# Patient Record
Sex: MALE | Race: WHITE | Employment: PART TIME | URBAN - METROPOLITAN AREA
[De-identification: names, ages, dates, MRNs, and addresses within clinical notes are randomized per-mention and may not be internally consistent; named-entity substitution may affect disease eponyms.]

---

## 2017-02-27 ENCOUNTER — TELEPHONE (OUTPATIENT)
Dept: FAMILY MEDICINE CLINIC | Age: 62
End: 2017-02-27

## 2017-02-28 ENCOUNTER — OFFICE VISIT (OUTPATIENT)
Dept: FAMILY MEDICINE CLINIC | Age: 62
End: 2017-02-28

## 2017-02-28 VITALS
SYSTOLIC BLOOD PRESSURE: 130 MMHG | HEIGHT: 68 IN | BODY MASS INDEX: 29.73 KG/M2 | DIASTOLIC BLOOD PRESSURE: 80 MMHG | WEIGHT: 196.2 LBS | OXYGEN SATURATION: 98 % | HEART RATE: 80 BPM

## 2017-02-28 DIAGNOSIS — Z23 NEED FOR TDAP VACCINATION: ICD-10-CM

## 2017-02-28 DIAGNOSIS — R10.11 ABDOMINAL PAIN, RUQ: Primary | ICD-10-CM

## 2017-02-28 PROCEDURE — 90471 IMMUNIZATION ADMIN: CPT | Performed by: FAMILY MEDICINE

## 2017-02-28 PROCEDURE — 90715 TDAP VACCINE 7 YRS/> IM: CPT | Performed by: FAMILY MEDICINE

## 2017-02-28 PROCEDURE — 99213 OFFICE O/P EST LOW 20 MIN: CPT | Performed by: FAMILY MEDICINE

## 2017-02-28 ASSESSMENT — ENCOUNTER SYMPTOMS
VOMITING: 0
CONSTIPATION: 0
NAUSEA: 0
BELCHING: 0
FLATUS: 0
ABDOMINAL PAIN: 1

## 2021-06-07 ENCOUNTER — OFFICE VISIT (OUTPATIENT)
Dept: ORTHOPEDIC SURGERY | Age: 66
End: 2021-06-07
Payer: COMMERCIAL

## 2021-06-07 VITALS — WEIGHT: 196 LBS | BODY MASS INDEX: 29.7 KG/M2 | HEIGHT: 68 IN

## 2021-06-07 DIAGNOSIS — M25.562 LEFT KNEE PAIN, UNSPECIFIED CHRONICITY: ICD-10-CM

## 2021-06-07 DIAGNOSIS — M17.12 PATELLOFEMORAL ARTHRITIS OF LEFT KNEE: Primary | ICD-10-CM

## 2021-06-07 PROCEDURE — 99204 OFFICE O/P NEW MOD 45 MIN: CPT | Performed by: ORTHOPAEDIC SURGERY

## 2021-06-07 RX ORDER — COVID-19 ANTIGEN TEST
KIT MISCELLANEOUS 2 TIMES DAILY PRN
COMMUNITY
End: 2021-07-19 | Stop reason: ALTCHOICE

## 2021-06-07 RX ORDER — METHYLPREDNISOLONE ACETATE 40 MG/ML
40 INJECTION, SUSPENSION INTRA-ARTICULAR; INTRALESIONAL; INTRAMUSCULAR; SOFT TISSUE ONCE
Status: DISCONTINUED | OUTPATIENT
Start: 2021-06-07 | End: 2021-06-07

## 2021-06-07 NOTE — PROGRESS NOTES
Chief Complaint  Knee Pain (NP LEFT KNEE PAIN)      History of Present Illness:  Radha Goodman is a pleasant 72 y.o. male for left knee pain and swelling for the past 2 months on a background of discomfort for 2 years. He is the  of her one of my patients Leon Ramos who underwent had wrist surgery recently. He is complaining of predominantly fullness in the left knee suprapatellar area with some discomfort in the back of the knee. He denies any inciting injuries. Is worse with prolonged walking. He has subjective crunching popping cracking sound. His knee knee does not lock or give out anymore it used to. He has tried the occasional Alleve with some mild discomfort along with an over-the-counter knee sleeve. He has had some previous minor superficial knee surgery for cuts. But no intra-articular work. 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 Medical History:  Patient's medications, allergies, past medical, surgical, social and family histories were reviewed and updated as appropriate. Pain Assessment  Location of Pain: Knee  Location Modifiers: Left  Severity of Pain: 4  Quality of Pain: Aching, Throbbing, Popping, Cracking, Grinding (CRUNCHES)  Frequency of Pain: Constant  Aggravating Factors: Straightening, Walking  Limiting Behavior: Some  Relieving Factors: Nsaids  Result of Injury: No  Work-Related Injury: No  ROS: Review of systems reviewed from Patient History Form completed today and available in the patient's chart under the Media tab. Pertinent items are noted in HPI  Review of systems reviewed from Patient History Form completed today and available in the patient's chart under the Media tab. Vital Signs:  Ht 5' 8\" (1.727 m)   Wt 196 lb (88.9 kg)   BMI 29.80 kg/m²         Neuro: Alert & oriented x 3,  normal,  no focal deficits noted. Normal affect.   Eyes: sclera clear  Ears: Normal external ear  Mouth:  No perioral lesions  Pulm: Respirations unlabored and regular  Pulse: Extremities well perfused. 2+ peripheral pulses. Skin: Warm. No ulcerations. Constitutional: The physical examination finds the patient to be well-developed and well-nourished. The patient is alert and oriented x3 and was cooperative throughout the visit. LEFT knee exam    Gait: No use of assistive devices. No antalgic gait. Alignment: normal alignment. Inspection/skin: Skin is intact without erythema or ecchymosis. No gross deformity. Palpation: severe crepitus. no joint line tenderness present. Range of Motion: 0 to 110deg flexion, lacks full flexion    Strength: Normal quadriceps development. Effusion: kamila     Ligamentous stability: No cruciate or collateral ligament instability. Neurologic and vascular: Skin is warm and well-perfused. Sensation is intact to light-touch. Special tests: Negative Tala sign. RIGHT knee exam    Gait: No use of assistive devices. No antalgic gait. Alignment: normal alignment. Inspection/skin: Skin is intact without erythema or ecchymosis. No gross deformity. Palpation: mild crepitus. no joint line tenderness present. Range of Motion: There is full range of motion. Strength: Normal quadriceps development. Effusion: No effusion or swelling present. Ligamentous stability: No cruciate or collateral ligament instability. Neurologic and vascular: Skin is warm and well-perfused. Sensation is intact to light-touch. Special tests: Negative Tala sign. Radiology:       Pertinent imaging reviewed, images only - no report available. Radiographs were obtained and reviewed in the office; 4 views: bilateral PA, bilateral Kristel Po, bilateral Merchants AND left and right lateral    Impression: severe medial compartment narrowing special on the [place] view. Moderate patellofemoral narrowing.   No acute findings no loose bodies         Assessment: Patient is a 72 y.o. male left knee pain and swelling related to varus and patellofemoral osteoarthritis. Impression:  Visit Diagnoses       Codes    Left knee pain, unspecified chronicity    -  Primary M25.562          Office Procedures:  No orders of the defined types were placed in this encounter. Orders Placed This Encounter   Procedures    XR KNEE LEFT (3 VIEWS)     Standing Status:   Future     Number of Occurrences:   1     Standing Expiration Date:   6/7/2022    XR KNEE RIGHT (1-2 VIEWS)     Standing Status:   Future     Number of Occurrences:   1     Standing Expiration Date:   6/7/2022       Plan:  Pertinent imaging was reviewed. The etiology, natural history, and treatment options for the disorder were discussed. The roles of activity medication, antiinflammatories, injections, bracing, physical therapy, and surgical interventions were all described to the patient and questions were answered. Given the patient has his upcoming 2nd dose COVID vaccine, the patient is a not yet a candidate for a left knee aspiration and cortisone injection in the office today. However, he is a candidate for oral diclofenac NSAID therapy, and re-evaluation for a need for injection in 3-4 weeks. He would also be a candidate for an off  brace if he is interested. I also did recommend formal physical therapy for range of motion strengthening conditioning. See him back in 1 month for repeat. All the patient's questions were answered while in the clinic. The patient is understanding of all instructions and agrees with the plan. Approximately 45 minutes was spent on patient education and coordinating care. Follow up in: No follow-ups on file.       Sincerely,    Ye Watt  [address] [facility]   [address], [address] [place] [address], [address]  Email: [email or web address]  Office: [phone]    06/07/21  10:07 AM      The encounter with Boy Holliday was carried out by myself, Dr Ernestine Gonzalez, who personally examined the patient and reviewed the plan. This dictation was performed with a verbal recognition program (DRAGON) and it was checked for errors. It is possible that there are still dictated errors within this office note. If so, please bring any errors to my attention for an addendum. All efforts were made to ensure that this office note is accurate.

## 2021-06-07 NOTE — PROGRESS NOTES
6/7/21  10:08 AM        NDC: 06503-300-60   -   Ropivacaine 0.5 %    LOT: [id number]      NDC: 9878-9963-11   -  Lidocaine 1.0%    LOT: [id number]    COMMENT: LEFT KNEE ASPIRATION AND CORTISONE INJECTION

## 2021-06-07 NOTE — LETTER
PRESCRIPTION FOR PHYSICAL THERAPY      Patient Name: Anastasiia Foster MRN: [id number]  DOS: 6/7/2021     Diagnosis:   1. Patellofemoral arthritis of left knee    2. Left knee pain, unspecified chronicity                                                       Goal:  [x]Decrease Pain and/or Swelling [x]Increase ROM and/or Flexibility     [x]Increase Function   [x]Increase Strength and/or Endurance   []Other     Evaluation:  [x]Evaluation and Treatment []KT-1000 []Isokinetic Exam []Preoperative Eval      Recommended Modalities:  [x]Modalities of Choice      []HCVS            []Electrical Stimulation     [] Remove Dressing  []Ultrasound        []TENS/TNS    [] Lumbar Traction           [] Cervical Traction  []Phonophoresis         []Hot Pack/Cold Pack   []PT Treatment, Unlisted []Other:    Therapeutic Exercises:    [x]Isometrics    [x]Range of Motion []Progressive Exer. [x]Balance Coordination   []Flexibility  []ROM Limited  []Total Hip Replacement   []Passive  []ROM Full   []Total Knee Replacement   []Active Assisted    []Shoulder Impingement Prog  []Active   []Tennis Elbow Program   []Capsular Shift Regular        []Isokinetics                    []Spine Program   [x]Straight Leg Raises  [x] Gait    []Fixation    [] Supine    [] Running    [] Extension    [] Prone   [] Throwing   [] Stabilization   [] AB    [] St Lucian Vedia Sniff    [] AD      [] Spine Eval   [] Cervical Eval  [] Conditioning   [] Lumbar    [] Stationary Bike   [] Lumbar Exer.   [] Stairmaster   [] Functional Cap   [] Devol Track   [] Return to work   [] Treadmill  [x]Other :     [] Aquatic Prog.     Knee conditioning and strengthening program.  Patellofemoral protection program.        Treatment Program:  Frequency: [] 1x  [x] 2x  [] 3x  [] 4x  [] 5x week  Duration: [x] 1  [] 2  [] 3  [] 4  [] 5 month   Weight Bearing: [] Non  [] 1/4  [] 1/2  [] 3/4  [] Full  ROM: [] Restricted  [] Full  [] Follow established:        [] Other:

## 2021-06-28 ENCOUNTER — OFFICE VISIT (OUTPATIENT)
Dept: ORTHOPEDIC SURGERY | Age: 66
End: 2021-06-28
Payer: COMMERCIAL

## 2021-06-28 VITALS — BODY MASS INDEX: 30.61 KG/M2 | WEIGHT: 195 LBS | HEIGHT: 67 IN

## 2021-06-28 DIAGNOSIS — M17.12 PATELLOFEMORAL ARTHRITIS OF LEFT KNEE: Primary | ICD-10-CM

## 2021-06-28 PROCEDURE — 20610 DRAIN/INJ JOINT/BURSA W/O US: CPT | Performed by: ORTHOPAEDIC SURGERY

## 2021-06-28 PROCEDURE — 99214 OFFICE O/P EST MOD 30 MIN: CPT | Performed by: ORTHOPAEDIC SURGERY

## 2021-06-28 RX ORDER — METHYLPREDNISOLONE ACETATE 40 MG/ML
40 INJECTION, SUSPENSION INTRA-ARTICULAR; INTRALESIONAL; INTRAMUSCULAR; SOFT TISSUE ONCE
Status: COMPLETED | OUTPATIENT
Start: 2021-06-28 | End: 2021-06-28

## 2021-06-28 RX ORDER — LIDOCAINE HYDROCHLORIDE 10 MG/ML
10 INJECTION, SOLUTION INFILTRATION; PERINEURAL ONCE
Status: COMPLETED | OUTPATIENT
Start: 2021-06-28 | End: 2021-06-28

## 2021-06-28 RX ADMIN — METHYLPREDNISOLONE ACETATE 40 MG: 40 INJECTION, SUSPENSION INTRA-ARTICULAR; INTRALESIONAL; INTRAMUSCULAR; SOFT TISSUE at 15:52

## 2021-06-28 RX ADMIN — LIDOCAINE HYDROCHLORIDE 10 ML: 10 INJECTION, SOLUTION INFILTRATION; PERINEURAL at 15:52

## 2021-07-06 ENCOUNTER — HOSPITAL ENCOUNTER (OUTPATIENT)
Dept: PHYSICAL THERAPY | Age: 66
Setting detail: THERAPIES SERIES
Discharge: HOME | End: 2021-07-06
Payer: COMMERCIAL

## 2021-07-06 PROCEDURE — 97161 PT EVAL LOW COMPLEX 20 MIN: CPT | Performed by: PHYSICAL THERAPIST

## 2021-07-06 PROCEDURE — 97110 THERAPEUTIC EXERCISES: CPT | Performed by: PHYSICAL THERAPIST

## 2021-07-06 PROCEDURE — 97140 MANUAL THERAPY 1/> REGIONS: CPT | Performed by: PHYSICAL THERAPIST

## 2021-07-06 NOTE — FLOWSHEET NOTE
[facility], [facility]    Physical Therapy Treatment Note/ Progress Report:   Date:  2021    Patient Name:  Mick Delong    :  1955  MRN: [id number]  Restrictions/Precautions:    Medical/Treatment Diagnosis Information:  · Diagnosis: M17.12 - Patellofemoral arthritis of left knee  · Treatment Diagnosis: PT treatment diagnosis: L knee pain  Insurance/Certification information:  PT Insurance Information: UMR, BMN, no auth, no copay  Physician Information:  Referring Practitioner: Dr. Shaunna Hinojosa of care signed (Y/N):     Date of Patient follow up with Physician:     Is this a Progress Report:     []  Yes  [x]  No        If Yes:  Date Range for reporting period:  Beginning  Ending    Progress report will be due (10 Rx or 30 days whichever is less): 88       Recertification will be due (POC Duration  / 90 days whichever is less): 21         Visit # Insurance Allowable Auth Required   1 BMN []  Yes [x]  No        Functional Scale: LEFS: 8%    Date assessed:  21      Latex Allergy:  [x]NO      []YES  Preferred Language for Healthcare:   [x]English       []other    Pain level:  2-7/10     SUBJECTIVE:  See eval    Type: []Constant   []Intermitment  []Radiating []Localized  []other:     Functional Limitations: []Sitting []Standing []Walking    []Squatting []Stairs                []ADL's  []Driving []Sports/Recreation  []Other:      OBJECTIVE: see eval    ROM Current (R) Current (L)                       Strength                           Access Code: [email or web address]  URL: [email or web address]  Date: 2021  Prepared by: Nolvia Hong    Exercises  Supine Bridge - 1 x daily - 7 x weekly - 3 sets - 10 reps - 3 seconds hold  Supine Active Straight Leg Raise - 1 x daily - 7 x weekly - 3 sets - 10 reps  Supine ITB Stretch with Strap - 2 x daily - 7 x weekly - 5 reps - 30 seconds hold  Clamshell - 1 x daily - 7 x weekly - 3 sets - 10 reps  Prone Re-education- Provided training and instruction to the patient for proper LE, core and proximal hip recruitment and positioning and eccentric body weight control with ambulation re-education including up and down stairs     Home Exercise Program:    [x] (05171) Reviewed/Progressed HEP activities related to strengthening, flexibility, endurance, ROM of core, proximal hip and LE for functional self-care, mobility, lifting and ambulation/stair navigation   [] (62179)Reviewed/Progressed HEP activities related to improving balance, coordination, kinesthetic sense, posture, motor skill, proprioception of core, proximal hip and LE for self care, mobility, lifting, and ambulation/stair navigation      Manual Treatments:  PROM / STM / Oscillations-Mobs:  G-I, II, III, IV (PA's, Inf., Post.)  [x] (77634) Provided manual therapy to mobilize LE, proximal hip and/or LS spine soft tissue/joints for the purpose of modulating pain, promoting relaxation,  increasing ROM, reducing/eliminating soft tissue swelling/inflammation/restriction, improving soft tissue extensibility and allowing for proper ROM for normal function with self care, mobility, lifting and ambulation. Modalities:      Charges:  Timed Code Treatment Minutes: 30   Total Treatment Minutes: 50     [x] EVAL (LOW) 28802 (typically 20 minutes face-to-face)  [] EVAL (MOD) 46119 (typically 30 minutes face-to-face)  [] EVAL (HIGH) 32650 (typically 45 minutes face-to-face)  [] RE-EVAL     [x] XK(28102) x 1    [] IONTO  [] NMR (42301) x     [] VASO  [x] Manual (81092) x   1   [] Other:  [] TA x      [] Mech Traction (46816)  [] ES(attended) (92306)      [] ES (un) (32072):     GOALS:   herapist goals for Patient:   Short Term Goals: To be achieved in: 2 weeks  1. Independent in HEP and progression per patient tolerance, in order to prevent re-injury. [] Progressing: [] Met: [] Not Met: [] Adjusted   2.  Patient will have a decrease in pain to facilitate improvement in movement, function, and ADLs as indicated by Functional Deficits. [] Progressing: [] Met: [] Not Met: [] Adjusted    Long Term Goals: To be achieved in: 8 weeks  1. Disability index score of 3% or less for the LEFS to assist with reaching prior level of function. [] Progressing: [] Met: [] Not Met: [] Adjusted  2. Patient will demonstrate increased AROM to 140 knee flexion to allow for proper joint functioning as indicated by patients Functional Deficits. [] Progressing: [] Met: [] Not Met: [] Adjusted  3. Patient will demonstrate an increase in Strength to 5/5 knee extension, hip abduction and hip extension to allow for proper functional mobility as indicated by patients Functional Deficits. [] Progressing: [] Met: [] Not Met: [] Adjusted   4. Patient will return to walking 1/2 a mile without increased symptoms or restriction. [] Progressing: [] Met: [] Not Met: [] Adjusted    Progression Towards Functional goals:  [] Patient is progressing as expected towards functional goals listed. [] Progression is slowed due to complexities listed. [] Progression has been slowed due to co-morbidities. [x] Plan just implemented, too soon to assess goals progression  [] Other:     ASSESSMENT:  See eval    Treatment/Activity Tolerance:  [x] Patient tolerated treatment well [] Patient limited by fatique  [] Patient limited by pain  [] Patient limited by other medical complications  [] Other:     Prognosis: [x] Good [] Fair  [] Poor    Patient Requires Follow-up: [x] Yes  [] No    PLAN: See eval  [] Continue per plan of care [] Alter current plan (see comments)  [x] Plan of care initiated [] Hold pending MD visit [] Discharge      Electronically signed by: Jeana Llamas, GANESH Paula PT, OMT-C      Note: If patient does not return for scheduled/ recommended follow up visits, this note will serve as a discharge from care along with most recent update on progress.

## 2021-07-06 NOTE — PLAN OF CARE
a                                                         The [facility] and [address] [facility] [place]   [address], [address], [address] [facility]  Phone: [phone]   Fax:     [phone]                                                       Physical Therapy Certification    Dear Referring Practitioner: Dr. Lesley Pickens,    We had the pleasure of evaluating the following patient for physical therapy services at [address] [place] [address]. A summary of our findings can be found in the initial assessment below. This includes our plan of care. If you have any questions or concerns regarding these findings, please do not hesitate to contact me at the office phone number checked above. Thank you for the referral.       Physician Signature:_______________________________Date:__________________  By signing above (or electronic signature), therapists plan is approved by physician      Patient: Mary Caraballo   : 1955   MRN: [id number]  Referring Physician: Referring Practitioner: Dr. Lesley Pickens      Evaluation Date: 2021      Medical Diagnosis Information:  Diagnosis: M17.12 - Patellofemoral arthritis of left knee   Treatment Diagnosis: PT treatment diagnosis: L knee pain                                         Insurance information: PT Insurance Information: UMR, BMN, no auth, no copay     Precautions/ Contra-indications: NA  Latex Allergy:  [x]NO      []YES  Preferred Language for Healthcare:   [x]English       []other:    C-SSRS Triggered by Intake questionnaire (Past 2 wk assessment):   [x] No, Questionnaire did not trigger screening.   [] Yes, Patient intake triggered further evaluation      [] C-SSRS Screening completed  [] PCP notified via Plan of Care  [] Emergency services notified    SUBJECTIVE: Patient stated complaint: Patient has had complaints of L knee pain over the last 2 years that has gradually worsened within the last 2 months.  He reports no incident that began his current exacerbation, but states that he has difficulty with increased L knee flexion and sensations of hyperextension when ambulating. He reports having x-rays that showed degenerative changes. Patient had a cortisone injection and an aspiration- reports having relief with the aspiration, noting increased knee flexion motion. Patient to follow up with the MD on 7/19/21. Relevant Medical History: NA  Functional Disability Index: LEFS: 8%    .     Pain Scale: 2-7/10  Easing factors: Rest, Aleeve  Provocative factors: Increased walking, initial sit to stand transfer/steps after prolonged sitting    Type: []Constant   [x]Intermittent  []Radiating []Localized []other:     Numbness/Tingling: denies    Functional Limitations/Impairments: []Sitting []Standing [x]Walking    []Squatting/bending  []Stairs           []ADL's  []Transfers []Sports/Recreation []Other:    Occupation/School: [employer]     Living Status/Prior Level of Function: Independent with ADLs and IADLs,      OBJECTIVE:     ROM LEFT RIGHT   HIP Flex     HIP Abd     HIP Ext     HIP IR     HIP ER     Knee ext +3 hyper +3 hyper   Knee Flex 131 137   Ankle PF     Ankle DF 5/5 5/5   Ankle In     Ankle Ev     Strength  LEFT RIGHT   HIP Flexors 4/5 4+/5   HIP Abductors 4/5 4+/5   HIP Ext 4/5 4+/5   Hip ER     Knee EXT (quad) 4+/5 5/5   Knee Flex (HS) 4+/5 5/5   Ankle DF     Ankle PF     Ankle Inv     Ankle EV          Circumference  Mid  7 cm   40.5 40   LE Dermatomes     LE myotomes       Squat: increased trunk flexion, quad dominant  Single Leg Stance: decreased duration on L >R ~3 seconds   Joint mobility: knee, hip   [x]Normal    []Hypo   []Hyper    Palpation: patella, patellar tendon, quad tendon, ITB, gastroc    Functional Mobility/Transfers: pain free crepitus noted with squatting    Posture: pelvis elevation on L,     Bandages/Dressings/Incisions: NA    Gait: (include devices/WB status): increased R lateral trunk flexion, decreased stance on L LE    Orthopedic Special Tests:                        [x] Patient history, allergies, meds reviewed. Medical chart reviewed. See intake form. Review Of Systems (ROS):  [x]Performed Review of systems (Integumentary, CardioPulmonary, Neurological) by intake and observation. Intake form has been scanned into medical record. Patient has been instructed to contact their primary care physician regarding ROS issues if not already being addressed at this time. Co-morbidities/Complexities (which will affect course of rehabilitation):   []None           Arthritic conditions   []Rheumatoid arthritis (M05.9)  []Osteoarthritis (M19.91)   Cardiovascular conditions   []Hypertension (I10)  []Hyperlipidemia (E78.5)  []Angina pectoris (I20)  []Atherosclerosis (I70)   Musculoskeletal conditions   []Disc pathology   []Congenital spine pathologies   []Prior surgical intervention  []Osteoporosis (M81.8)  []Osteopenia (M85.8)   Endocrine conditions   []Hypothyroid (E03.9)  []Hyperthyroid Gastrointestinal conditions   []Constipation (L16.93)   Metabolic conditions   []Morbid obesity (E66.01)  []Diabetes type 1(E10.65) or 2 (E11.65)   []Neuropathy (G60.9)     Pulmonary conditions   []Asthma (J45)  []Coughing   []COPD (J44.9)   Psychological Disorders  []Anxiety (F41.9)  []Depression (F32.9)   []Other:   []Other:          Barriers to/and or personal factors that will affect rehab potential:              [x]Age  []Sex              []Motivation/Lack of Motivation                        []Co-Morbidities              []Cognitive Function, education/learning barriers              []Environmental, home barriers              []profession/work barriers  []past PT/medical experience  []other:  Justification:     PACEMAKER:  - Denied having a pacemaker that would contraindicate the use of electrical modalities.     METAL IMPLANTS:  - Denied metal implants that would contraindicate the use of thermal modalities. Mikael Galindo CANCER HISTORY:  - Denied a history of cancer that would contraindicate thermal modalities. Falls Risk Assessment (30 days):   [x] Falls Risk assessed and no intervention required. [] Falls Risk assessed and Patient requires intervention due to being higher risk   TUG score (>12s at risk):     [] Falls education provided, including           ASSESSMENT:  Functional Impairments:     []Noted lumbar/proximal hip/LE joint hypomobility   [x]Decreased LE functional ROM   [x]Decreased core/proximal hip strength and neuromuscular control   []Decreased LE functional strength   [x]Reduced balance/proprioceptive control   []other:      Functional Activity Limitations (from functional questionnaire and intake)   [x]Reduced ability to tolerate prolonged functional positions   [x]Reduced ability or difficulty with changes of positions or transfers between positions   []Reduced ability to maintain good posture and demonstrate good body mechanics with sitting, bending, and lifting   []Reduced ability to sleep   [] Reduced ability or tolerance with driving and/or computer work   []Reduced ability to perform lifting, carrying tasks   [x]Reduced ability to squat   []Reduced ability to forward bend   [x]Reduced ability to ambulate prolonged functional periods/distances/surfaces   []Reduced ability to ascend/descend stairs   []Reduced ability to run, hop, cut or jump   []other:    Participation Restrictions   []Reduced participation in self care activities   []Reduced participation in home management activities   []Reduced participation in work activities   []Reduced participation in social activities. [x]Reduced participation in sport/recreation activities. Classification :    []Signs/symptoms consistent with post-surgical status including decreased ROM, strength and function.    []Signs/symptoms consistent with joint sprain/strain   []Signs/symptoms consistent with patella-femoral

## 2021-07-08 ENCOUNTER — HOSPITAL ENCOUNTER (OUTPATIENT)
Dept: PHYSICAL THERAPY | Age: 66
Setting detail: THERAPIES SERIES
Discharge: HOME | End: 2021-07-08
Payer: COMMERCIAL

## 2021-07-08 PROCEDURE — 97112 NEUROMUSCULAR REEDUCATION: CPT | Performed by: SPECIALIST/TECHNOLOGIST

## 2021-07-08 PROCEDURE — 97110 THERAPEUTIC EXERCISES: CPT | Performed by: SPECIALIST/TECHNOLOGIST

## 2021-07-08 NOTE — FLOWSHEET NOTE
[facility], [facility]    Physical Therapy Treatment Note/ Progress Report:   Date:  2021    Patient Name:  Erin Trejo    :  1955  MRN: [id number]  Restrictions/Precautions:    Medical/Treatment Diagnosis Information:  · Diagnosis: M17.12 - Patellofemoral arthritis of left knee  · Treatment Diagnosis: PT treatment diagnosis: L knee pain  Insurance/Certification information:  PT Insurance Information: UMR, BMN, no auth, no copay  Physician Information:  Referring Practitioner: Dr. Hayes Avery of care signed (Y/N):     Date of Patient follow up with Physician:     Is this a Progress Report:     []  Yes  [x]  No        If Yes:  Date Range for reporting period:  Beginning  Ending    Progress report will be due (10 Rx or 30 days whichever is less):        Recertification will be due (POC Duration  / 90 days whichever is less): 21         Visit # Insurance Allowable Auth Required   2 BMN []  Yes [x]  No        Functional Scale: LEFS: 8%    Date assessed:  21      Latex Allergy:  [x]NO      []YES  Preferred Language for Healthcare:   [x]English       []other    Pain level:  1/10     SUBJECTIVE:  Pt. Reports he has more knee pressure than pain. Type: []Constant   []Intermitment  []Radiating []Localized  []other:     Functional Limitations: []Sitting []Standing []Walking    []Squatting []Stairs                []ADL's  []Driving []Sports/Recreation  []Other:      OBJECTIVE: see eval    ROM Current (R) Current (L)                       Strength                           Access Code: [email or web address]  URL: [email or web address]  Date: 2021  Prepared by: Louisa Toribio    Exercises  Supine Bridge - 1 x daily - 7 x weekly - 3 sets - 10 reps - 3 seconds hold  Supine Active Straight Leg Raise - 1 x daily - 7 x weekly - 3 sets - 10 reps  Supine ITB Stretch with Strap - 2 x daily - 7 x weekly - 5 reps - 30 seconds hold  Clamshell - 1 x daily - 7 x weekly - 3 sets - 10 reps  Prone Quadriceps Stretch with Strap - 2 x daily - 7 x weekly - 5 reps - 30 seconds hold    Gait: see eval    Joint mobility: see eval   []Normal    []Hypo   []Hyper    Palpation: see eval    Orthopedic Tests: see eval    RESTRICTIONS/PRECAUTIONS: None    Exercises/Interventions:             Script-  Stretching Reps Notes/Last Progression   Bike  5'     Airdyne     Eliptical     Gastroc Stretch - slant  3 x 30\"    Hamstring Stretch: Tableside 3 x 30\"    Piriformis Cross Over     Figure 4 Piriformis     Supine ITB  3x30\"    SKC     DKC     Adductor Stretch     Heel Prop/ Prone Hang     Wallslide     SKTC with SB     BKFO                   Exercises     Add Iso     Quad Sets     SAQ     SLR Flex 30x    SLR ABD 30x    SLR Ext     Clamshells 30x    Prone Donkey Kick 2 x 10    Bridge + ball squeeze 30x    Leg press 100# x 20    HAIDER abd  TKE 45# x 20 B  60# 20 x 3\"    Ankle Theraband     BAPS     HR/TR     Squats     Lateral Walk     Single Leg Balance     EOT Hip Ext/ Donkey Kick                  Manual      Hip Mobs with Belt     Knee Mobs/PROM Grade- 5' Increase flexion, decrease pain   Patellar Mobs     Ankle Mobs/PROM Grade-         Therapeutic Exercise and NMR EXR  [x] (92583) Provided verbal/tactile cueing for activities related to strengthening, flexibility, endurance, ROM for improvements in LE, proximal hip, and core control with self care, mobility, lifting, ambulation. [x] (83011) Provided verbal/tactile cueing for activities related to improving balance, coordination, kinesthetic sense, posture, motor skill, proprioception  to assist with LE, proximal hip, and core control in self care, mobility, lifting, ambulation and eccentric single leg control.      NMR and Therapeutic Activities:    [] (51057 or 10701) Provided verbal/tactile cueing for activities related to improving balance, coordination, kinesthetic sense, posture, motor skill, proprioception and motor activation to allow for proper function of core, proximal hip and LE with self care and ADLs  [] (60357) Gait Re-education- Provided training and instruction to the patient for proper LE, core and proximal hip recruitment and positioning and eccentric body weight control with ambulation re-education including up and down stairs     Home Exercise Program:    [x] (67975) Reviewed/Progressed HEP activities related to strengthening, flexibility, endurance, ROM of core, proximal hip and LE for functional self-care, mobility, lifting and ambulation/stair navigation   [] (51396)Reviewed/Progressed HEP activities related to improving balance, coordination, kinesthetic sense, posture, motor skill, proprioception of core, proximal hip and LE for self care, mobility, lifting, and ambulation/stair navigation      Manual Treatments:  PROM / STM / Oscillations-Mobs:  G-I, II, III, IV (PA's, Inf., Post.)  [] (25185) Provided manual therapy to mobilize LE, proximal hip and/or LS spine soft tissue/joints for the purpose of modulating pain, promoting relaxation,  increasing ROM, reducing/eliminating soft tissue swelling/inflammation/restriction, improving soft tissue extensibility and allowing for proper ROM for normal function with self care, mobility, lifting and ambulation. Modalities:  Pt. decline    Charges:  Timed Code Treatment Minutes: 45   Total Treatment Minutes: 45     [] EVAL (LOW) 14231 (typically 20 minutes face-to-face)  [] EVAL (MOD) 86859 (typically 30 minutes face-to-face)  [] EVAL (HIGH) 70992 (typically 45 minutes face-to-face)  [] RE-EVAL     [x] CD(98667) x 2    [] IONTO  [x] NMR (08869) x 1    [] VASO  [] Manual (96749) x      [] Other:  [] TA x      [] Mech Traction (95769)  [] ES(attended) (53303)      [] ES (un) (87878):     GOALS:   herapist goals for Patient:   Short Term Goals: To be achieved in: 2 weeks  1. Independent in HEP and progression per patient tolerance, in order to prevent re-injury.   [] Progressing: [] Met: [] Not Met: [] Adjusted   2. Patient will have a decrease in pain to facilitate improvement in movement, function, and ADLs as indicated by Functional Deficits. [] Progressing: [] Met: [] Not Met: [] Adjusted    Long Term Goals: To be achieved in: 8 weeks  1. Disability index score of 3% or less for the LEFS to assist with reaching prior level of function. [] Progressing: [] Met: [] Not Met: [] Adjusted  2. Patient will demonstrate increased AROM to 140 knee flexion to allow for proper joint functioning as indicated by patients Functional Deficits. [] Progressing: [] Met: [] Not Met: [] Adjusted  3. Patient will demonstrate an increase in Strength to 5/5 knee extension, hip abduction and hip extension to allow for proper functional mobility as indicated by patients Functional Deficits. [] Progressing: [] Met: [] Not Met: [] Adjusted   4. Patient will return to walking 1/2 a mile without increased symptoms or restriction. [] Progressing: [] Met: [] Not Met: [] Adjusted    Progression Towards Functional goals:  [] Patient is progressing as expected towards functional goals listed. [] Progression is slowed due to complexities listed. [] Progression has been slowed due to co-morbidities. [x] Plan just implemented, too soon to assess goals progression  [] Other:     ASSESSMENT:  Pt. Demonstrates good / pain free knee ROM.       Treatment/Activity Tolerance:  [x] Patient tolerated treatment well [] Patient limited by fatique  [] Patient limited by pain  [] Patient limited by other medical complications  [] Other:     Prognosis: [x] Good [] Fair  [] Poor    Patient Requires Follow-up: [x] Yes  [] No    PLAN: See eval  [] Continue per plan of care [] Alter current plan (see comments)  [x] Plan of care initiated [] Hold pending MD visit [] Discharge      Electronically signed by: Elmo Kraus, PTA  [id number], Josefa Lara, PT, MPT       Note: If patient does not return for scheduled/ recommended follow up visits, this note will serve as a discharge from care along with most recent update on progress.

## 2021-07-13 ENCOUNTER — HOSPITAL ENCOUNTER (OUTPATIENT)
Dept: PHYSICAL THERAPY | Age: 66
Setting detail: THERAPIES SERIES
Discharge: HOME | End: 2021-07-13
Payer: COMMERCIAL

## 2021-07-13 PROCEDURE — 97112 NEUROMUSCULAR REEDUCATION: CPT | Performed by: PHYSICAL THERAPIST

## 2021-07-13 PROCEDURE — 97110 THERAPEUTIC EXERCISES: CPT | Performed by: PHYSICAL THERAPIST

## 2021-07-13 NOTE — FLOWSHEET NOTE
[facility] [place]    [facility] Treatment Note/ Progress Report:   Date:  2021    Patient Name:  Timur East    :  1955  MRN: [id number]  Restrictions/Precautions:    Medical/Treatment Diagnosis Information:  · Diagnosis: M17.12 - Patellofemoral arthritis of left knee  · Treatment Diagnosis: PT treatment diagnosis: L knee pain  Insurance/Certification information:  PT Insurance Information: UMR, BMN, no auth, no copay  Physician Information:  Referring Practitioner: Dr. Latesha Estrada of care signed (Y/N):     Date of Patient follow up with Physician:     Is this a Progress Report:     []  Yes  [x]  No        If Yes:  Date Range for reporting period:  Beginning  Ending    Progress report will be due (10 Rx or 30 days whichever is less): 64       Recertification will be due (POC Duration  / 90 days whichever is less): 21         Visit # Insurance Allowable Auth Required   3 BMN []  Yes [x]  No        Functional Scale: LEFS: 8%    Date assessed:  21      Latex Allergy:  [x]NO      []YES  Preferred Language for Healthcare:   [x]English       []other    Pain level:  1/10     SUBJECTIVE:  Patient reports mild low back pain after his last patient visit. He notes that his L knee \"feels good\" at the start of treatment. Type: []Constant   []Intermitment  []Radiating []Localized  []other:     Functional Limitations: []Sitting []Standing []Walking    []Squatting []Stairs                []ADL's  []Driving []Sports/Recreation  []Other:      OBJECTIVE: see eval    ROM Current (R) Current (L)                       Strength                           Access Code: [email or web address]  URL: [email or web address]  Date: 2021  Prepared by: Stephen Figueroa    Exercises  Supine Bridge - 1 x daily - 7 x weekly - 3 sets - 10 reps - 3 seconds hold  Supine Active Straight Leg Raise - 1 x daily - 7 x weekly - 3 sets - 10 reps  Supine ITB Stretch with Strap - 2 x daily - 7 x weekly - 5 reps - 30 seconds hold  Clamshell - 1 x daily - 7 x weekly - 3 sets - 10 reps  Prone Quadriceps Stretch with Strap - 2 x daily - 7 x weekly - 5 reps - 30 seconds hold    Gait: see eval    Joint mobility: see eval   []Normal    []Hypo   []Hyper    Palpation: see eval    Orthopedic Tests: see eval    RESTRICTIONS/PRECAUTIONS: None    Exercises/Interventions:             Script-  Stretching Reps Notes/Last Progression   Bike  5'     Airdyne     Eliptical     Gastroc Stretch - slant  3 x 30\"    Hamstring Stretch: Tableside 3 x 30\"    Piriformis Cross Over     Figure 4 Piriformis     Supine ITB  3x30\"    SKC     DKC     Adductor Stretch     Heel Prop/ Prone Hang     Wallslide     SKTC with SB     BKFO                   Exercises     Add Iso     Quad Sets     SAQ     SLR Flex 1# 30x    SLR ABD 1# 30x    SLR Ext     Clamshells 30x Green loop   Prone Donkey Kick EOT 2 x 10    Bridge + ball squeeze 30x    Leg press/# x 30/ 60# 20x    HAIDER abd  TKE 45# x 30 B  60# 30 x 3\"    Ankle Theraband     BAPS     HR/TR     Squats     Lateral Walk     Single Leg Balance     EOT Hip Ext/ Donkey Kick                  Manual      Hip Mobs with Belt     Knee Mobs/PROM Grade- 5' Increase flexion, decrease pain   Patellar Mobs     Ankle Mobs/PROM Grade-         Therapeutic Exercise and NMR EXR  [x] (75507) Provided verbal/tactile cueing for activities related to strengthening, flexibility, endurance, ROM for improvements in LE, proximal hip, and core control with self care, mobility, lifting, ambulation. [x] (80988) Provided verbal/tactile cueing for activities related to improving balance, coordination, kinesthetic sense, posture, motor skill, proprioception  to assist with LE, proximal hip, and core control in self care, mobility, lifting, ambulation and eccentric single leg control.      NMR and Therapeutic Activities:    [] (27074 or ) Provided verbal/tactile cueing for activities related to improving balance, coordination, kinesthetic sense, posture, motor skill, proprioception and motor activation to allow for proper function of core, proximal hip and LE with self care and ADLs  [] (76057) Gait Re-education- Provided training and instruction to the patient for proper LE, core and proximal hip recruitment and positioning and eccentric body weight control with ambulation re-education including up and down stairs     Home Exercise Program:    [x] (16361) Reviewed/Progressed HEP activities related to strengthening, flexibility, endurance, ROM of core, proximal hip and LE for functional self-care, mobility, lifting and ambulation/stair navigation   [] (67340)Reviewed/Progressed HEP activities related to improving balance, coordination, kinesthetic sense, posture, motor skill, proprioception of core, proximal hip and LE for self care, mobility, lifting, and ambulation/stair navigation      Manual Treatments:  PROM / STM / Oscillations-Mobs:  G-I, II, III, IV (PA's, Inf., Post.)  [] (27672) Provided manual therapy to mobilize LE, proximal hip and/or LS spine soft tissue/joints for the purpose of modulating pain, promoting relaxation,  increasing ROM, reducing/eliminating soft tissue swelling/inflammation/restriction, improving soft tissue extensibility and allowing for proper ROM for normal function with self care, mobility, lifting and ambulation. Modalities:  Pt. declined    Charges:  Timed Code Treatment Minutes: 45   Total Treatment Minutes: 45     [] EVAL (LOW) 51261 (typically 20 minutes face-to-face)   [] EVAL (MOD) 55642 (typically 30 minutes face-to-face)  [] EVAL (HIGH) 07785 (typically 45 minutes face-to-face)  [] RE-EVAL     [x] VJ(95686) x 2    [] IONTO  [x] NMR (87320) x 1    [] VASO  [] Manual (92793) x      [] Other:  [] TA x      [] Mech Traction (02673)  [] ES(attended) (88094)      [] ES (un) (30985):     GOALS:   herapist goals for Patient:   Short Term Goals: To be achieved in: 2 weeks  1. Independent in HEP and progression per patient tolerance, in order to prevent re-injury. [] Progressing: [] Met: [] Not Met: [] Adjusted   2. Patient will have a decrease in pain to facilitate improvement in movement, function, and ADLs as indicated by Functional Deficits. [] Progressing: [] Met: [] Not Met: [] Adjusted    Long Term Goals: To be achieved in: 8 weeks  1. Disability index score of 3% or less for the LEFS to assist with reaching prior level of function. [] Progressing: [] Met: [] Not Met: [] Adjusted  2. Patient will demonstrate increased AROM to 140 knee flexion to allow for proper joint functioning as indicated by patients Functional Deficits. [] Progressing: [] Met: [] Not Met: [] Adjusted  3. Patient will demonstrate an increase in Strength to 5/5 knee extension, hip abduction and hip extension to allow for proper functional mobility as indicated by patients Functional Deficits. [] Progressing: [] Met: [] Not Met: [] Adjusted   4. Patient will return to walking 1/2 a mile without increased symptoms or restriction. [] Progressing: [] Met: [] Not Met: [] Adjusted    Progression Towards Functional goals:  [] Patient is progressing as expected towards functional goals listed. [] Progression is slowed due to complexities listed. [] Progression has been slowed due to co-morbidities. [x] Plan just implemented, too soon to assess goals progression  [] Other:     ASSESSMENT:  Patient reports no issues with treatment progressions this date.      Treatment/Activity Tolerance:  [x] Patient tolerated treatment well [] Patient limited by fatique  [] Patient limited by pain  [] Patient limited by other medical complications  [] Other:     Prognosis: [x] Good [] Fair  [] Poor    Patient Requires Follow-up: [x] Yes  [] No    PLAN: See eval  [x] Continue per plan of care [] Alter current plan (see comments)  [] Plan of care initiated [] Hold pending MD visit [] Discharge      Electronically signed by: Stacey Mckinney, SPT,  Juan Ivy PT, OMT-C        Note: If patient does not return for scheduled/ recommended follow up visits, this note will serve as a discharge from care along with most recent update on progress.

## 2021-07-19 ENCOUNTER — OFFICE VISIT (OUTPATIENT)
Dept: ORTHOPEDIC SURGERY | Age: 66
End: 2021-07-19
Payer: COMMERCIAL

## 2021-07-19 VITALS — WEIGHT: 195 LBS | BODY MASS INDEX: 30.61 KG/M2 | HEIGHT: 67 IN

## 2021-07-19 DIAGNOSIS — M25.562 LEFT KNEE PAIN, UNSPECIFIED CHRONICITY: ICD-10-CM

## 2021-07-19 DIAGNOSIS — M17.12 PATELLOFEMORAL ARTHRITIS OF LEFT KNEE: Primary | ICD-10-CM

## 2021-07-19 PROCEDURE — 99213 OFFICE O/P EST LOW 20 MIN: CPT | Performed by: ORTHOPAEDIC SURGERY

## 2021-07-19 RX ORDER — DICLOFENAC SODIUM 75 MG/1
75 TABLET, DELAYED RELEASE ORAL 2 TIMES DAILY WITH MEALS
Qty: 60 TABLET | Refills: 1 | Status: ON HOLD | OUTPATIENT
Start: 2021-07-19 | End: 2021-10-15 | Stop reason: HOSPADM

## 2021-07-19 NOTE — PROGRESS NOTES
Chief Complaint  Follow-up (left knee)      History of Present Illness:  Deisi Lizarraga is a pleasant 72 y.o. male here for follow-up of his left knee pain which has been ongoing for over 2 months. 3 weeks ago we aspirated his left knee and gave him a cortisone injection. We aspirated about 60 cc of serosanguineous fluid. He has been doing physical therapy and has undergone 3 sessions. He is feeling a lot better. He still has subjective swelling. But denies any mechanical symptoms and has better range of motion. Medical History:  Patient's medications, allergies, past medical, surgical, social and family histories were reviewed and updated as appropriate. Pain Assessment  Location of Pain: Knee  Location Modifiers: Left  Severity of Pain: 1  Quality of Pain: Aching  Duration of Pain: Persistent  Frequency of Pain: Intermittent  Aggravating Factors: Squatting  Limiting Behavior: No  Relieving Factors: Ice  Result of Injury: No  Work-Related Injury: No  Are there other pain locations you wish to document?: No  ROS: Review of systems reviewed from Patient History Form completed today and available in the patient's chart under the Media tab. Pertinent items are noted in HPI  Review of systems reviewed from Patient History Form completed today and available in the patient's chart under the Media tab. Vital Signs:  Ht 5' 7\" (1.702 m)   Wt 195 lb (88.5 kg)   BMI 30.54 kg/m²         Neuro: Alert & oriented x 3,  normal,  no focal deficits noted. Normal affect. Eyes: sclera clear  Ears: Normal external ear  Mouth:  No perioral lesions  Pulm: Respirations unlabored and regular  Pulse: Extremities well perfused. 2+ peripheral pulses. Skin: Warm. No ulcerations. Constitutional: The physical examination finds the patient to be well-developed and well-nourished. The patient is alert and oriented x3 and was cooperative throughout the visit.       Left knee exam    Gait: No use of assistive devices. No antalgic gait. Alignment: normal alignment. Inspection/skin: Skin is intact without erythema or ecchymosis. No gross deformity. Palpation: mild crepitus. no joint line tenderness present. Range of Motion: 0 to 130  degrees of flexion with much less pain    Strength: Normal quadriceps development. Effusion: Large effusion no redness or warmth    Ligamentous stability: No cruciate instability. MCL pseudolaxity. collateral ligament instability. Neurologic and vascular: Skin is warm and well-perfused. Sensation is intact to light-touch. Special tests: Negative Tala sign. Radiology:       Pertinent imaging reviewed, images only - no report available. Radiographs were obtained and reviewed in the office; 4 views: bilateral PA, bilateral Sophronia Peat, bilateral Merchants AND left and right lateral    Impression: severe medial compartment narrowing special on the [place] view. Moderate patellofemoral narrowing. No acute findings no loose bodies             Assessment: Patient is a 72 y.o. male left knee pain and swelling related to severe varus osteoarthritis and swelling aggravation for the past 2 months. He is improving with therapy and the aspiration/ cortisone injection which I gave him my office on 6/28/2021    Impression:  Visit Diagnoses       Codes    Patellofemoral arthritis of left knee    -  Primary M17.12          Office Procedures:  No orders of the defined types were placed in this encounter. No orders of the defined types were placed in this encounter. Plan:    Perla Hall is improving with respect to his left knee pain. However he still has major swelling of his left knee despite the aspiration and cortisone injection 3 weeks ago. He does have advanced medial and patellofemoral arthritis. My recommendation is resumption of the diclofenac twice a day. To continue therapy and home exercise program.    Follow-up in 1 month.   Should the swelling not improve that point we should consider advanced imaging and/or a rheumatologic work-up. All the patient's questions were answered while in the clinic. The patient is understanding of all instructions and agrees with the plan. Approximately 30 minutes was spent on patient education and coordinating care. Follow up in: No follow-ups on file. Sincerely,    Fallon Zhang MD [address] [facility]   [address], [facility], [address]  Email: [email or web address]  Office: [phone]    07/19/21  9:19 AM      The encounter with Chris Saldana was carried out by myself, Dr Ez Anton, who personally examined the patient and reviewed the plan. This dictation was performed with a verbal recognition program (DRAGON) and it was checked for errors. It is possible that there are still dictated errors within this office note. If so, please bring any errors to my attention for an addendum. All efforts were made to ensure that this office note is accurate.

## 2021-07-21 ENCOUNTER — HOSPITAL ENCOUNTER (OUTPATIENT)
Dept: PHYSICAL THERAPY | Age: 66
Setting detail: THERAPIES SERIES
Discharge: HOME | End: 2021-07-21
Payer: COMMERCIAL

## 2021-07-21 PROCEDURE — 97110 THERAPEUTIC EXERCISES: CPT | Performed by: SPECIALIST/TECHNOLOGIST

## 2021-07-21 PROCEDURE — 97112 NEUROMUSCULAR REEDUCATION: CPT | Performed by: SPECIALIST/TECHNOLOGIST

## 2021-07-21 NOTE — FLOWSHEET NOTE
[facility]- [facility]opa[facility]ics and [facility] Rehabilitation, [place]    Physical Therapy Treatment Note/ Progress Report:   Date:  2021    Patient Name:  Lynne Orozco    :  1955  MRN: [id number]  Restrictions/Precautions:    Medical/Treatment Diagnosis Information:  · Diagnosis: M17.12 - Patellofemoral arthritis of left knee  · Treatment Diagnosis: PT treatment diagnosis: L knee pain  Insurance/Certification information:  PT Insurance Information: UMR, BMN, no auth, no copay  Physician Information:  Referring Practitioner: Dr. Yoel Gtz of care signed (Y/N):     Date of Patient follow up with Physician:     Is this a Progress Report:     []  Yes  [x]  No        If Yes:  Date Range for reporting period:  Beginning  Ending    Progress report will be due (10 Rx or 30 days whichever is less): 23       Recertification will be due (POC Duration  / 90 days whichever is less): 21         Visit # Insurance Allowable Auth Required   4 BMN []  Yes [x]  No        Functional Scale: LEFS: 8%    Date assessed:  21      Latex Allergy:  [x]NO      []YES  Preferred Language for Healthcare:   [x]English       []other    Pain level:  1/10     SUBJECTIVE:  Patient reports the doctor was him to continue PT to strengthen his LLE. Type: []Constant   []Intermitment  []Radiating []Localized  []other:     Functional Limitations: []Sitting []Standing []Walking    []Squatting []Stairs                []ADL's  []Driving []Sports/Recreation  []Other:      OBJECTIVE: see eval    ROM Current (R) Current (L)                       Strength                           Access Code: [email or web address]  URL: [email or web address]  Date: 2021  Prepared by: Yana Nayak    Exercises  Supine Bridge - 1 x daily - 7 x weekly - 3 sets - 10 reps - 3 seconds hold  Supine Active Straight Leg Raise - 1 x daily - 7 x weekly - 3 sets - 10 reps  Supine ITB Stretch with Strap - 2 x daily - 7 x weekly - 5 reps - 30 seconds hold  Clamshell - 1 x daily - 7 x weekly - 3 sets - 10 reps  Prone Quadriceps Stretch with Strap - 2 x daily - 7 x weekly - 5 reps - 30 seconds hold    Gait: see eval    Joint mobility: see eval   []Normal    []Hypo   []Hyper    Palpation: see eval    Orthopedic Tests: see eval    RESTRICTIONS/PRECAUTIONS: None    Exercises/Interventions:             Script-  Stretching Reps Notes/Last Progression   Bike  5'     Airdyne     Eliptical     Gastroc Stretch - slant  3 x 30\"    Hamstring Stretch: Tableside 3 x 30\"    Piriformis Cross Over     Figure 4 Piriformis     Supine ITB  3x30\"    SKC     DKC     Adductor Stretch     Heel Prop/ Prone Hang     Wallslide     SKTC with SB     BKFO                   Exercises     Add Iso     Quad Sets     SAQ     SLR Flex  Supine static 1# 30x  3 x 30\"     SLR ABD 1# 30x    SLR Ext     Clamshells 30x Green loop   Prone Donkey Kick EOT 2 x 10 NV    Bridge + ball squeeze  Bridge + clamshells 30x  Green loop x 15    Leg press/# x 30/ 80# 20x    HAIDER abd  TKE 45# x 30 B  60# 30 x 3\"    Ankle Theraband     BAPS     HR/TR     Squats     Lateral Walk     Single Leg Balance     EOT Hip Ext/ Donkey Kick                  Manual      Hip Mobs with Belt     Knee Mobs/PROM Grade-  Increase flexion, decrease pain   Patellar Mobs     Ankle Mobs/PROM Grade-         Therapeutic Exercise and NMR EXR  [x] (95712) Provided verbal/tactile cueing for activities related to strengthening, flexibility, endurance, ROM for improvements in LE, proximal hip, and core control with self care, mobility, lifting, ambulation. [x] (90835) Provided verbal/tactile cueing for activities related to improving balance, coordination, kinesthetic sense, posture, motor skill, proprioception  to assist with LE, proximal hip, and core control in self care, mobility, lifting, ambulation and eccentric single leg control.      NMR and Therapeutic Activities:    [] (41661 or ) Provided verbal/tactile cueing for activities related to improving balance, coordination, kinesthetic sense, posture, motor skill, proprioception and motor activation to allow for proper function of core, proximal hip and LE with self care and ADLs  [] (30815) Gait Re-education- Provided training and instruction to the patient for proper LE, core and proximal hip recruitment and positioning and eccentric body weight control with ambulation re-education including up and down stairs     Home Exercise Program:    [x] (09112) Reviewed/Progressed HEP activities related to strengthening, flexibility, endurance, ROM of core, proximal hip and LE for functional self-care, mobility, lifting and ambulation/stair navigation   [] (35641)Reviewed/Progressed HEP activities related to improving balance, coordination, kinesthetic sense, posture, motor skill, proprioception of core, proximal hip and LE for self care, mobility, lifting, and ambulation/stair navigation      Manual Treatments:  PROM / STM / Oscillations-Mobs:  G-I, II, III, IV (PA's, Inf., Post.)  [] (73022) Provided manual therapy to mobilize LE, proximal hip and/or LS spine soft tissue/joints for the purpose of modulating pain, promoting relaxation,  increasing ROM, reducing/eliminating soft tissue swelling/inflammation/restriction, improving soft tissue extensibility and allowing for proper ROM for normal function with self care, mobility, lifting and ambulation. Modalities:  Pt. declined    Charges:  Timed Code Treatment Minutes: 45   Total Treatment Minutes: 45     [] EVAL (LOW) 99371 (typically 20 minutes face-to-face)   [] EVAL (MOD) 96516 (typically 30 minutes face-to-face)  [] EVAL (HIGH) 39165 (typically 45 minutes face-to-face)  [] RE-EVAL     [x] PI(84004) x 2    [] IONTO  [x] NMR (28706) x 1    [] VASO  [] Manual (36323) x      [] Other:  [] TA x      [] Mech Traction (86316)  [] ES(attended) (84461)      [] ES (un) (45557):     GOALS:   herapist goals for Patient:   Short Term Goals:  To be achieved in: 2 weeks  1. Independent in HEP and progression per patient tolerance, in order to prevent re-injury. [] Progressing: [] Met: [] Not Met: [] Adjusted   2. Patient will have a decrease in pain to facilitate improvement in movement, function, and ADLs as indicated by Functional Deficits. [] Progressing: [] Met: [] Not Met: [] Adjusted    Long Term Goals: To be achieved in: 8 weeks  1. Disability index score of 3% or less for the LEFS to assist with reaching prior level of function. [] Progressing: [] Met: [] Not Met: [] Adjusted  2. Patient will demonstrate increased AROM to 140 knee flexion to allow for proper joint functioning as indicated by patients Functional Deficits. [] Progressing: [] Met: [] Not Met: [] Adjusted  3. Patient will demonstrate an increase in Strength to 5/5 knee extension, hip abduction and hip extension to allow for proper functional mobility as indicated by patients Functional Deficits. [] Progressing: [] Met: [] Not Met: [] Adjusted   4. Patient will return to walking 1/2 a mile without increased symptoms or restriction. [] Progressing: [] Met: [] Not Met: [] Adjusted    Progression Towards Functional goals:  [] Patient is progressing as expected towards functional goals listed. [] Progression is slowed due to complexities listed. [] Progression has been slowed due to co-morbidities. [x] Plan just implemented, too soon to assess goals progression  [] Other:     ASSESSMENT:  Patient reports no issues with treatment progressions this date.      Treatment/Activity Tolerance:  [x] Patient tolerated treatment well [] Patient limited by fatique  [] Patient limited by pain  [] Patient limited by other medical complications  [] Other:     Prognosis: [x] Good [] Fair  [] Poor    Patient Requires Follow-up: [x] Yes  [] No    PLAN: See eval  [x] Continue per plan of care [] Alter current plan (see comments)  [] Plan of care initiated [] Hold pending MD visit [] Discharge      Electronically signed by: Ciara Garcia, PTA  [id number], Jonathan Abreu, PT, MPT          Note: If patient does not return for scheduled/ recommended follow up visits, this note will serve as a discharge from care along with most recent update on progress.

## 2021-08-12 ENCOUNTER — HOSPITAL ENCOUNTER (OUTPATIENT)
Dept: PHYSICAL THERAPY | Age: 66
Setting detail: THERAPIES SERIES
Discharge: HOME | End: 2021-08-12
Payer: COMMERCIAL

## 2021-08-12 PROCEDURE — 97110 THERAPEUTIC EXERCISES: CPT | Performed by: SPECIALIST/TECHNOLOGIST

## 2021-08-12 PROCEDURE — 97112 NEUROMUSCULAR REEDUCATION: CPT | Performed by: SPECIALIST/TECHNOLOGIST

## 2021-08-12 NOTE — DISCHARGE SUMMARY
The [facility] and [facility]       Physical Therapy Discharge  Date: 2021        Patient Name:  Jeronimo Martinez    :  1955  MRN: [id number]  Referring Physician:Dr. Meño James knee PF OA                        ICD Code:M17.12  [] Surgical [x] Conservative  Therapy Diagnosis/Practice Pattern:left knee pain and weakness      Total number of visits: 5   Reporting Period:   Beginning Date:21   End Date:21    OBJECTIVE  Test used Initial score Discharge Score   Pain Summary  7/10 010   Functional questionnaire LEFS 8% 6%   Functional Testing            ROM Knee flex 137° 140°    ext +3° +3°   Strength Knee flex 4+/5 5/5    ext 4+/5 5/5    Hip flex 4/5 5/5    abd 4/5 5/5            Co-morbidities/Complexities (which will affect course of rehabilitation):   [x]None        Arthritic conditions   []Rheumatoid arthritis (M05.9)  []Osteoarthritis (M19.91) Cardiovascular conditions   []Hypertension (I10)  []Hyperlipidemia (E78.5)  []Angina pectoris (I20)  []Atherosclerosis (I70) Musculoskeletal conditions   []Disc pathology   []Congenital spine pathologies   []Prior surgical intervention  []Osteoporosis (M81.8)  []Osteopenia (M85.8)   Endocrine conditions   []Hypothyroid (E03.9)  []Hyperthyroid Gastrointestinal conditions   []Constipation (T07.36) Metabolic conditions   []Morbid obesity (E66.01)  []Diabetes type 1(E10.65) or 2 (E11.65)   []Neuropathy (G60.9)   Pulmonary conditions   []Asthma (J45)  []Coughing   []COPD (J44.9) Psychological Disorders  []Anxiety (F41.9)  []Depression (F32.9)   []Other: []Other:           Treatment to date:  [x] Therapeutic Exercise    [] Modalities:  [] Therapeutic Activity             []Ultrasound            []Electrical Stimulation  [] Gait Training     []Cervical Traction    [] Lumbar Traction  [x] Neuromuscular Re-education [] Cold/hotpack         []Iontophoresis  [x] Instruction in HEP      Other:  [x] Manual Therapy                   []                                  []   Assessment:  [] All Goals were achieved. [] The following goals were achieved (#'s):  [x] The following goals were not achieved for the following reasons: Score on LEFS  Functional/assessment of improvement as it relates to each goal: ROM, strength, and pain levels with ADL's have improved. Plan of Care:  [x] Discharge from Therapy Services due to:    Reason for Discharge:   [x] Most goals achieved    [] Patient having surgery  [] Physician discontinued therapy  [] Insurance/Financial Limitations [] Patient did not return for follow up visits [] Home program/1 visit only   [] No subjective or objective improvement [] Plateaued   [] Patient was unable to adhere to the plan of care established at evaluation. [] Referred back to physician for re-evaluation and did not return.      [] Other:       Electronically signed by:  Bhupendra Alba PT, EDEN

## 2021-08-12 NOTE — FLOWSHEET NOTE
[facility]    Physical Therapy Treatment Note/ Progress Report:   Date:  2021    Patient Name:  Jesus Lawson    :  1955  MRN: [id number]  Restrictions/Precautions:    Medical/Treatment Diagnosis Information:  · Diagnosis: M17.12 - Patellofemoral arthritis of left knee  · Treatment Diagnosis: PT treatment diagnosis: L knee pain  Insurance/Certification information:  PT Insurance Information: UMR, BMN, no auth, no copay  Physician Information:  Referring Practitioner: Dr. Mary Gutierrez of care signed (Y/N):     Date of Patient follow up with Physician:     Is this a Progress Report:     []  Yes  [x]  No        If Yes:  Date Range for reporting period:  Beginning  Ending    Progress report will be due (10 Rx or 30 days whichever is less):        Recertification will be due (POC Duration  / 90 days whichever is less): 21         Visit # Insurance Allowable Auth Required   5 BMN []  Yes [x]  No        Functional Scale: LEFS: 8%    Date assessed:  21      Latex Allergy:  [x]NO      []YES  Preferred Language for Healthcare:   [x]English       []other    Pain level:  0/10     SUBJECTIVE:  See D/C note     Type: []Constant   []Intermitment  []Radiating []Localized  []other:     Functional Limitations: []Sitting []Standing []Walking    []Squatting []Stairs                []ADL's  []Driving []Sports/Recreation  []Other:      OBJECTIVE: see eval    ROM Current (R) Current (L)                       Strength                           Access Code: [email or web address]  URL: [email or web address]  Date: 2021  Prepared by: Tenna Hollywood    Exercises  Supine Bridge - 1 x daily - 7 x weekly - 3 sets - 10 reps - 3 seconds hold  Supine Active Straight Leg Raise - 1 x daily - 7 x weekly - 3 sets - 10 reps  Supine ITB Stretch with Strap - 2 x daily - 7 x weekly - 5 reps - 30 seconds hold  Clamshell - 1 x daily - 7 x weekly - 3 sets - 10 reps  Prone Quadriceps Stretch with Strap - 2 x daily - 7 x weekly - 5 reps - 30 seconds hold    Gait: see eval    Joint mobility: see eval   []Normal    []Hypo   []Hyper    Palpation: see eval    Orthopedic Tests: see eval    RESTRICTIONS/PRECAUTIONS: None    Exercises/Interventions:             Script-  Stretching Reps Notes/Last Progression   Bike  5'     Airdyne     Eliptical     Gastroc Stretch - slant  3 x 30\"    Hamstring Stretch: Tableside 3 x 30\"    Piriformis Cross Over     Figure 4 Piriformis     Supine ITB  3x30\" Manual    SKC     DKC     Adductor Stretch     Heel Prop/ Prone Hang     Wallslide     SKTC with SB     BKFO                   Exercises     Add Iso     Quad Sets     SAQ     SLR Flex  Supine static 1# 30x  3 x 30\"     SLR ABD 1# 30x    SLR Ext     Clamshells 30x Green loop   Prone Donkey Kick EOT 2 x 10 NV    Bridge + ball squeeze  Bridge + clamshells 30x  Green loop x 15    Leg press/# x 30/ 80# 20x    HAIDER abd  TKE 45# x 30 B  60# 30 x 3\"    Ankle Theraband     BAPS     HR/TR     Squats     Lateral Walk     Single Leg Balance     EOT Hip Ext/ Donkey Kick                  Manual      Hip Mobs with Belt     Knee Mobs/PROM Grade-  Increase flexion, decrease pain   Patellar Mobs     Ankle Mobs/PROM Grade-         Therapeutic Exercise and NMR EXR  [x] (77366) Provided verbal/tactile cueing for activities related to strengthening, flexibility, endurance, ROM for improvements in LE, proximal hip, and core control with self care, mobility, lifting, ambulation. [x] (70966) Provided verbal/tactile cueing for activities related to improving balance, coordination, kinesthetic sense, posture, motor skill, proprioception  to assist with LE, proximal hip, and core control in self care, mobility, lifting, ambulation and eccentric single leg control.      NMR and Therapeutic Activities:    [] (16448 or 86660) Provided verbal/tactile cueing for activities related to improving balance, coordination, kinesthetic sense, patient tolerance, in order to prevent re-injury. [] Progressing: [] Met: [] Not Met: [] Adjusted   2. Patient will have a decrease in pain to facilitate improvement in movement, function, and ADLs as indicated by Functional Deficits. [] Progressing: [] Met: [] Not Met: [] Adjusted    Long Term Goals: To be achieved in: 8 weeks  1. Disability index score of 3% or less for the LEFS to assist with reaching prior level of function. [] Progressing: [] Met: [] Not Met: [] Adjusted  2. Patient will demonstrate increased AROM to 140 knee flexion to allow for proper joint functioning as indicated by patients Functional Deficits. [] Progressing: [] Met: [] Not Met: [] Adjusted  3. Patient will demonstrate an increase in Strength to 5/5 knee extension, hip abduction and hip extension to allow for proper functional mobility as indicated by patients Functional Deficits. [] Progressing: [] Met: [] Not Met: [] Adjusted   4. Patient will return to walking 1/2 a mile without increased symptoms or restriction. [] Progressing: [] Met: [] Not Met: [] Adjusted    Progression Towards Functional goals:  [] Patient is progressing as expected towards functional goals listed. [] Progression is slowed due to complexities listed. [] Progression has been slowed due to co-morbidities. [x] Plan just implemented, too soon to assess goals progression  [] Other:     ASSESSMENT:  Patient reports no issues with treatment progressions this date.      Treatment/Activity Tolerance:  [x] Patient tolerated treatment well [] Patient limited by fatique  [] Patient limited by pain  [] Patient limited by other medical complications  [] Other:     Prognosis: [x] Good [] Fair  [] Poor    Patient Requires Follow-up: [x] Yes  [] No    PLAN: See eval  [] Continue per plan of care [] Alter current plan (see comments)  [] Plan of care initiated [] Hold pending MD visit [x] Discharge      Electronically signed by: Amaya Mendoza, [address], Maddi Aver

## 2021-08-16 ENCOUNTER — OFFICE VISIT (OUTPATIENT)
Dept: ORTHOPEDIC SURGERY | Age: 66
End: 2021-08-16
Payer: COMMERCIAL

## 2021-08-16 VITALS — HEIGHT: 67 IN | RESPIRATION RATE: 12 BRPM | BODY MASS INDEX: 30.45 KG/M2 | WEIGHT: 194 LBS

## 2021-08-16 DIAGNOSIS — M17.12 PATELLOFEMORAL ARTHRITIS OF LEFT KNEE: Primary | ICD-10-CM

## 2021-08-16 DIAGNOSIS — M25.562 LEFT KNEE PAIN, UNSPECIFIED CHRONICITY: ICD-10-CM

## 2021-08-16 PROCEDURE — 99213 OFFICE O/P EST LOW 20 MIN: CPT | Performed by: ORTHOPAEDIC SURGERY

## 2021-08-16 NOTE — PROGRESS NOTES
Chief Complaint  Knee Pain (F/u left knee pain. Continues with HEP and diclofenac 75mg, only using it 1x daily)      History of Present Illness:  Jordon Henning is a pleasant 72 y.o. male here for follow-up of his left knee pain which has been ongoing for over 3 months. He is about 2 months post left knee aspiration and cortisone injection. He has done numerous physical therapy sessions at our [facility] office. His mobility feels much better. He no longer has kneeling pain. The only discomfort is getting up from a seated position but then the knee limbers up quite quickly. He has no mechanical symptoms. Mild subjective swelling. Medical History:  Patient's medications, allergies, past medical, surgical, social and family histories were reviewed and updated as appropriate. Pain Assessment  Location of Pain: Knee  Location Modifiers: Left  Severity of Pain: 1  Quality of Pain: Aching  Duration of Pain: A few hours  Frequency of Pain: Intermittent  Aggravating Factors: Other (Comment) (Nothing)  Limiting Behavior: No  Relieving Factors: Ice  Result of Injury: No  Work-Related Injury: No  Are there other pain locations you wish to document?: No  ROS: Review of systems reviewed from Patient History Form completed today and available in the patient's chart under the Media tab. Pertinent items are noted in HPI  Review of systems reviewed from Patient History Form completed today and available in the patient's chart under the Media tab. Vital Signs:  Resp 12   Ht 5' 7\" (1.702 m)   Wt 194 lb (88 kg)   BMI 30.38 kg/m²         Neuro: Alert & oriented x 3,  normal,  no focal deficits noted. Normal affect. Eyes: sclera clear  Ears: Normal external ear  Mouth:  No perioral lesions  Pulm: Respirations unlabored and regular  Pulse: Extremities well perfused. 2+ peripheral pulses. Skin: Warm. No ulcerations.       Constitutional: The physical examination finds the patient to be well-developed and well-nourished. The patient is alert and oriented x3 and was cooperative throughout the visit. Left knee exam    Gait: No use of assistive devices. No antalgic gait. Alignment: normal alignment. Inspection/skin: Skin is intact without erythema or ecchymosis. No gross deformity. Palpation: mild crepitus. no joint line tenderness present. Range of Motion: 0 to 130  degrees of flexion with much less pain    Strength: Normal quadriceps development. Effusion: Mild to moderate effusion no redness or warmth. This is less than before    Ligamentous stability: No cruciate instability. MCL pseudolaxity. collateral ligament instability. Neurologic and vascular: Skin is warm and well-perfused. Sensation is intact to light-touch. Special tests: Negative Tala sign. Radiology:       Pertinent imaging reviewed, images only - no report available. Radiographs were obtained and reviewed in the office; 4 views: bilateral PA, bilateral Aurelia Kocher, bilateral Merchants AND left and right lateral    Impression: severe medial compartment narrowing special on the Ranger view. Moderate patellofemoral narrowing. No acute findings no loose bodies             Assessment: Patient is a 72 y.o. male with improving left knee pain and swelling related to severe varus osteoarthritis and swelling aggravation for the past 3  months. He is still  improving with therapy and the aspiration/ cortisone injection which I gave him my office on 6/28/2021    Impression:  Visit Diagnoses       Codes    Patellofemoral arthritis of left knee    -  Primary M17.12    Left knee pain, unspecified chronicity     M25.562          Office Procedures:  No orders of the defined types were placed in this encounter. No orders of the defined types were placed in this encounter.       Plan:  My recommendation for Dimas Gonsalves today was to continue with his home exercise program focusing on knee mobility, activity modification, and patellofemoral protection. He can take anti-inflammatory as needed. There is a future option for viscosupplementation should his pain and swelling recur or worsen. I would like to see him back in 2 months for reassessment    All the patient's questions were answered while in the clinic. The patient is understanding of all instructions and agrees with the plan. Approximately 30 minutes was spent on patient education and coordinating care. Follow up in: Return in about 2 months (around 10/16/2021). Sincerely,    Jaguar Bustamante MD [address]   [address] [place], [address]  Email: [email or web address]  Office: [phone]    08/16/21  6:04 PM      The encounter with Mick Delong was carried out by myself, Dr Rajan Fitch, who personally examined the patient and reviewed the plan. This dictation was performed with a verbal recognition program (DRAGON) and it was checked for errors. It is possible that there are still dictated errors within this office note. If so, please bring any errors to my attention for an addendum. All efforts were made to ensure that this office note is accurate.

## 2021-10-01 ENCOUNTER — TELEPHONE (OUTPATIENT)
Dept: FAMILY MEDICINE CLINIC | Age: 66
End: 2021-10-01

## 2021-10-01 NOTE — TELEPHONE ENCOUNTER
Pt asking if he can switch from Dr Cassidy Wright to you since his wife sees you? Also he was exposed to Covid and now has body aches and chills.   He would like to have a COvid test done    Please advise    Last OV:  2-28-17

## 2021-10-01 NOTE — TELEPHONE ENCOUNTER
Pt has appt at [facility] for a Covid test, he will check his schedule and call us back to schedule a NP visit with DR Gurinder Munoz

## 2021-10-07 ENCOUNTER — OFFICE VISIT (OUTPATIENT)
Dept: FAMILY MEDICINE CLINIC | Age: 66
End: 2021-10-07
Payer: COMMERCIAL

## 2021-10-07 VITALS
HEART RATE: 89 BPM | BODY MASS INDEX: 29.76 KG/M2 | DIASTOLIC BLOOD PRESSURE: 88 MMHG | WEIGHT: 190 LBS | SYSTOLIC BLOOD PRESSURE: 136 MMHG | OXYGEN SATURATION: 98 % | TEMPERATURE: 97.9 F

## 2021-10-07 DIAGNOSIS — Z20.822 CLOSE EXPOSURE TO COVID-19 VIRUS: ICD-10-CM

## 2021-10-07 DIAGNOSIS — R52 BODY ACHES: Primary | ICD-10-CM

## 2021-10-07 DIAGNOSIS — R68.83 CHILLS: ICD-10-CM

## 2021-10-07 DIAGNOSIS — M25.50 ARTHRALGIA, UNSPECIFIED JOINT: ICD-10-CM

## 2021-10-07 LAB
BASOPHILS ABSOLUTE: 0 K/UL (ref 0–0.2)
BASOPHILS RELATIVE PERCENT: 0.7 %
EOSINOPHILS ABSOLUTE: 0.1 K/UL (ref 0–0.6)
EOSINOPHILS RELATIVE PERCENT: 1 %
HCT VFR BLD CALC: 43.6 % (ref 40.5–52.5)
HEMOGLOBIN: 14.1 G/DL (ref 13.5–17.5)
LYMPHOCYTES ABSOLUTE: 1.3 K/UL (ref 1–5.1)
LYMPHOCYTES RELATIVE PERCENT: 17.2 %
MCH RBC QN AUTO: 29.3 PG (ref 26–34)
MCHC RBC AUTO-ENTMCNC: 32.4 G/DL (ref 31–36)
MCV RBC AUTO: 90.4 FL (ref 80–100)
MONOCYTES ABSOLUTE: 0.4 K/UL (ref 0–1.3)
MONOCYTES RELATIVE PERCENT: 5.2 %
NEUTROPHILS ABSOLUTE: 5.6 K/UL (ref 1.7–7.7)
NEUTROPHILS RELATIVE PERCENT: 75.9 %
PDW BLD-RTO: 14 % (ref 12.4–15.4)
PLATELET # BLD: 364 K/UL (ref 135–450)
PMV BLD AUTO: 7.7 FL (ref 5–10.5)
RBC # BLD: 4.82 M/UL (ref 4.2–5.9)
WBC # BLD: 7.3 K/UL (ref 4–11)

## 2021-10-07 PROCEDURE — 99204 OFFICE O/P NEW MOD 45 MIN: CPT | Performed by: FAMILY MEDICINE

## 2021-10-07 PROCEDURE — 36415 COLL VENOUS BLD VENIPUNCTURE: CPT | Performed by: FAMILY MEDICINE

## 2021-10-07 RX ORDER — PREDNISONE 20 MG/1
20 TABLET ORAL 2 TIMES DAILY
Qty: 14 TABLET | Refills: 0 | Status: ON HOLD | OUTPATIENT
Start: 2021-10-07 | End: 2021-10-15 | Stop reason: HOSPADM

## 2021-10-07 RX ORDER — IBUPROFEN 200 MG
200 TABLET ORAL EVERY 6 HOURS PRN
Status: ON HOLD | COMMUNITY
End: 2021-10-18 | Stop reason: HOSPADM

## 2021-10-07 ASSESSMENT — ENCOUNTER SYMPTOMS
ABDOMINAL PAIN: 0
VISUAL CHANGE: 0
VOMITING: 0
SORE THROAT: 0
COUGH: 0
NAUSEA: 1
SWOLLEN GLANDS: 0
CHANGE IN BOWEL HABIT: 0

## 2021-10-07 NOTE — PROGRESS NOTES
effusion. Nose: Mucosal edema present. Right Sinus: No maxillary sinus tenderness or frontal sinus tenderness. Left Sinus: No maxillary sinus tenderness or frontal sinus tenderness. Eyes:      General:         Right eye: No discharge. Left eye: No discharge. Extraocular Movements: Extraocular movements intact. Conjunctiva/sclera: Conjunctivae normal.      Pupils: Pupils are equal, round, and reactive to light. Cardiovascular:      Rate and Rhythm: Normal rate and regular rhythm. Heart sounds: Normal heart sounds. Pulmonary:      Effort: Pulmonary effort is normal. No respiratory distress. Breath sounds: Normal breath sounds. No stridor. No wheezing, rhonchi or rales. Chest:      Chest wall: No tenderness. Musculoskeletal:         General: Tenderness present. No swelling. Cervical back: Normal range of motion and neck supple. Right lower leg: No edema. Left lower leg: No edema. Lymphadenopathy:      Cervical: No cervical adenopathy. Skin:     General: Skin is warm. Capillary Refill: Capillary refill takes less than 2 seconds. Coloration: Skin is not pale. Findings: No erythema or rash. Neurological:      General: No focal deficit present. Mental Status: He is alert and oriented to person, place, and time. Mental status is at baseline. Cranial Nerves: No cranial nerve deficit. Motor: No abnormal muscle tone. Coordination: Coordination normal.   Psychiatric:         Mood and Affect: Mood normal.         Behavior: Behavior normal.         Thought Content: Thought content normal.         Assessment:       Diagnosis Orders   1. Body aches  predniSONE (DELTASONE) 20 MG tablet    CBC Auto Differential    CK   2. Close exposure to COVID-19 virus     3. Chills     4.  Arthralgia, unspecified joint  CBC Auto Differential    SEDIMENTATION RATE    RHEUMATOID FACTOR           Plan:       New patient   NOS dx, had covid test done at [facility] on Oct 1 and influenza both resulted as negative,   He had his covid vaccine (pfizet , second dose on June), his symptoms are certainly concerning for viral illness  Continue sx treatment   quarantine   Increase fluids  Prednisone bid 20 mg for severe arthralgia/myalgia  Check blood work     Fu 1 week prn   Go to ED if sx worsen           Michael Moyer MD

## 2021-10-08 LAB
RHEUMATOID FACTOR: <10 IU/ML
SEDIMENTATION RATE, ERYTHROCYTE: 8 MM/HR (ref 0–20)
TOTAL CK: 92 U/L (ref 39–308)

## 2021-10-11 NOTE — PROGRESS NOTES
Chief Complaint  Injections (LEFT KNEE CORTISONE INJECTION. LEFT KNEE PAIN)      History of Present Illness:  Marilu Brenal is a pleasant 72 y.o. male here for a left knee aspiration and cortisone injection for 2 months of knee pain and swelling. He had his Covid vaccine 2 weeks ago and so we were able to safely schedule the injection for today. He has had some improvement with the oral diclofenac as the swelling has gone down however he still feels like his knee stiffness is persisted with some limited flexion as well. He has no mechanical symptoms or locking. Medical History:  Patient's medications, allergies, past medical, surgical, social and family histories were reviewed and updated as appropriate. Pain Assessment  Location of Pain: Knee  Location Modifiers: Left  Severity of Pain: 3  Quality of Pain: Aching (PRESSURE)  Frequency of Pain: Intermittent  Aggravating Factors: Walking  Limiting Behavior: Some  Relieving Factors:  (STRETCHING)  Result of Injury: No  Work-Related Injury: No  Are there other pain locations you wish to document?: No  ROS: Review of systems reviewed from Patient History Form completed today and available in the patient's chart under the Media tab. Pertinent items are noted in HPI  Review of systems reviewed from Patient History Form completed today and available in the patient's chart under the Media tab. Vital Signs:  Ht 5' 7\" (1.702 m)   Wt 195 lb (88.5 kg)   BMI 30.54 kg/m²         Neuro: Alert & oriented x 3,  normal,  no focal deficits noted. Normal affect. Eyes: sclera clear  Ears: Normal external ear  Mouth:  No perioral lesions  Pulm: Respirations unlabored and regular  Pulse: Extremities well perfused. 2+ peripheral pulses. Skin: Warm. No ulcerations. Constitutional: The physical examination finds the patient to be well-developed and well-nourished. The patient is alert and oriented x3 and was cooperative throughout the visit.       Left Screening Questions  1. Are you active on mychart? Y    2. What insurance is in the chart? [facility]    2.  Ordering/Referring Provider: [facility]    3. BMI 25.5    4. Do you have any pulmonary issues? N    5. Have you had a heart, lung, or liver transplant? N    6. Are you currently on dialysis or have chronic kidney disease? N    7. Have you had a stroke or Transient ischemic atttack (TIA) within 6 months? N    8. In the past 6 months, have you had any heart related issues including cardiomyopathy or heart attack? N      If yes, did it require cardiac stenting or other implantable device?N      9. Do you have any implantable devices in your body (pacemaker, defib, LVAD)? N    10. Do you take nitroglycerin? If yes, how often? N    11. Are you currently taking any blood thinners?N    12. Are you a diabetic? N    13. (Females) Are you currently pregnant? N  If yes, how many weeks?      15. Are you taking any prescription pain medications on a routine schedule? N If yes, MAC sedation.    16. Do you have any chemical dependencies such as alcohol, street drugs, or methadone? NIf yes, MAC sedation.    17. Do you have any history of post-traumatic stress syndrome, severe anxiety or history of psychosis? N    18. Do you transfer independently? Yes    19.  Do you have any issues with constipation? N    20. Preferred Pharmacy for Pre Prescription [facility] - [address]    Scheduling Details    Which Colonoscopy Prep was Sent?: Miralax   Procedure Scheduled: Colon  Provider/Surgeon: [facility]  Date of Procedure: 11/10/21  Location:   Caller (Please ask for phone number if not scheduled by patient): Marcelina      Sedation Type: MAC  Conscious Sedation- Needs  for 6 hours after the procedure  MAC/General-Needs  for 24 hours after procedure    Pre-op Required at [facility], [place], [place] and OR for MAC sedation:   (if yes advise patient they will need a pre-op prior to procedure)       Is patient on blood thinners? -N (If yes- inform patient to follow up with PCP or provider for follow up instructions)     Informed patient they will need an adult  Yes  Cannot take any type of public or medical transportation alone    Pre-Procedure Covid test to be completed at [facility] or Externally: Externally-Fax number given for [facility]    Confirmed Nurse will call to complete assessment Yes    Additional comments:    consented to the procedure. Under sterile conditions and after informed consent was obtained the patient was given an injection into the LEFT knee. 2cc 40 mg of Depo-Medrol and 4 mL of 0.5% ropivacaine (Naropin) were placed in the knee after it was prepped with chlorhexidine, and after approximately 60 cc of normal colored serous joint fluid was aspirated. This resulted in good relief of symptoms. There were no complications. The patient was advised to ice the knee this evening and to avoid vigorous activities for the next 2 days. They were advised to call us if there was any erythema, enduration, swelling or increasing pain. Follow-up in 1 month. All the patient's questions were answered while in the clinic. The patient is understanding of all instructions and agrees with the plan. Approximately 45 minutes was spent on patient education and coordinating care. Follow up in: No follow-ups on file. Sincerely,    Jorge Eddy MD [address] [place] [address] Dr Brannon, [address] [place] [address], [facility]  Email: [email or web address]  Office: [phone]    06/28/21  3:49 PM      The encounter with Vish Ruano was carried out by myself, Dr Kathy Galindo, who personally examined the patient and reviewed the plan. This dictation was performed with a verbal recognition program (DRAGON) and it was checked for errors. It is possible that there are still dictated errors within this office note. If so, please bring any errors to my attention for an addendum. All efforts were made to ensure that this office note is accurate.

## 2021-10-12 ENCOUNTER — APPOINTMENT (OUTPATIENT)
Dept: GENERAL RADIOLOGY | Age: 66
DRG: 453 | End: 2021-10-12
Payer: COMMERCIAL

## 2021-10-12 ENCOUNTER — APPOINTMENT (OUTPATIENT)
Dept: CT IMAGING | Age: 66
DRG: 453 | End: 2021-10-12
Payer: COMMERCIAL

## 2021-10-12 ENCOUNTER — HOSPITAL ENCOUNTER (INPATIENT)
Age: 66
LOS: 6 days | Discharge: HOME HEALTH | DRG: 453 | End: 2021-10-18
Attending: EMERGENCY MEDICINE | Admitting: INTERNAL MEDICINE
Payer: COMMERCIAL

## 2021-10-12 ENCOUNTER — TELEPHONE (OUTPATIENT)
Dept: FAMILY MEDICINE CLINIC | Age: 66
End: 2021-10-12

## 2021-10-12 ENCOUNTER — NURSE TRIAGE (OUTPATIENT)
Dept: OTHER | Facility: CLINIC | Age: 66
End: 2021-10-12

## 2021-10-12 DIAGNOSIS — M62.81 MUSCLE WEAKNESS OF EXTREMITY: Primary | ICD-10-CM

## 2021-10-12 DIAGNOSIS — Z98.1 S/P CERVICAL SPINAL FUSION: ICD-10-CM

## 2021-10-12 DIAGNOSIS — R26.9 ABNORMAL GAIT: ICD-10-CM

## 2021-10-12 PROBLEM — R53.1 GENERALIZED WEAKNESS: Status: ACTIVE | Noted: 2021-10-12

## 2021-10-12 LAB
ALBUMIN SERPL-MCNC: 4.4 G/DL (ref 3.4–5)
ALP BLD-CCNC: 81 U/L (ref 40–129)
ALT SERPL-CCNC: 123 U/L (ref 10–40)
ANION GAP SERPL CALCULATED.3IONS-SCNC: 13 MMOL/L (ref 3–16)
AST SERPL-CCNC: 41 U/L (ref 15–37)
BASE EXCESS VENOUS: 3.3 MMOL/L (ref -2–3)
BASOPHILS ABSOLUTE: 0 K/UL (ref 0–0.2)
BASOPHILS RELATIVE PERCENT: 0.2 %
BILIRUB SERPL-MCNC: 0.5 MG/DL (ref 0–1)
BILIRUBIN DIRECT: <0.2 MG/DL (ref 0–0.3)
BILIRUBIN, INDIRECT: ABNORMAL MG/DL (ref 0–1)
BUN BLDV-MCNC: 23 MG/DL (ref 7–20)
C-REACTIVE PROTEIN: <3 MG/L (ref 0–5.1)
CALCIUM SERPL-MCNC: 9 MG/DL (ref 8.3–10.6)
CARBOXYHEMOGLOBIN: 1.2 % (ref 0–1.5)
CHLORIDE BLD-SCNC: 104 MMOL/L (ref 99–110)
CO2: 24 MMOL/L (ref 21–32)
CREAT SERPL-MCNC: 1 MG/DL (ref 0.8–1.3)
EKG ATRIAL RATE: 77 BPM
EKG DIAGNOSIS: NORMAL
EKG P AXIS: 57 DEGREES
EKG P-R INTERVAL: 164 MS
EKG Q-T INTERVAL: 362 MS
EKG QRS DURATION: 96 MS
EKG QTC CALCULATION (BAZETT): 409 MS
EKG R AXIS: 55 DEGREES
EKG T AXIS: 70 DEGREES
EKG VENTRICULAR RATE: 77 BPM
EOSINOPHILS ABSOLUTE: 0 K/UL (ref 0–0.6)
EOSINOPHILS RELATIVE PERCENT: 0.1 %
GFR AFRICAN AMERICAN: >60
GFR NON-AFRICAN AMERICAN: >60
GLUCOSE BLD-MCNC: 108 MG/DL (ref 70–99)
GLUCOSE BLD-MCNC: 112 MG/DL (ref 70–99)
HCO3 VENOUS: 28 MMOL/L (ref 24–28)
HCT VFR BLD CALC: 42.4 % (ref 40.5–52.5)
HEMOGLOBIN, VEN, REDUCED: 20 %
HEMOGLOBIN: 14 G/DL (ref 13.5–17.5)
LACTIC ACID: 1.2 MMOL/L (ref 0.4–2)
LIPASE: 186 U/L (ref 13–60)
LYMPHOCYTES ABSOLUTE: 1.3 K/UL (ref 1–5.1)
LYMPHOCYTES RELATIVE PERCENT: 9.8 %
MCH RBC QN AUTO: 29.7 PG (ref 26–34)
MCHC RBC AUTO-ENTMCNC: 33.1 G/DL (ref 31–36)
MCV RBC AUTO: 89.9 FL (ref 80–100)
METHEMOGLOBIN VENOUS: 0.6 % (ref 0–1.5)
MONOCYTES ABSOLUTE: 0.4 K/UL (ref 0–1.3)
MONOCYTES RELATIVE PERCENT: 3.5 %
NEUTROPHILS ABSOLUTE: 11 K/UL (ref 1.7–7.7)
NEUTROPHILS RELATIVE PERCENT: 86.4 %
O2 SAT, VEN: 80 %
PCO2, VEN: 41.8 MMHG (ref 41–51)
PDW BLD-RTO: 14 % (ref 12.4–15.4)
PERFORMED ON: ABNORMAL
PH VENOUS: 7.43 (ref 7.35–7.45)
PLATELET # BLD: 411 K/UL (ref 135–450)
PMV BLD AUTO: 7.5 FL (ref 5–10.5)
PO2, VEN: 41.5 MMHG (ref 25–40)
POTASSIUM REFLEX MAGNESIUM: 3.9 MMOL/L (ref 3.5–5.1)
PRO-BNP: 92 PG/ML (ref 0–124)
RBC # BLD: 4.72 M/UL (ref 4.2–5.9)
SEDIMENTATION RATE, ERYTHROCYTE: 10 MM/HR (ref 0–20)
SODIUM BLD-SCNC: 141 MMOL/L (ref 136–145)
TCO2 CALC VENOUS: 29 MMOL/L
TOTAL CK: 47 U/L (ref 39–308)
TOTAL PROTEIN: 7.3 G/DL (ref 6.4–8.2)
TROPONIN: <0.01 NG/ML
WBC # BLD: 12.7 K/UL (ref 4–11)

## 2021-10-12 PROCEDURE — 70450 CT HEAD/BRAIN W/O DYE: CPT

## 2021-10-12 PROCEDURE — 80048 BASIC METABOLIC PNL TOTAL CA: CPT

## 2021-10-12 PROCEDURE — 84484 ASSAY OF TROPONIN QUANT: CPT

## 2021-10-12 PROCEDURE — 83605 ASSAY OF LACTIC ACID: CPT

## 2021-10-12 PROCEDURE — 80076 HEPATIC FUNCTION PANEL: CPT

## 2021-10-12 PROCEDURE — U0003 INFECTIOUS AGENT DETECTION BY NUCLEIC ACID (DNA OR RNA); SEVERE ACUTE RESPIRATORY SYNDROME CORONAVIRUS 2 (SARS-COV-2) (CORONAVIRUS DISEASE [COVID-19]), AMPLIFIED PROBE TECHNIQUE, MAKING USE OF HIGH THROUGHPUT TECHNOLOGIES AS DESCRIBED BY CMS-2020-01-R: HCPCS

## 2021-10-12 PROCEDURE — 99283 EMERGENCY DEPT VISIT LOW MDM: CPT

## 2021-10-12 PROCEDURE — 85025 COMPLETE CBC W/AUTO DIFF WBC: CPT

## 2021-10-12 PROCEDURE — 82607 VITAMIN B-12: CPT

## 2021-10-12 PROCEDURE — 84443 ASSAY THYROID STIM HORMONE: CPT

## 2021-10-12 PROCEDURE — 83880 ASSAY OF NATRIURETIC PEPTIDE: CPT

## 2021-10-12 PROCEDURE — 93005 ELECTROCARDIOGRAM TRACING: CPT | Performed by: PHYSICIAN ASSISTANT

## 2021-10-12 PROCEDURE — 82550 ASSAY OF CK (CPK): CPT

## 2021-10-12 PROCEDURE — 83690 ASSAY OF LIPASE: CPT

## 2021-10-12 PROCEDURE — U0005 INFEC AGEN DETEC AMPLI PROBE: HCPCS

## 2021-10-12 PROCEDURE — 86140 C-REACTIVE PROTEIN: CPT

## 2021-10-12 PROCEDURE — 82803 BLOOD GASES ANY COMBINATION: CPT

## 2021-10-12 PROCEDURE — 1200000000 HC SEMI PRIVATE

## 2021-10-12 PROCEDURE — 36415 COLL VENOUS BLD VENIPUNCTURE: CPT

## 2021-10-12 PROCEDURE — 71045 X-RAY EXAM CHEST 1 VIEW: CPT

## 2021-10-12 PROCEDURE — 82306 VITAMIN D 25 HYDROXY: CPT

## 2021-10-12 PROCEDURE — 85652 RBC SED RATE AUTOMATED: CPT

## 2021-10-12 RX ORDER — SENNA AND DOCUSATE SODIUM 50; 8.6 MG/1; MG/1
1 TABLET, FILM COATED ORAL 2 TIMES DAILY
Status: DISCONTINUED | OUTPATIENT
Start: 2021-10-12 | End: 2021-10-18 | Stop reason: HOSPADM

## 2021-10-12 RX ORDER — FAMOTIDINE 20 MG/1
20 TABLET, FILM COATED ORAL 2 TIMES DAILY
Status: DISCONTINUED | OUTPATIENT
Start: 2021-10-12 | End: 2021-10-17

## 2021-10-12 RX ORDER — ACETAMINOPHEN 325 MG/1
650 TABLET ORAL EVERY 6 HOURS PRN
Status: DISCONTINUED | OUTPATIENT
Start: 2021-10-12 | End: 2021-10-18 | Stop reason: HOSPADM

## 2021-10-12 RX ORDER — SODIUM CHLORIDE 0.9 % (FLUSH) 0.9 %
10 SYRINGE (ML) INJECTION EVERY 12 HOURS SCHEDULED
Status: DISCONTINUED | OUTPATIENT
Start: 2021-10-12 | End: 2021-10-18 | Stop reason: HOSPADM

## 2021-10-12 RX ORDER — ONDANSETRON 2 MG/ML
4 INJECTION INTRAMUSCULAR; INTRAVENOUS EVERY 6 HOURS PRN
Status: DISCONTINUED | OUTPATIENT
Start: 2021-10-12 | End: 2021-10-18 | Stop reason: HOSPADM

## 2021-10-12 RX ORDER — ONDANSETRON 4 MG/1
4 TABLET, ORALLY DISINTEGRATING ORAL EVERY 8 HOURS PRN
Status: DISCONTINUED | OUTPATIENT
Start: 2021-10-12 | End: 2021-10-18 | Stop reason: HOSPADM

## 2021-10-12 RX ORDER — ACETAMINOPHEN 650 MG/1
650 SUPPOSITORY RECTAL EVERY 6 HOURS PRN
Status: DISCONTINUED | OUTPATIENT
Start: 2021-10-12 | End: 2021-10-18 | Stop reason: HOSPADM

## 2021-10-12 RX ORDER — SODIUM CHLORIDE 9 MG/ML
25 INJECTION, SOLUTION INTRAVENOUS PRN
Status: DISCONTINUED | OUTPATIENT
Start: 2021-10-12 | End: 2021-10-18 | Stop reason: HOSPADM

## 2021-10-12 RX ORDER — SODIUM CHLORIDE 0.9 % (FLUSH) 0.9 %
10 SYRINGE (ML) INJECTION PRN
Status: DISCONTINUED | OUTPATIENT
Start: 2021-10-12 | End: 2021-10-18 | Stop reason: HOSPADM

## 2021-10-12 ASSESSMENT — ENCOUNTER SYMPTOMS
VOMITING: 0
SHORTNESS OF BREATH: 0
COUGH: 0
DIARRHEA: 0
ABDOMINAL PAIN: 0
CHEST TIGHTNESS: 0
NAUSEA: 0
BACK PAIN: 0

## 2021-10-12 ASSESSMENT — PAIN SCALES - GENERAL: PAINLEVEL_OUTOF10: 0

## 2021-10-12 NOTE — H&P
Hospital Medicine History & Physical      PCP: Saul Crowley MD    Date of Admission: 10/12/2021    Date of Service: 10/12/2021    Pt seen/examined on 10/12/2021    Admitted to Inpatient with expected LOS greater than two midnights due to medical therapy. Chief Complaint:     Chief Complaint   Patient presents with    Numbness     BUE AND BLE WEAKNESS/NUMBNESS FOR X1 WEEK AND OFF BALANCE       History Of Present Illness:      72 y.o. male who presented to [facility] with some numbness/achiness in all 4 extremities, slightly worse in LUE that began 2 weeks ago and has been intermittent since that time. It is associated with some unsteady gait that is abnormal for him. He also initially had some myalgias for which he was tested for COVID twice, both of which were negative. He was started on prednisone outpatient and his systemic symptoms improved but the numbness/achiness persisted. He has a remote h/o neck surgery, but has not had any complications. Admitted with request for neuro consult. Past Medical History:      Reviewed and non-contributory except as noted below      Diagnosis Date    BPH (benign prostatic hyperplasia)        Past Surgical History:      Reviewed and non-contributory except as noted below      Procedure Laterality Date    CERVICAL FUSION  2006    CHOLECYSTECTOMY, LAPAROSCOPIC  2009    LEG SURGERY  2000    benign tumor; partial gastronemeus resection       Medications Prior to Admission:      Reviewed and non-contributory except as noted below  Prior to Admission medications    Medication Sig Start Date End Date Taking?  Authorizing Provider   ibuprofen (ADVIL;MOTRIN) 200 MG tablet Take 200 mg by mouth every 6 hours as needed for Pain    Historical Provider, MD   predniSONE (DELTASONE) 20 MG tablet Take 1 tablet by mouth 2 times daily for 7 days 10/7/21 10/14/21  Saul Crowley MD   diclofenac (VOLTAREN) 75 MG EC tablet Take 1 tablet by mouth 2 times daily (with meals) 7/19/21 10/7/21  Teresita Dyer MD       Allergies:     Reviewed and non-contributory except as noted below   Patient has no known allergies. Social History:      Reviewed and non-contributory except as noted below    TOBACCO:   reports that he has never smoked. He has never used smokeless tobacco.  ETOH:   reports no history of alcohol use. Family History:      Reviewed and non-contributory except as noted below    History reviewed. No pertinent family history. REVIEW OF SYSTEMS:   Pertinent positives and negatives as noted in the HPI. All other systems reviewed and negative. PHYSICAL EXAM PERFORMED:    BP (!) 162/97   Pulse 82   Temp 98.8 °F (37.1 °C) (Oral)   Resp 26   Ht 5' 7\" (1.702 m)   SpO2 96%   BMI 29.76 kg/m²     General appearance:  No acute distress, appears stated age  Eyes: Pupils equal, round, reactive to light, conjunctiva/corneas clear  Ears/Nose/Mouth/Throat: No external lesions or scars, hearing intact to voice  Neck: Trachea midline, no masses noted, no thyromegaly  Respiratory:  Non-labored breathing, clear to auscultation bilaterally  Cardiovascular: Regular rate and rhythm, no murmurs, gallops, or rubs  Abdomen: soft, non-tender, non-distended  Musculoskeletal: Warm, well perfused, no cyanosis or edema  Skin: normal color, no wounds noted  Psychiatric: A&Ox4, good insight and judgment    Labs:     Recent Labs     10/12/21  1554   WBC 12.7*   HGB 14.0   HCT 42.4        Recent Labs     10/12/21  1554      K 3.9      CO2 24   BUN 23*   CREATININE 1.0   CALCIUM 9.0     Recent Labs     10/12/21  1554   AST 41*   *   BILIDIR <0.2   BILITOT 0.5   ALKPHOS 81     No results for input(s): INR in the last 72 hours.   Recent Labs     10/12/21  1554   CKTOTAL 47   TROPONINI <0.01       Urinalysis:    No results found for: Carrion Bartholomew, BACTERIA, RBCUA, BLOODU, SPECGRAV, GLUCOSEU    Radiology:     XR CHEST 1 VIEW   Final Result   Impression: No acute

## 2021-10-12 NOTE — ED PROVIDER NOTES
ED Attending Attestation Note     Date of evaluation: 10/12/2021    This patient was seen by the advance practice provider. I have seen and examined the patient, agree with the workup, evaluation, management and diagnosis. The care plan has been discussed. I have reviewed the ECG and concur with the IRMA's interpretation. My assessment reveals patient who is awake and alert with normal mental status. Does not have any appreciable weakness on exam although reports difficulty with walking and may have very mild finger-to-nose dysmetria with his left hand.      Tristan Lombardo MD  10/12/21 2907

## 2021-10-12 NOTE — ED PROVIDER NOTES
[address] ENCOUNTER          PHYSICIAN ASSISTANT NOTE       Date of evaluation: 10/12/2021    Chief Complaint     Numbness (BUE AND BLE WEAKNESS/NUMBNESS FOR X1 WEEK AND OFF BALANCE)      History of Present Illness     Donnie Brown is a 72 y.o. male who presents to the ED with weakness and intermittent numbness to bilateral upper and lower extremities. Patient states that on 9/27/2021 he was around someone who is Covid positive.  2 days later on 9/29/2021 he developed achiness to bilateral upper extremities with the sensation of chills. Over the next several days he also developed achiness to bilateral lower extremities. He states during this time he felt that all of his extremities were weak. He also has had intermittent numbness that comes and goes throughout bilateral upper and lower extremities. He states when the numbness comes on, it seems to be the entire extremity and does not specifically began proximally or distally. He states he is also had difficulty ambulating over this time due to the weakness in his extremities and has had multiple falls due to this. He was tested for COVID-19 on 10/1/2021 and 10/2/2021 which both came back negative. He is also tested negative for influenza. He did follow-up with a primary care provider on 10/7/2021 and was started on prednisone. He states that his fatigue and chills have improved since starting the prednisone, however he still feels a weakness to his extremities. Patient denies headache, dizziness, chest pain, cough, shortness of breath, abdominal pain, nausea, vomiting, or any other symptoms. He is vaccinated against COVID-19. Review of Systems     Review of Systems   Constitutional: Negative for activity change, chills and fever. HENT: Negative for congestion. Respiratory: Negative for cough, chest tightness and shortness of breath. Cardiovascular: Negative for chest pain.    Gastrointestinal: Negative for abdominal pain, diarrhea, nausea and vomiting. Genitourinary: Negative for difficulty urinating. Musculoskeletal: Negative for back pain. Soreness to bilateral upper and lower extremities   Skin: Negative for rash. Neurological: Positive for weakness and numbness. Negative for dizziness and headaches. Psychiatric/Behavioral: Negative for agitation. Past Medical, Surgical, Family, and Social History     He has a past medical history of BPH (benign prostatic hyperplasia). He has a past surgical history that includes Cholecystectomy, laparoscopic (2009); cervical fusion (2006); and Leg Surgery (2000). His family history is not on file. He reports that he has never smoked. He has never used smokeless tobacco. He reports that he does not drink alcohol and does not use drugs. Medications     Previous Medications    DICLOFENAC (VOLTAREN) 75 MG EC TABLET    Take 1 tablet by mouth 2 times daily (with meals)    IBUPROFEN (ADVIL;MOTRIN) 200 MG TABLET    Take 200 mg by mouth every 6 hours as needed for Pain    PREDNISONE (DELTASONE) 20 MG TABLET    Take 1 tablet by mouth 2 times daily for 7 days       Allergies     He has No Known Allergies. Physical Exam     INITIAL VITALS: BP: (!) 175/119, Temp: 98.8 °F (37.1 °C), Pulse: 86, Resp: 18, SpO2: 99 %  Physical Exam  Constitutional:       Appearance: Normal appearance. HENT:      Head: Normocephalic and atraumatic. Eyes:      Pupils: Pupils are equal, round, and reactive to light. Cardiovascular:      Rate and Rhythm: Normal rate and regular rhythm. Pulmonary:      Effort: Pulmonary effort is normal.      Breath sounds: Normal breath sounds. Abdominal:      General: There is no distension. Palpations: Abdomen is soft. Tenderness: There is no abdominal tenderness. Musculoskeletal:         General: Normal range of motion. Cervical back: Normal range of motion.       Comments: No midline tenderness no cervical, thoracic, or lumbar spine. Full range of motion of neck without difficulty. Full range of motion of all joints of bilateral upper and lower extremities without pain. Skin:     General: Skin is warm and dry. Neurological:      General: No focal deficit present. Mental Status: He is alert and oriented to person, place, and time. Comments: Cranial nerves II through XII intact. Normal finger-to-nose. Normal heel shin. No pronator drift. Patient does have 5/5 strength to bilateral upper and lower extremities. When ambulating, he has a grossly abnormal gait and feels that his legs are going to give out. Sensation intact grossly throughout bilateral lower extremities. Psychiatric:         Mood and Affect: Mood normal.         Behavior: Behavior normal.         Diagnostic Results     EKG   Interpreted in conjunction with emergency department physician Jaylon Che*  Rhythm: normal sinus   Rate: normal  Axis: normal  : none  Conduction: normal  ST Segments: normal  T Waves: inversion in  aVr  Q Waves:nonspecific  Clinical Impression: non-specific EKG  Comparison:  none    RADIOLOGY:  XR CHEST 1 VIEW   Final Result   Impression: No acute abnormality. CT Head WO Contrast   Final Result      No acute intracranial hemorrhage or signs of mass effect.           LABS:   Results for orders placed or performed during the hospital encounter of 10/12/21   CBC Auto Differential   Result Value Ref Range    WBC 12.7 (H) 4.0 - 11.0 K/uL    RBC 4.72 4.20 - 5.90 M/uL    Hemoglobin 14.0 13.5 - 17.5 g/dL    Hematocrit 42.4 40.5 - 52.5 %    MCV 89.9 80.0 - 100.0 fL    MCH 29.7 26.0 - 34.0 pg    MCHC 33.1 31.0 - 36.0 g/dL    RDW 14.0 12.4 - 15.4 %    Platelets 217 090 - 394 K/uL    MPV 7.5 5.0 - 10.5 fL    Neutrophils % 86.4 %    Lymphocytes % 9.8 %    Monocytes % 3.5 %    Eosinophils % 0.1 %    Basophils % 0.2 %    Neutrophils Absolute 11.0 (H) 1.7 - 7.7 K/uL    Lymphocytes Absolute 1.3 1.0 - 5.1 K/uL    Monocytes Absolute 0.4 0.0 - 1.3 K/uL    Eosinophils Absolute 0.0 0.0 - 0.6 K/uL    Basophils Absolute 0.0 0.0 - 0.2 K/uL   Basic Metabolic Panel w/ Reflex to MG   Result Value Ref Range    Sodium 141 136 - 145 mmol/L    Potassium reflex Magnesium 3.9 3.5 - 5.1 mmol/L    Chloride 104 99 - 110 mmol/L    CO2 24 21 - 32 mmol/L    Anion Gap 13 3 - 16    Glucose 112 (H) 70 - 99 mg/dL    BUN 23 (H) 7 - 20 mg/dL    CREATININE 1.0 0.8 - 1.3 mg/dL    GFR Non-African American >60 >60    GFR African American >60 >60    Calcium 9.0 8.3 - 10.6 mg/dL   Hepatic Function Panel   Result Value Ref Range    Total Protein 7.3 6.4 - 8.2 g/dL    Albumin 4.4 3.4 - 5.0 g/dL    Alkaline Phosphatase 81 40 - 129 U/L     (H) 10 - 40 U/L    AST 41 (H) 15 - 37 U/L    Total Bilirubin 0.5 0.0 - 1.0 mg/dL    Bilirubin, Direct <0.2 0.0 - 0.3 mg/dL    Bilirubin, Indirect see below 0.0 - 1.0 mg/dL   Lipase   Result Value Ref Range    Lipase 186.0 (H) 13.0 - 60.0 U/L   Troponin   Result Value Ref Range    Troponin <0.01 <0.01 ng/mL   Brain Natriuretic Peptide   Result Value Ref Range    Pro-BNP 92 0 - 124 pg/mL   Lactic Acid, Plasma   Result Value Ref Range    Lactic Acid 1.2 0.4 - 2.0 mmol/L   Blood Gas, Venous   Result Value Ref Range    pH, Luiz 7.434 7.350 - 7.450    pCO2, Luiz 41.8 41.0 - 51.0 mmHg    pO2, Luiz 41.5 (H) 25 - 40 mmHg    HCO3, Venous 28.0 24.0 - 28.0 mmol/L    Base Excess, Luiz 3.3 (H) -2.0 - 3.0 mmol/L    O2 Sat, Luiz 80 Not established %    Carboxyhemoglobin 1.2 0.0 - 1.5 %    MetHgb, Luiz 0.6 0.0 - 1.5 %    TC02 (Calc), Luiz 29 mmol/L    Hemoglobin, Luiz, Reduced 20.00 %   CK (Lab)   Result Value Ref Range    Total CK 47 39 - 308 U/L   Sedimentation Rate   Result Value Ref Range    Sed Rate 10 0 - 20 mm/Hr   CRP   Result Value Ref Range    CRP <3.0 0.0 - 5.1 mg/L   POCT Glucose   Result Value Ref Range    POC Glucose 108 (H) 70 - 99 mg/dl    Performed on ACCU-CHEK    EKG 12 Lead   Result Value Ref Range    Ventricular Rate 77 BPM    Atrial Rate 77 BPM    P-R Interval 164 ms    QRS Duration 96 ms    Q-T Interval 362 ms    QTc Calculation (Bazett) 409 ms    P Axis 57 degrees    R Axis 55 degrees    T Axis 70 degrees    Diagnosis       EKG performed in ER and to be interpreted by ER physician. Confirmed by ALLY SNYDER ([id number]),  Marek Decker [phone]) on 10/12/2021 3:46:50 PM     RECENT VITALS:  BP: (!) 162/97, Temp: 98.8 °F (37.1 °C), Pulse: 82, Resp: 26, SpO2: 96 %     ED Course     Nursing Notes, Past Medical Hx,Past Surgical Hx, Social Hx, Allergies, and Family Hx were reviewed. The patient was given the following medications:  No orders of the defined types were placed in this encounter. CONSULTS:  IP CONSULT TO HOSPITALIST    MEDICAL DECISION MAKING / ASSESSMENT / Leora Kandace is a 72 y.o. male who presents the emergency department due to concern for weakness. Vital signs stable on presentation remained stable throughout his stay. Thorough history and physical exam performed in detail above. Patient presents the emergency room due to concern for weakness to bilateral upper and lower extremities as detailed in HPI. On physical exam cranial nerves II through XII intact. Normal finger-to-nose. No pronator drift. Normal heel-to-shin. 5/5 strength throughout bilateral upper and lower extremities. I did ambulate the patient and his gait was grossly abnormal.  He was stumbling and I had concerned he was going to fall multiple times while walking only 5 feet. Patient states this is atypical for him. EKG without ischemic changes or arrhythmia. CBC with a mild elevation white blood cell count of 12.7, however he has recently been on steroids. BMP unremarkable. Hepatic function panel with a mildly elevated ALT to 123 and AST to 41. Lipase elevated to 186. Troponin negative. BNP within normal limits. VBG unremarkable. Lactate within normal limits. CK within normal at 47. Sed rate 10. CRP less than 3.   Chest x-ray

## 2021-10-12 NOTE — TELEPHONE ENCOUNTER
----- Message from Radha Ybarra sent at 10/12/2021 11:35 AM EDT -----  Subject: Message to Provider    QUESTIONS  Information for Provider? Pt needs to be seen today, per [facility]. Has   numbness in arms and legs. Tested neg for Covid and Flu. Attempted to call   office 3x, just rang. Please call pt to schedule for TODAY  ---------------------------------------------------------------------------  --------------  CALL BACK INFO  What is the best way for the office to contact you? OK to leave message on   voicemail  Preferred Call Back Phone Number? [phone]  ---------------------------------------------------------------------------  --------------  SCRIPT ANSWERS  Relationship to Patient?  Self

## 2021-10-12 NOTE — TELEPHONE ENCOUNTER
Received call from [facility] at [facility] with Red Flag Complaint. Brief description of triage: Numbness in both arms and legs. Was seen by PCP    Triage indicates for patient to see today in PCP office. Care advice provided, patient verbalizes understanding; denies any other questions or concerns; instructed to call back for any new or worsening symptoms. Writer provided warm transfer to Jhoan Jimenez at [facility] for appointment scheduling. Attention Provider: Thank you for allowing me to participate in the care of your patient. The patient was connected to triage in response to information provided to the ECC/PSC. Please do not respond through this encounter as the response is not directed to a shared pool. Reason for Disposition   Patient wants to be seen    Answer Assessment - Initial Assessment Questions  1. SYMPTOM: \"What is the main symptom you are concerned about? \" (e.g., weakness, numbness)      Numbness and weakness in both arms and legs    2. ONSET: \"When did this start? \" (minutes, hours, days; while sleeping)      Since 10/5/21    3. LAST NORMAL: \"When was the last time you were normal (no symptoms)? \"      Before 10/5/21    4. PATTERN \"Does this come and go, or has it been constant since it started? \"  \"Is it present now? \"      Constant    5. CARDIAC SYMPTOMS: \"Have you had any of the following symptoms: chest pain, difficulty breathing, palpitations? \"      No    6. NEUROLOGIC SYMPTOMS: \"Have you had any of the following symptoms: headache, dizziness, vision loss, double vision, changes in speech, unsteady on your feet? \"      Unsteady on feet    7. OTHER SYMPTOMS: \"Do you have any other symptoms? \"      No    8. PREGNANCY: \"Is there any chance you are pregnant? \" \"When was your last menstrual period? \"      N/a    Protocols used: NEUROLOGIC DEFICIT-ADULT-[place]

## 2021-10-12 NOTE — ED NOTES
Pt presents to the ed from home ambulatory with c/o BUE and BLE numbness and weakness for x1 week. Pt also reports that he has been \"off balance\" which has caused him to fall x2 over the past week. Pt has been placed on oral steroids and his numbness/weakness has not improved. Pt is a/o x4, answering questions appropriately.       Laura Moreira RN  10/12/21 1235

## 2021-10-13 ENCOUNTER — APPOINTMENT (OUTPATIENT)
Dept: MRI IMAGING | Age: 66
DRG: 453 | End: 2021-10-13
Payer: COMMERCIAL

## 2021-10-13 LAB
ANION GAP SERPL CALCULATED.3IONS-SCNC: 11 MMOL/L (ref 3–16)
BILIRUBIN URINE: NEGATIVE
BLOOD, URINE: NEGATIVE
BUN BLDV-MCNC: 24 MG/DL (ref 7–20)
CALCIUM SERPL-MCNC: 8.9 MG/DL (ref 8.3–10.6)
CHLORIDE BLD-SCNC: 103 MMOL/L (ref 99–110)
CLARITY: CLEAR
CO2: 29 MMOL/L (ref 21–32)
COLOR: YELLOW
CREAT SERPL-MCNC: 1.2 MG/DL (ref 0.8–1.3)
EPITHELIAL CELLS, UA: ABNORMAL /HPF (ref 0–5)
GFR AFRICAN AMERICAN: >60
GFR NON-AFRICAN AMERICAN: >60
GLUCOSE BLD-MCNC: 104 MG/DL (ref 70–99)
GLUCOSE URINE: NEGATIVE MG/DL
HCT VFR BLD CALC: 43.4 % (ref 40.5–52.5)
HEMOGLOBIN: 14.4 G/DL (ref 13.5–17.5)
INR BLD: 0.96 (ref 0.88–1.12)
KETONES, URINE: NEGATIVE MG/DL
LEUKOCYTE ESTERASE, URINE: NEGATIVE
LIPASE: 372 U/L (ref 13–60)
MCH RBC QN AUTO: 30.1 PG (ref 26–34)
MCHC RBC AUTO-ENTMCNC: 33.1 G/DL (ref 31–36)
MCV RBC AUTO: 90.9 FL (ref 80–100)
MICROSCOPIC EXAMINATION: ABNORMAL
MUCUS: ABNORMAL /LPF
NITRITE, URINE: NEGATIVE
PDW BLD-RTO: 13.7 % (ref 12.4–15.4)
PH UA: 6 (ref 5–8)
PLATELET # BLD: 382 K/UL (ref 135–450)
PMV BLD AUTO: 7.9 FL (ref 5–10.5)
POTASSIUM REFLEX MAGNESIUM: 4.3 MMOL/L (ref 3.5–5.1)
PROTEIN UA: NEGATIVE MG/DL
PROTHROMBIN TIME: 10.8 SEC (ref 9.9–12.7)
RBC # BLD: 4.77 M/UL (ref 4.2–5.9)
RBC UA: ABNORMAL /HPF (ref 0–4)
SARS-COV-2: NOT DETECTED
SODIUM BLD-SCNC: 143 MMOL/L (ref 136–145)
SPECIFIC GRAVITY UA: 1.01 (ref 1–1.03)
TSH REFLEX: 1.25 UIU/ML (ref 0.27–4.2)
URINE TYPE: ABNORMAL
UROBILINOGEN, URINE: 0.2 E.U./DL
VITAMIN B-12: 353 PG/ML (ref 211–911)
VITAMIN D 25-HYDROXY: 44.4 NG/ML
WBC # BLD: 11.3 K/UL (ref 4–11)
WBC UA: ABNORMAL /HPF (ref 0–5)

## 2021-10-13 PROCEDURE — 87086 URINE CULTURE/COLONY COUNT: CPT

## 2021-10-13 PROCEDURE — 80048 BASIC METABOLIC PNL TOTAL CA: CPT

## 2021-10-13 PROCEDURE — 36415 COLL VENOUS BLD VENIPUNCTURE: CPT

## 2021-10-13 PROCEDURE — 6360000002 HC RX W HCPCS: Performed by: INTERNAL MEDICINE

## 2021-10-13 PROCEDURE — 85027 COMPLETE CBC AUTOMATED: CPT

## 2021-10-13 PROCEDURE — 97165 OT EVAL LOW COMPLEX 30 MIN: CPT

## 2021-10-13 PROCEDURE — 97116 GAIT TRAINING THERAPY: CPT

## 2021-10-13 PROCEDURE — 81001 URINALYSIS AUTO W/SCOPE: CPT

## 2021-10-13 PROCEDURE — 72141 MRI NECK SPINE W/O DYE: CPT

## 2021-10-13 PROCEDURE — 6370000000 HC RX 637 (ALT 250 FOR IP): Performed by: INTERNAL MEDICINE

## 2021-10-13 PROCEDURE — 1200000000 HC SEMI PRIVATE

## 2021-10-13 PROCEDURE — 97530 THERAPEUTIC ACTIVITIES: CPT

## 2021-10-13 PROCEDURE — 83690 ASSAY OF LIPASE: CPT

## 2021-10-13 PROCEDURE — 97162 PT EVAL MOD COMPLEX 30 MIN: CPT

## 2021-10-13 PROCEDURE — 85610 PROTHROMBIN TIME: CPT

## 2021-10-13 PROCEDURE — 2580000003 HC RX 258: Performed by: INTERNAL MEDICINE

## 2021-10-13 PROCEDURE — 97535 SELF CARE MNGMENT TRAINING: CPT

## 2021-10-13 PROCEDURE — 99222 1ST HOSP IP/OBS MODERATE 55: CPT | Performed by: NURSE PRACTITIONER

## 2021-10-13 RX ORDER — LANOLIN ALCOHOL/MO/W.PET/CERES
500 CREAM (GRAM) TOPICAL DAILY
Status: DISCONTINUED | OUTPATIENT
Start: 2021-10-13 | End: 2021-10-18 | Stop reason: HOSPADM

## 2021-10-13 RX ADMIN — ENOXAPARIN SODIUM 40 MG: 40 INJECTION SUBCUTANEOUS at 01:56

## 2021-10-13 RX ADMIN — Medication 10 ML: at 09:12

## 2021-10-13 RX ADMIN — Medication 10 ML: at 20:54

## 2021-10-13 RX ADMIN — CYANOCOBALAMIN TAB 1000 MCG 500 MCG: 1000 TAB at 20:53

## 2021-10-13 RX ADMIN — Medication 10 ML: at 00:17

## 2021-10-13 RX ADMIN — DOCUSATE SODIUM 50 MG AND SENNOSIDES 8.6 MG 1 TABLET: 8.6; 5 TABLET, FILM COATED ORAL at 20:53

## 2021-10-13 ASSESSMENT — PAIN SCALES - GENERAL
PAINLEVEL_OUTOF10: 0

## 2021-10-13 NOTE — PROGRESS NOTES
4 Eyes Admission Assessment     I agree as the admission nurse that 2 RN's have performed a thorough Head to Toe Skin Assessment on the patient. ALL assessment sites listed below have been assessed on admission. Areas assessed by both nurses:   [x]   Head, Face, and Ears   [x]   Shoulders, Back, and Chest  [x]   Arms, Elbows, and Hands   [x]   Coccyx, Sacrum, and Ischium  [x]   Legs, Feet, and Heels        Does the Patient have Skin Breakdown?   No         Adrian Prevention initiated:  No   Wound Care Orders initiated:  No      [facility] nurse consulted for Pressure Injury (Stage 3,4, Unstageable, DTI, NWPT, and Complex wounds) or Adrian score 18 or lower:  No      Nurse 1 eSignature: Electronically signed by Paul Blanchard RN on 10/13/21 at 12:19 AM EDT    **SHARE this note so that the co-signing nurse is able to place an eSignature**    Nurse 2 eSignature: Electronically signed by Padmini Espino RN on 10/13/21 at 6:09 AM EDT

## 2021-10-13 NOTE — PROGRESS NOTES
Hospitalist Progress Note      PCP: Shelly Oliveros MD    Date of Admission: 10/12/2021    Chief Complaint on Admission: weakness, numbness    Pt Seen/Examined and Chart Reviewed. Admitting dx as above    SUBJECTIVE/OBJECTIVE:   Patient continues to have subjective numbness that mostly is present below his face all the way down to his feet, this is associated with unstable balance, and weakness. It is stable from prior, no worsening. His basic work-up is so far unremarkable. He was seen by neurology and MRI cervical spine was ordered, he is awaiting the test.  Denies any chest pain, shortness of breath, abdominal pain, tolerating p.o. well. Allergies  Patient has no known allergies. Medications      Scheduled Meds:   sodium chloride flush  10 mL IntraVENous 2 times per day    [Held by provider] enoxaparin  40 mg SubCUTAneous Daily    sennosides-docusate sodium  1 tablet Oral BID    famotidine  20 mg Oral BID       Infusions:   sodium chloride         PRN Meds:  sodium chloride flush, sodium chloride, ondansetron **OR** ondansetron, magnesium hydroxide, acetaminophen **OR** acetaminophen    Vitals    TEMPERATURE:  Current - Temp: 98.2 °F (36.8 °C); Max - Temp  Av °F (36.7 °C)  Min: 97.2 °F (36.2 °C)  Max: 98.5 °F (36.9 °C)  RESPIRATIONS RANGE: Resp  Av  Min: 18  Max: 27  PULSE RANGE: Pulse  Av.1  Min: 67  Max: 88  BLOOD PRESSURE RANGE:  Systolic (16KIK), JRI:721 , Min:128 , GLV:969   ; Diastolic (42YCD), IVM:87, Min:84, Max:93    PULSE OXIMETRY RANGE: SpO2  Av.8 %  Min: 95 %  Max: 99 %  24HR INTAKE/OUTPUT:      Intake/Output Summary (Last 24 hours) at 10/13/2021 1713  Last data filed at 10/13/2021 0345  Gross per 24 hour   Intake --   Output 600 ml   Net -600 ml       Exam:      General appearance: No apparent distress, appears stated age and cooperative. Lungs: Clear to ascultation, bilaterally without Rales/Wheezes/Rhonchi with good respiratory effort.   Heart: Regular rate and rhythm with Normal S1/S2 without  murmurs, rubs or gallops, point of maximum impulse non-displaced  Abdomen: Soft, non-tender or non-distended without rigidity or guarding and positive bowel sounds all four quadrants. Extremities: No clubbing, cyanosis, or edema bilaterally. Skin: Skin color, texture, turgor normal.  No rashes or lesions. Neurologic: Alert and oriented X 3, subjective numbness from neck down, slight weakness, of balance  Mental status: Alert, oriented, thought content appropriate. Data    Recent Labs     10/12/21  1554 10/13/21  0620   WBC 12.7* 11.3*   HGB 14.0 14.4   HCT 42.4 43.4    382      Recent Labs     10/12/21  1554 10/13/21  0620    143   K 3.9 4.3    103   CO2 24 29   BUN 23* 24*   CREATININE 1.0 1.2     Recent Labs     10/12/21  1554   AST 41*   *   BILIDIR <0.2   BILITOT 0.5   ALKPHOS 81     No results for input(s): INR in the last 72 hours. Recent Labs     10/12/21  1554   CKTOTAL 47   TROPONINI <0.01       Consults:     IP CONSULT TO HOSPITALIST  IP CONSULT TO NEUROLOGY  IP CONSULT TO CASE MANAGEMENT  IP CONSULT TO NEUROSURGERY    Active Hospital Problems    Diagnosis Date Noted    Generalized weakness [R53.1] 10/12/2021         ASSESSMENT AND PLAN      Generalized numbness, bilateral upper and lower extremity weakness, unsteady gait:  In a patient with previous cervical surgery  Neurology has been consulted, advised to get MRI of cervical spine  Vitamin B12 and vitamin D levels, TSH are pending  Continue with PT OT efforts at this time  COVID-19 is ruled out    Systemic leukocytosis:  Without fever, likely due to recent exposure to steroids, monitor for signs and symptoms of infection    BPH:  Urinating well, continue to monitor for urinary retention        DVT Prophylaxis: Inpatient  Diet: ADULT DIET;  Regular  Diet NPO  Code Status: Full Code    PT/OT Eval Status: For home care    Antwan Loss -inpatient    Brionna Sanders MD

## 2021-10-13 NOTE — PROGRESS NOTES
Pt admitted to 6324 from ED. VSS, on room air. Pt is alert and oriented, c/o numbness to hands and legs. Neuro checks documented. Fall precautions in place, pt instructed to call before getting OOB. Pt instructed to call for assistance.

## 2021-10-13 NOTE — CONSULTS
NEUROSURGERY [facility]  [id number]   1955   10/13/2021    Requesting physician: May Irwin MD    Reason for consultation: cervical stenosis with myelopathy     History of present illness: Patient is a 72 y.o. male w/ PMH of BPH, gall bladder removal, fatty tumor removal from leg and previous C4-7 ACDF by Dr. Tuyet Davis in 2004 who presented on 10/13/2021 with BUE and BLE weakness/sensory change and gait disturbance. Patient reports he had close exposure to someone with Covid-19 on 9/29/21, 2 days later he developed chills and generalized body aching. Aching was worst in his shoulders and bilateral forearms. He assumed he had contracted the virus but tested negative for Covid-19 twice, he then got tested for influenza which was also negative. When his symptoms persisted he saw his PCP who prescribed him prednisone to help with his body aches. He reports the \"chills\" feeling subsided and his pain became less severe but he just did not feel quite right. He began noticing feeing increased weakness in all of his extremities (worst in left hand), and decreased sensation in all extremities on Saturday Oct 9. He also reports a fall at home while in his yard because his left leg buckled under him. He denies any worsening neck pain, he denies radicular pain in shoulders or upper extremities. However, he reports a burning sensation in the back of his neck with rotating his head up and down, this is new. He denies pain radiating down legs. He has generalized decreased sensation in both upper extremities reporting that he can tell he is being touched but when scratching his skin it feels \"dull\". He had a similar decrease in sensation throughout his left calf but that has resolved. He is having difficulty washing his hair and performing other ADLs with left hand that he normally could.  His wife at bedside reports that his ambulation is severely impacted as well, he has been walking like he is drunk for the past 4-5 days. He denies bowel/bladder compromise. A head CT did not reveal any acute intracranial abnormalities. MRI cervical spine demonstrates multilevel spondylosis, worse at C3-C4 where there is severe bilateral neural foraminal stenosis. Neurosurgery was consulted for recommendations. ROS:   GENERAL:  Denies fever or recent illness. Denies weight changes   EYES:  Denies vision change or diplopia  EARS:  Denies hearing loss  CARDIAC:  Denies chest pain  RESPIRATORY:  Denies shortness of breath  SKIN:  Denies rash or lesions   HEM:  Denies excessive bruising  PSYCH:  Denies anxiety or depression  NEURO:  + extremity weakness, gait disturbance,  myelopathic   :  Denies urinary difficulty  GI: Denies nausea, vomiting, diarrhea or constipation  MUSCULOSKELETAL:  + generalized body aching     No Known Allergies    Past Medical History:   Diagnosis Date    BPH (benign prostatic hyperplasia)         Past Surgical History:   Procedure Laterality Date    CERVICAL FUSION  2006    CHOLECYSTECTOMY, LAPAROSCOPIC  2009    LEG SURGERY  2000    benign tumor; partial gastronemeus resection       Social History     Occupational History    Occupation:    Tobacco Use    Smoking status: Never Smoker    Smokeless tobacco: Never Used   Substance and Sexual Activity    Alcohol use: No    Drug use: No    Sexual activity: Yes     Partners: Female        History reviewed. No pertinent family history.      Outpatient Medications Marked as Taking for the 10/12/21 encounter [facility] Encounter)   Medication Sig Dispense Refill    ibuprofen (ADVIL;MOTRIN) 200 MG tablet Take 200 mg by mouth every 6 hours as needed for Pain      predniSONE (DELTASONE) 20 MG tablet Take 1 tablet by mouth 2 times daily for 7 days 14 tablet 0      Current Facility-Administered Medications   Medication Dose Route Frequency Provider Last Rate Last Admin    sodium chloride flush 0.9 % injection 10 mL  10 mL IntraVENous 2 times per day Sherly Tran MD   10 mL at 10/13/21 0912    sodium chloride flush 0.9 % injection 10 mL  10 mL IntraVENous PRN Sherly Tran MD        0.9 % sodium chloride infusion  25 mL IntraVENous PRN Sherly Tran MD        enoxaparin (LOVENOX) injection 40 mg  40 mg SubCUTAneous Daily Sherly Tran MD   40 mg at 10/13/21 0156    ondansetron (ZOFRAN-ODT) disintegrating tablet 4 mg  4 mg Oral Q8H PRN Sherly Tran MD        Or    ondansetron [facility]) injection 4 mg  4 mg IntraVENous Q6H PRN Sherly Tran MD        sennosides-docusate sodium (SENOKOT-S) 8.6-50 MG tablet 1 tablet  1 tablet Oral BID Sherly Tran MD        magnesium hydroxide (MILK OF MAGNESIA) 400 MG/5ML suspension 30 mL  30 mL Oral Daily PRN Sherly Tran MD        famotidine (PEPCID) tablet 20 mg  20 mg Oral BID Sherly Tran MD        acetaminophen (TYLENOL) tablet 650 mg  650 mg Oral Q6H PRN Sherly Tran MD        Or   Raimundo Gaitan acetaminophen (TYLENOL) suppository 650 mg  650 mg Rectal Q6H PRN Sherly Tran MD          Objective:  /84   Pulse 74   Temp 98.5 °F (36.9 °C) (Oral)   Resp 18   Ht 5' 7\" (1.702 m)   Wt 182 lb 6.4 oz (82.7 kg)   SpO2 98%   BMI 28.57 kg/m²     Physical Exam:  Patient seen and examined   General: Well developed. Alert and cooperative in no acute distress. HENT: atraumatic, neck supple, previous ACDF scar present   Eyes: Optic discs: Not tested  Pulmonary: unlabored respiratory effort  Cardiovascular:  Warm well perfused.  No peripheral edema  Gastrointestinal: abdomen soft, NT, ND    Neurologic Exam:  Neurological:  Mental Status: Awake, alert, oriented x 4   Sensation: Intact to all extremities to light touch    DTRs: + White's and Clonus on left side, negative on right     Musculoskeletal:   Gait: Not tested 2/2 safety however patient and wife at bedside both report significant gait disturbance, walks like he is \"drunk\"  Assist devices: None   Tone: normal   Motor strength:    Right  Left    Right Left    Deltoid  5 4+  Hip Flex  5 4   Biceps  5 4+  Knee Extensors  5 4   Triceps  5 4+  Knee Flexors  5 4   Wrist Ext  5 4+  Ankle Dorsiflex. 5 5   Wrist Flex  5 4+  Ankle Plantarflex. 5 5   Handgrip  5 4-  Ext Edmar Longus  5 5   Thumb Ext  5 4-         Patient also demonstrates decreased proprioception and dexterity with LUE/LLE when working with PT/OT. Radiological Findings:  MRI CERVICAL SPINE WO CONTRAST   Final Result   1. Postsurgical changes related to ACDF as detailed above. 2. Multilevel spondylosis is present, worse at C3-C4 where there is severe bilateral neural foraminal stenosis. XR CHEST 1 VIEW   Final Result   Impression: No acute abnormality. CT Head WO Contrast   Final Result      No acute intracranial hemorrhage or signs of mass effect. Labs  Recent Labs     10/12/21  1554 10/13/21  0620    143    103   CO2 24 29   BUN 23* 24*   CREATININE 1.0 1.2   GLUCOSE 112* 104*     Recent Labs     10/12/21  1554 10/13/21  0620   WBC 12.7* 11.3*   RBC 4.72 4.77       Patient Active Problem List    Diagnosis Date Noted    Generalized weakness 10/12/2021    Abdominal pain, RUQ 02/28/2017    Bronchitis, acute 02/28/2013       Assessment:  71 yo male w/ surgical history of C4-7 ACDF who presents with gait disturbances, extremity numbness and weakness. He is myelopathic on assessment. MRI cervical spine demonstrates multilevel spondylosis, worse at C3-C4 where there is severe bilateral neural foraminal stenosis. Plan:  1. No emergent neurosurgical intervention, however given patient's myelopathy and cervical spondylosis he may require further cervical decompression this admission. Surgical procedure and timing TBD, will update plan of care once Dr. Marlin Taylor reviews case  2. q 4 hour neuro checks  3. [place] J collar at all times- patient may remove for eating/bathing   4. Will hold DVT prophylaxis tonight and make NPO until case discussed with Dr. Marlin Taylor  5.  Pain control per primary team  6. Thank you for consultation. Will follow closely. Please call with questions/change in neurologic exam.            DISPO-remain inpatient from [facility] [place], YOKASTA-CNP  Neurosurgery Nurse Practitioner  [phone]    Patient was discussed with DR. Perez Quiroz agrees with above assessment and plan. Electronically signed by:  YOKASTA Tran NP, 10/13/2021 4:26 PM

## 2021-10-13 NOTE — CONSULTS
Neurology consult Note    Dr. Irina Min requesting this consult. CC:  Numbness and aching in arms and legs  Reason for Consult: Abnormal gait    HPI:   Radha Tinajero is a 72 y.o. man with PHx sig for ACDF in 2003 who presented with muscle aching and numbness in his hands. Per my interview with the patient, his symptoms started on September 29. He felt weakness and aching in his deltoids. He had recently been exposed to covid so he thought that was causing his symptoms. He had two negative covid tests and one negative flu test.    He saw a new PCP who prescribed him a medrol dose pack. He decided to come to the hospital when he started having difficulty walking. PAST MEDICAL HISTORY:   has a past medical history of BPH (benign prostatic hyperplasia). Patient Active Problem List   Diagnosis    Bronchitis, acute    Abdominal pain, RUQ    Generalized weakness       SURGICAL HISTORY:  Past Surgical History:   Procedure Laterality Date    CERVICAL FUSION  2006    CHOLECYSTECTOMY, LAPAROSCOPIC  2009    LEG SURGERY  2000    benign tumor; partial gastronemeus resection       FAMILY HISTORY:  family history is not on file. SOCIAL HISTORY:  he reports that he has never smoked. He has never used smokeless tobacco. He reports that he does not drink alcohol and does not use drugs.     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    3 kids--new [facility]     Social Determinants of Health     Financial Resource Strain:     Difficulty of Paying Living Expenses:    Food Insecurity:     Worried About Running Out of Food in the Last Year:     [address] in the Last Year:    Transportation Needs:     Lack of Transportation (Medical):      Lack of Transportation (Non-Medical):    Physical Activity:     Days of Exercise per Week:     Minutes of Exercise per Session:    Stress:     Feeling of Stress :    Social Connections:     Frequency of Communication with Friends and Family:     Frequency of Social Gatherings with Friends and Family:     Attends Buddhist Services:     Active Member of Clubs or Organizations:     Attends Club or Organization Meetings:     Marital Status:    Intimate Partner Violence:     Fear of Current or Ex-Partner:     Emotionally Abused:     Physically Abused:     Sexually Abused:        HOME MEDICATIONS:  Medications Prior to Admission: ibuprofen (ADVIL;MOTRIN) 200 MG tablet, Take 200 mg by mouth every 6 hours as needed for Pain  predniSONE (DELTASONE) 20 MG tablet, Take 1 tablet by mouth 2 times daily for 7 days  diclofenac (VOLTAREN) 75 MG EC tablet, Take 1 tablet by mouth 2 times daily (with meals)    CURRENT MEDICATIONS:    Current Facility-Administered Medications:     sodium chloride flush 0.9 % injection 10 mL, 10 mL, IntraVENous, 2 times per day, Esther Godinez MD, 10 mL at 10/13/21 0912    sodium chloride flush 0.9 % injection 10 mL, 10 mL, IntraVENous, PRN, Esther Godinez MD    0.9 % sodium chloride infusion, 25 mL, IntraVENous, PRN, Esther Godinez MD    enoxaparin (LOVENOX) injection 40 mg, 40 mg, SubCUTAneous, Daily, Esther Godinez MD, 40 mg at 10/13/21 0156    ondansetron (ZOFRAN-ODT) disintegrating tablet 4 mg, 4 mg, Oral, Q8H PRN **OR** ondansetron (ZOFRAN) injection 4 mg, 4 mg, IntraVENous, Q6H PRN, Esther Godinez MD    sennosides-docusate sodium (SENOKOT-S) 8.6-50 MG tablet 1 tablet, 1 tablet, Oral, BID, Esther Godinez MD    magnesium hydroxide (MILK OF MAGNESIA) 400 MG/5ML suspension 30 mL, 30 mL, Oral, Daily PRN, Chester Downing MD    famotidine (PEPCID) tablet 20 mg, 20 mg, Oral, BID, Chester Downing MD    acetaminophen (TYLENOL) tablet 650 mg, 650 mg, Oral, Q6H PRN **OR** acetaminophen (TYLENOL) suppository 650 mg, 650 mg, Rectal, Q6H PRN, Chester Downing MD      ROS:   Constitutional- No weight loss or fevers   Eyes- No diplopia. No photophobia. Ears/nose/throat- No dysphagia. No Dysarthria   Cardiovascular- No palpitations. No chest pain   Respiratory- No dyspnea. No Cough   Gastrointestinal- No Abdominal pain. No Vomiting. Genitourinary- No incontinence. No urinary retention   Musculoskeletal- No myalgia. No arthralgia   Skin- No rash. No easy bruising. Psychiatric- No depression. No anxiety   Endocrine- No diabetes. No thyroid issues. Hematologic- No bleeding difficulty. No fatigue   Neurologic- + weakness. No Headache. +numbness in hands    Constitutional  /84   Pulse 74   Temp 98.5 °F (36.9 °C) (Oral)   Resp 18   Ht 5' 7\" (1.702 m)   Wt 182 lb 6.4 oz (82.7 kg)   SpO2 98%   BMI 28.57 kg/m²     General Alert, no distress, well-nourished  Cardiovascular: Rate regular. No murmurs  Respiratory: No adventitious breath sounds  Abdominal: Abdomen is soft, symmetric, and non-tender without distention. Extremities: Upper and lower extremities are atraumatic in appearance without tenderness or deformity. No swelling or erythema.      Neurologic  Mental status:   orientation to person, place, time, situation   Attention intact as able to attend well to the exam     Language fluent in conversation   Comprehension intact; follows simple commands    Cranial nerves:   CN2: Visual Fields full w/o extinction on confrontational testing  CN 3,4,6: pupils equal and reactive to light, extraocular muscles intact,  CN5: facial sensation symmetric   CN7:face symmetric bilaterally   CN8: hearing symmetric to voice  CN9: palate elevated symmetrically  CN11: trap full strength on shoulder shrug  CN12: tongue midline with protrusion  Motor Exam:   R  L    Deltoid 5 5-   Biceps 5 4   Triceps 5 5   Wrist extension  5 5   Interossei 5 5      R  L    Hip flexion  5  4   Knee flexion  5 5   Knee extension  5 5   Ankle dorsiflexion  5 5   Ankle plantar flexion  5 5       Deep tendon reflexes:   Hyperreflexic throughout. White's on Left    Gait: shuffled      Images:    MRI cervical spine wo contrast:   1. Postsurgical changes related to ACDF as detailed above. 2. Multilevel spondylosis is present, worse at C3-C4 where there is severe bilateral neural foraminal stenosis. Assessment: 72year old man with PMH cervical fusion who's history and presentation is consistent with cervical myelopathy. His MRI confirms this with significant findings at the C3-4 level. He's had surgery with Dr. Nichol Nino in the past and so I consulted him for evaluation for surgical intervention.       Plan:  -Neurosurgery consult for cervical stenosis  -No further neurologic workup, we will sign off    RIC Vasquez  Neurology & Neurocritical Care   Neurology Line: [phone]  [employer]: [facility] NPs  [address] [facility], APRN - CNP, 10/13/2021 3:54 PM

## 2021-10-13 NOTE — PROGRESS NOTES
Physical Therapy    Facility/Department: [address]  Initial Assessment / treatment    NAME: Sharad Alexander  : 1955  MRN: [id number]    Date of Service: 10/13/2021    Discharge Recommendations: Sharad Alexander scored a 20/24 on the AM-PAC short mobility form. Current research shows that an AM-PAC score of 18 or greater is typically associated with a discharge to the patient's home setting. Based on the patient's AM-PAC score and their current functional mobility deficits, it is recommended that the patient have 2-3 sessions per week of Physical Therapy at d/c to increase the patient's independence. At this time, this patient demonstrates the endurance and safety to discharge home with home services and a follow up treatment frequency of 2-3x/wk. Please see assessment section for further patient specific details. If patient discharges prior to next session this note will serve as a discharge summary. Please see below for the latest assessment towards goals. PT Equipment Recommendations  Equipment Needed: Yes  Mobility Devices: Alease Peat: Rolling    Assessment   Body structures, Functions, Activity limitations: Decreased functional mobility   Assessment: Pt is 72 y.o. male admit with new onset UE and LE numbness and weakness. Pt is from home with wife and typically very indep including working. Today, pt demos slightly decreased strength L LE, reports of L LE numbness although sensation is intact to light touch. Demos multiple deficits when distracted during functional mobility. Rec rolling walker and home PT as well as 24hr assist.  Will follow  Treatment Diagnosis: impaired gait and transfers  Prognosis: Good  Decision Making: Medium Complexity  PT Education: PT Role;Functional Mobility Training  Patient Education: pt demos understanding  REQUIRES PT FOLLOW UP: Yes       Patient Diagnosis(es): The primary encounter diagnosis was Muscle weakness of extremity.  A diagnosis of Abnormal gait was also pertinent to this visit. has a past medical history of BPH (benign prostatic hyperplasia). has a past surgical history that includes Cholecystectomy, laparoscopic (2009); cervical fusion (2006); and Leg Surgery (2000). Restrictions  Position Activity Restriction  Other position/activity restrictions: up as tolerated  Vision/Hearing  Vision: Within Functional Limits  Vision Exceptions: Wears glasses at all times  Hearing: Within functional limits     Subjective  General  Chart Reviewed: Yes  Patient assessed for rehabilitation services?: Yes  Additional Pertinent Hx: Admit 10/12 with B UE and LE weakness and numbness, feeling off balance; head CT: (-); COVID (-); PMHx: cervical fusion, partial gastroc resection  Referring Practitioner: Jc Dudley MD  Diagnosis: UE and LE numbness/weakness  Subjective  Subjective: Pt found supine in bed. Agreeable to PT.   Pain Screening  Patient Currently in Pain: Denies       Orientation  Orientation  Overall Orientation Status: Within Normal Limits  Social/Functional History  Social/Functional History  Lives With: Spouse  Type of Home: House  Home Access: Stairs to enter with rails  Entrance Stairs - Number of Steps: 5 + 5  Bathroom Shower/Tub: Tub/Shower unit  Bathroom Toilet: Standard  Bathroom Equipment: Grab bars in shower  Home Equipment: Cane  ADL Assistance: Independent  Homemaking Assistance: Independent (wife does the cooking, pt indep with laundry)  Ambulation Assistance: Independent  Transfer Assistance: Independent  Active : Yes  Occupation: Part time employment  Type of occupation:  for [employer]  Cognition        Objective          AROM RLE (degrees)  RLE AROM: WFL  AROM LLE (degrees)  LLE AROM : WFL  Strength RLE  Comment: 5/5 hip flex, knee flex and ext, ankle DF and EHL  Strength LLE  Comment: 4+/5 hip flex and knee flex; 5/5 knee ext, ankle DF and EHL        Bed mobility  Supine to Sit: Independent (HOB flat)  Scooting: Independent  Transfers  Sit to Stand: Stand by assistance  Stand to sit: Contact guard assistance (pt bending foward and reaching toward EOB prior to sitting)  Ambulation  Ambulation?: Yes  Ambulation 1  Device:  (reaching for furniture)  Assistance: Contact guard assistance  Quality of Gait: decreased joseph, wide VIGNESH, increased lateral sway, unsteady but no overt LOB  Distance: 10 ft x 2  Comments: pt also amb 40 ft with cane and CGA to min A with 1 minor LOB when attempting to walk around an obstacle. Pt amb 40 ft with RW and CGA with improved gait pattern overall and improved balance, although heels nearly touching at times, genu varum of L LE, decreased stride length overall  Stairs/Curb  Stairs?: No     Balance  Sitting - Static: Good  Sitting - Dynamic: Good  Standing - Static: Fair  Standing - Dynamic: Fair      Treatment included gait and transfer training, pt education.   Plan   Plan  Times per week: 5-7  Current Treatment Recommendations: Strengthening, Balance Training, Functional Mobility Training, Transfer Training, Gait Training, Stair training, Equipment Evaluation, Education, & procurement, Patient/Caregiver Education & Training  Safety Devices  Type of devices: Call light within reach, Chair alarm in place, Nurse notified, Gait belt, Left in chair    G-Code       OutComes Score                                                  AM-PAC Score  AM-PAC Inpatient Mobility Raw Score : 20 (10/13/21 1011)  AM-PAC Inpatient T-Scale Score : 47.67 (10/13/21 1011)  Mobility Inpatient CMS 0-100% Score: 35.83 (10/13/21 1011)  Mobility Inpatient CMS G-Code Modifier : Tushar Saleh (10/13/21 1011)          Goals  Short term goals  Time Frame for Short term goals: discharge  Short term goal 1: Pt will transfer sit <--> stand with mod I  Short term goal 2: Pt will amb 150 ft with mod I with LRAD  Short term goal 3: Pt will negotiate 5 steps with rail and supervision  Patient Goals   Patient goals : return home       Therapy Time   Individual Concurrent Group Co-treatment   Time In 0822         Time Out 0915         Minutes 53                 Timed Code Treatment Minutes:  38    Total Treatment Minutes:  53    If patient is discharged prior to next treatment, this note will serve as the discharge summary.   Carina Gonsalves, PT, DPT  [id number]

## 2021-10-13 NOTE — PLAN OF CARE
Problem: Falls - Risk of:  Goal: Will remain free from falls  Description: Will remain free from falls  Outcome: Ongoing  Note: Patient is a fall risk. See Fall Risk assessment for details. Bed is in low, lock position; call light/belongings within reach. No attempts to get out of bed have been made, calls appropriately when assistance is needed. Bed alarm and hourly rounds in place for safety. Will continue to monitor and reassess throughout shift.

## 2021-10-13 NOTE — PROGRESS NOTES
Occupational Therapy   Occupational Therapy Initial Assessment/Tx  Date: 10/13/2021   Patient Name: Marilyn Mullen  MRN: [id number]     : 1955    Date of Service: 10/13/2021    Discharge Recommendations: Marilyn Mullen scored a 21/24 on the AM-PAC ADL Inpatient form. Current research shows that an AM-PAC score of 18 or greater is typically associated with a discharge to the patient's home setting. Based on the patient's AM-PAC score, and their current ADL deficits, it is recommended that the patient have 2-3 sessions per week of Occupational Therapy at d/c to increase the patient's independence. At this time, this patient demonstrates the endurance and safety to discharge home with initial 24hr supervision/PRN assistance and home services and a follow up treatment frequency of 2-3x/wk. Please see assessment section for further patient specific details. If patient discharges prior to next session this note will serve as a discharge summary. Please see below for the latest assessment towards goals. OT Equipment Recommendations  Equipment Needed: Yes  Mobility Devices: Caye Orn; ADL Assistive Devices  Walker: Rolling (refer to PT note)  ADL Assistive Devices: Shower Chair with back    Assessment   Performance deficits / Impairments: Decreased ADL status; Decreased coordination;Decreased strength;Decreased sensation;Decreased functional mobility ; Decreased balance;Decreased high-level IADLs;Decreased fine motor control  Assessment: Pt is 73 yo male admitted to [facility] for BUE and BLE weakness/numbness, more prominent on L side. Pt awaiting neurology consult but previously highly independent with all ADLs, iADLs, and working part-time at [employer]. Pt is now below his functional baseline with CGA-min A and 2WW needed for mobiilty 2/2 balance deficits (previously ambulated w/o AD). Pt is also displaying decreased proprioception, dexterity, and strength in LUE.  Pt is able to perform ADLs with increased time and reports it \"just not feeling normal\". Pt reports numbness throughout but able to indentify location of light and deep pressure upon closed eye assessment. Pt would benefit from ongoing acute OT services and home therapy services at discharge with OPOT services following to ensure return to prior level of indepenence. Will cont to follow on acute OT POC. Treatment Diagnosis: decreased independence with fx mobility and coordination/proprioception deficits with LUE affecting independence  Prognosis: Good  Decision Making: Low Complexity  OT Education: OT Role;Plan of Care;Precautions;Transfer Training;Equipment;IADL Safety  Patient Education: pt verb understanding on use of 2WW and shower chair for safety; pt educated on safety during fx mobility  REQUIRES OT FOLLOW UP: Yes  Activity Tolerance  Activity Tolerance: Patient Tolerated treatment well  Safety Devices  Safety Devices in place: Yes  Type of devices: Left in chair;Chair alarm in place;Call light within reach;Nurse notified           Patient Diagnosis(es): The primary encounter diagnosis was Muscle weakness of extremity. A diagnosis of Abnormal gait was also pertinent to this visit. has a past medical history of BPH (benign prostatic hyperplasia). has a past surgical history that includes Cholecystectomy, laparoscopic (2009); cervical fusion (2006); and Leg Surgery (2000). Treatment Diagnosis: decreased independence with fx mobility and coordination/proprioception deficits with LUE affecting independence      Restrictions  Position Activity Restriction  Other position/activity restrictions: up as tolerated    Subjective   General  Chart Reviewed: Yes  Patient assessed for rehabilitation services?: Yes  Additional Pertinent Hx: y.o. male who presents to the ED with weakness and intermittent numbness to bilateral upper and lower extremities. He was tested for COVID-19 on 10/1/2021 and 10/2/2021 which both came back negative.   He 2WW and still requiring CGA 2/2 unsteady gait pattern  Toilet Transfers  Toilet - Technique: Ambulating  Equipment Used: Standard toilet  Toilet Transfer: Contact guard assistance  Toilet Transfers Comments: use of grab bar on R side  ADL  Feeding: Independent  Grooming: Supervision; Increased time to complete (initial difficulty opening toothbrush but pt able to perform; pt opened toothpaste with turning tube with R hand and stabalizing with L hand)  LE Dressing: Contact guard assistance; Increased time to complete (to don shoes and socks on BLE- able to tie shoes with increased time)  Toileting: Contact guard assistance           Transfers  Sit to stand: Stand by assistance  Stand to sit: Contact guard assistance     Cognition  Overall Cognitive Status: [facility]  Safety Judgement:  (decreased with mobility- pt used to being independent and now needing more assistance)        Sensation  Overall Sensation Status: Impaired  Light Touch: No apparent deficits  Sharp/Dull: No apparent deficits  Additional Comments: pt required increased time to bring finger to nose with LUE with eyes open- with eyes closed this improved- possibly attention related        LUE AROM (degrees)  LUE AROM : WNL  Left Hand AROM (degrees)  Left Hand AROM: WNL  RUE AROM (degrees)  RUE AROM : WNL  Right Hand AROM (degrees)  Right Hand AROM: WNL  LUE Strength  Gross LUE Strength: Exceptions to [facility]  L Hand General: 4-/5  RUE Strength  Gross RUE Strength: WFL  R Hand General: 5/5           Treatment included functional transfer training, ADLs, and patient education.          Plan   Plan  Times per week: 5-7  Times per day: Daily  Specific instructions for Next Treatment: awaiting neuro consult for further information regarding pts medical status- recommend performing visual screen next session if seen by OTR/L    G-Code     OutComes Score                                                  AM-PAC Score        AM-PAC Inpatient Daily Activity Raw Score: 21 (10/13/21 1003)  AM-PAC Inpatient ADL T-Scale Score : 44.27 (10/13/21 1003)  ADL Inpatient CMS 0-100% Score: 32.79 (10/13/21 1003)  ADL Inpatient CMS G-Code Modifier : CJ (10/13/21 1003)    Goals  Short term goals  Time Frame for Short term goals: by discharge  Short term goal 1: Pt will perform BUE chair pushups 15x to increase LUE strength for ADLs and fx transfers  Short term goal 2: Pt will ambulate to/from bathroom w/ 2WW and SBA  Short term goal 3: Pt will open 5/5 containers with LUE and no assistance  Patient Goals   Patient goals : not stated       Therapy Time   Individual Concurrent Group Co-treatment   Time In 0830         Time Out 0910         Minutes 40         Timed Code Treatment Minutes: 25 Minutes (+ 15 min OT eval)       Dorothea Corona OT

## 2021-10-14 ENCOUNTER — APPOINTMENT (OUTPATIENT)
Dept: CT IMAGING | Age: 66
DRG: 453 | End: 2021-10-14
Payer: COMMERCIAL

## 2021-10-14 ENCOUNTER — APPOINTMENT (OUTPATIENT)
Dept: GENERAL RADIOLOGY | Age: 66
DRG: 453 | End: 2021-10-14
Payer: COMMERCIAL

## 2021-10-14 LAB
ANION GAP SERPL CALCULATED.3IONS-SCNC: 9 MMOL/L (ref 3–16)
BUN BLDV-MCNC: 27 MG/DL (ref 7–20)
CALCIUM SERPL-MCNC: 8.9 MG/DL (ref 8.3–10.6)
CHLORIDE BLD-SCNC: 104 MMOL/L (ref 99–110)
CO2: 28 MMOL/L (ref 21–32)
CREAT SERPL-MCNC: 1.1 MG/DL (ref 0.8–1.3)
GFR AFRICAN AMERICAN: >60
GFR NON-AFRICAN AMERICAN: >60
GLUCOSE BLD-MCNC: 105 MG/DL (ref 70–99)
HCT VFR BLD CALC: 43.5 % (ref 40.5–52.5)
HEMOGLOBIN: 14.3 G/DL (ref 13.5–17.5)
MCH RBC QN AUTO: 29.7 PG (ref 26–34)
MCHC RBC AUTO-ENTMCNC: 33 G/DL (ref 31–36)
MCV RBC AUTO: 90 FL (ref 80–100)
PDW BLD-RTO: 14.1 % (ref 12.4–15.4)
PLATELET # BLD: 359 K/UL (ref 135–450)
PMV BLD AUTO: 7.6 FL (ref 5–10.5)
POTASSIUM REFLEX MAGNESIUM: 4.7 MMOL/L (ref 3.5–5.1)
RBC # BLD: 4.84 M/UL (ref 4.2–5.9)
SODIUM BLD-SCNC: 141 MMOL/L (ref 136–145)
URINE CULTURE, ROUTINE: NORMAL
WBC # BLD: 11.8 K/UL (ref 4–11)

## 2021-10-14 PROCEDURE — 6370000000 HC RX 637 (ALT 250 FOR IP): Performed by: INTERNAL MEDICINE

## 2021-10-14 PROCEDURE — 80048 BASIC METABOLIC PNL TOTAL CA: CPT

## 2021-10-14 PROCEDURE — 97535 SELF CARE MNGMENT TRAINING: CPT

## 2021-10-14 PROCEDURE — 74177 CT ABD & PELVIS W/CONTRAST: CPT

## 2021-10-14 PROCEDURE — 72125 CT NECK SPINE W/O DYE: CPT

## 2021-10-14 PROCEDURE — 85027 COMPLETE CBC AUTOMATED: CPT

## 2021-10-14 PROCEDURE — 6360000002 HC RX W HCPCS: Performed by: INTERNAL MEDICINE

## 2021-10-14 PROCEDURE — 97116 GAIT TRAINING THERAPY: CPT

## 2021-10-14 PROCEDURE — 72050 X-RAY EXAM NECK SPINE 4/5VWS: CPT

## 2021-10-14 PROCEDURE — 1200000000 HC SEMI PRIVATE

## 2021-10-14 PROCEDURE — 97530 THERAPEUTIC ACTIVITIES: CPT

## 2021-10-14 PROCEDURE — 6360000004 HC RX CONTRAST MEDICATION: Performed by: INTERNAL MEDICINE

## 2021-10-14 PROCEDURE — 2580000003 HC RX 258: Performed by: INTERNAL MEDICINE

## 2021-10-14 PROCEDURE — 36415 COLL VENOUS BLD VENIPUNCTURE: CPT

## 2021-10-14 RX ORDER — SODIUM CHLORIDE 9 MG/ML
INJECTION, SOLUTION INTRAVENOUS CONTINUOUS
Status: DISCONTINUED | OUTPATIENT
Start: 2021-10-15 | End: 2021-10-16

## 2021-10-14 RX ADMIN — IOPAMIDOL 80 ML: 755 INJECTION, SOLUTION INTRAVENOUS at 09:28

## 2021-10-14 RX ADMIN — Medication 10 ML: at 22:55

## 2021-10-14 RX ADMIN — DOCUSATE SODIUM 50 MG AND SENNOSIDES 8.6 MG 1 TABLET: 8.6; 5 TABLET, FILM COATED ORAL at 08:18

## 2021-10-14 RX ADMIN — ENOXAPARIN SODIUM 40 MG: 40 INJECTION SUBCUTANEOUS at 10:15

## 2021-10-14 RX ADMIN — CYANOCOBALAMIN TAB 1000 MCG 500 MCG: 1000 TAB at 08:18

## 2021-10-14 RX ADMIN — Medication 10 ML: at 08:19

## 2021-10-14 ASSESSMENT — PAIN SCALES - GENERAL
PAINLEVEL_OUTOF10: 0
PAINLEVEL_OUTOF10: 0

## 2021-10-14 NOTE — PLAN OF CARE
VSS. Denies any pain. Numbness and tingling still present in all extremities. Neuro assessment remains unchanged. Pt has remained NPO since midnight for possible surgical procedure today. Fall precautions in place. Will continue to monitor. Problem: Falls - Risk of:  Goal: Will remain free from falls  Description: Will remain free from falls  Outcome: Ongoing  Note: Fall precautions in place. Bed locked in lowest position with side rails up x2. Bed exit alarm in use. Nonskid footwear in use. Pt educated on call light system and instructed to use call light prior to getting up. Pt calls out appropriately for needs. Call light and personal belongings within reach. Pt still experiencing weakness of the L leg and is slightly unsteady. Up x1 with walker and tolerates fairly well. Hourly rounding in place.

## 2021-10-14 NOTE — PROGRESS NOTES
NEUROSURGERY PROGRESS NOTE    Talib Reyez   [id number]   1955   10/14/2021    Interval History: admitted with weakness, sensory changes and myelopathy related to cervical stenosis. Plan for surgical decompression tomorrow. Hospital Day #2    Subjective: patient reports continued weakness in left hand, feels it may be worse than yesterday. Discussed MRI findings and surgical plan with patient at bedside. Objective:  /73   Pulse 63   Temp 97.6 °F (36.4 °C) (Oral)   Resp 16   Ht 5' 7\" (1.702 m)   Wt 180 lb 1.6 oz (81.7 kg)   SpO2 97%   BMI 28.21 kg/m²     Labs:  Recent Labs     10/12/21  1554 10/13/21  0620 10/14/21  0649    143 141    103 104   CO2 24 29 28   BUN 23* 24* 27*   CREATININE 1.0 1.2 1.1   GLUCOSE 112* 104* 105*     Recent Labs     10/12/21  1554 10/13/21  0620 10/13/21  1932 10/14/21  0649   WBC 12.7* 11.3*  --  11.8*   RBC 4.72 4.77  --  4.84   INR  --   --  0.96  --      XR CERVICAL SPINE (4-5 VIEWS)   Final Result   Impression: Grade 1 retrolisthesis of C3 on C4 which is mildly accentuated upon extension positioning. MRI CERVICAL SPINE WO CONTRAST   Final Result   1. Postsurgical changes related to ACDF as detailed above. 2. Multilevel spondylosis is present, worse at C3-C4 where there is severe bilateral neural foraminal stenosis. XR CHEST 1 VIEW   Final Result   Impression: No acute abnormality. CT Head WO Contrast   Final Result      No acute intracranial hemorrhage or signs of mass effect.       CT CERVICAL SPINE WO CONTRAST    (Results Pending)         Neurologic Exam:  GCS:  4 - Opens eyes on own  5 - Alert and oriented  6 - Follows simple motor commands    Mental Status: Awake, alert, oriented x 4, speech clear and appropriate  Sensation: Intact to all extremities to light touch  Coordination: Intact    DTRs:    Right  Left    ankle clonus  - +   hoffmans - +       Musculoskeletal:   Gait: Not tested   Tone: normal   Motor strength:    Right  Left    Right  Left    Deltoid  5 4+  Hip Flex  5 4   Biceps  5 4+  Knee Extensors  5 4   Triceps  5 4+  Knee Flexors  5 4   Wrist Ext  5 4-  Ankle Dorsiflex. 5 5   Wrist Flex  5 4-  Ankle Plantarflex. 5 5   Handgrip  5 4-  Ext Edmar Longus  5 5   Thumb Ext  5 4-           Assessment   73 yo male w/ surgical history of C4-7 ACDF who presents with gait disturbances, extremity numbness and weakness. He is myelopathic on assessment. MRI cervical spine demonstrates multilevel spondylosis, worst at C3-C4 where there is severe bilateral neural foraminal stenosis. Plan:  1. No emergent neurosurgical intervention, however given patient's myelopathy and cervical spondylosis he will further cervical decompression this admission. Plan for C3-4 ACDF with possible C3-4 posterior decompression tomorrow afternoon with Dr. Araseli Olmedo   2. q 4 hour neuro checks  3. [place] J collar at all times- patient may remove for eating/bathing   4. [id number] [address] for DVT prophylaxis now, will hold this evening for surgery   5. Pain control per primary team  6. Cervical Flexion/extension xrays and CT cervical spine today for pre op planning   7. Primary attending notified of need for pre op medical clearance   8. NPO @ 0000, treatment consent, pre op labs, ancef on call, Hibiclens shower prior to OR  9. Will follow closely. Please call with questions/change in neurologic exam    DISPO- remain inpatient from [address] [place] [address], YOKASTA-CNP  Neurosurgery Nurse Practitioner  [phone]  Patient was discussed with Dr. Araseli Olmedo who agrees with above assessment and plan. Electronically signed by:  Koren Hodgkins, APRN - NP, 10/14/2021 7:48 AM

## 2021-10-14 NOTE — PROGRESS NOTES
Hospitalist Progress Note      PCP: Megan Enriquez MD    Date of Admission: 10/12/2021    Chief Complaint on Admission: weakness, numbness    Pt Seen/Examined and Chart Reviewed. Admitting dx as above    SUBJECTIVE/OBJECTIVE:     No acute events overnight, feels his left hand is not as dexterous. Numbness continues. No chest pain or dyspnea. Urinating and moving his bowels. Reports no abdominal pain or nausea. Tolerating PO. Allergies  Patient has no known allergies. Medications      Scheduled Meds:   vitamin B-12  500 mcg Oral Daily    sodium chloride flush  10 mL IntraVENous 2 times per day    [Held by provider] enoxaparin  40 mg SubCUTAneous Daily    sennosides-docusate sodium  1 tablet Oral BID    famotidine  20 mg Oral BID       Infusions:   sodium chloride         PRN Meds:  sodium chloride flush, sodium chloride, ondansetron **OR** ondansetron, magnesium hydroxide, acetaminophen **OR** acetaminophen    Vitals    TEMPERATURE:  Current - Temp: 97.9 °F (36.6 °C); Max - Temp  Av °F (36.7 °C)  Min: 97.6 °F (36.4 °C)  Max: 98.5 °F (36.9 °C)  RESPIRATIONS RANGE: Resp  Av  Min: 16  Max: 18  PULSE RANGE: Pulse  Av  Min: 63  Max: 75  BLOOD PRESSURE RANGE:  Systolic (96RKO), DIP:688 , Min:104 , VDT:850   ; Diastolic (73WQY), EBS:29, Min:73, Max:92    PULSE OXIMETRY RANGE: SpO2  Av.7 %  Min: 95 %  Max: 98 %  24HR INTAKE/OUTPUT:      Intake/Output Summary (Last 24 hours) at 10/14/2021 1119  Last data filed at 10/14/2021 0645  Gross per 24 hour   Intake 360 ml   Output 700 ml   Net -340 ml       Exam:      General appearance: No apparent distress, appears stated age and cooperative. Lungs: Clear to ascultation, bilaterally without Rales/Wheezes/Rhonchi with good respiratory effort.   Heart: Regular rate and rhythm with Normal S1/S2 without  murmurs, rubs or gallops, point of maximum impulse non-displaced  Abdomen: Soft, non-tender or non-distended without rigidity or guarding and positive bowel sounds all four quadrants. Extremities: No clubbing, cyanosis, or edema bilaterally. Skin: Skin color, texture, turgor normal.  No rashes or lesions. Neurologic: Alert and oriented X 3, subjective numbness from neck down, slight weakness, of balance  Mental status: Alert, oriented, thought content appropriate. Data    Recent Labs     10/12/21  1554 10/13/21  0620 10/14/21  0649   WBC 12.7* 11.3* 11.8*   HGB 14.0 14.4 14.3   HCT 42.4 43.4 43.5    382 359      Recent Labs     10/12/21  1554 10/13/21  0620 10/14/21  0649    143 141   K 3.9 4.3 4.7    103 104   CO2 24 29 28   BUN 23* 24* 27*   CREATININE 1.0 1.2 1.1     Recent Labs     10/12/21  1554   AST 41*   *   BILIDIR <0.2   BILITOT 0.5   ALKPHOS 81     Recent Labs     10/13/21  1932   INR 0.96     Recent Labs     10/12/21  1554   CKTOTAL 47   TROPONINI <0.01       Consults:     IP CONSULT TO HOSPITALIST  IP CONSULT TO NEUROLOGY  IP CONSULT TO CASE MANAGEMENT  IP CONSULT TO NEUROSURGERY    Active Hospital Problems    Diagnosis Date Noted    Generalized weakness [R53.1] 10/12/2021         ASSESSMENT AND PLAN      Generalized numbness, bilateral upper and lower extremity weakness, unsteady gait:  Due to cervical stenosis with myelopathy at C3-C4 level  Patient will require decompression. Surgery is planned for tomorrow. Continue with neuro checks    Pre op eval:  Patient is medically cleared for OR. EKG non ischemic. Physically active (works as a  prior to admission). No further testing is required. Elevated lipase:  Etiology is not clear. CT A/P non acute. Will refer to GI as outpatient. Right kidney cyst:  Incidental finding on CT. Discussed with patient and family- will refer for outpatient MRI. Systemic leukocytosis:  Without fever, likely due to recent exposure to steroids, monitor for signs and symptoms of infection  UA is clear. COVID negative.      BPH:  Urinating well, continue

## 2021-10-14 NOTE — PROGRESS NOTES
Occupational Therapy  Facility/Department: [address] [facility] [address]  Daily Treatment Note  NAME: Radha Tinajero  : 1955  MRN: [id number]    Date of Service: 10/14/2021    Discharge Recommendations: Radha Tinajero scored a 21/24 on the AM-PAC ADL Inpatient form. Current research shows that an AM-PAC score of 18 or greater is typically associated with a discharge to the patient's home setting. Based on the patient's AM-PAC score, and their current ADL deficits, it is recommended that the patient have 2-3 sessions per week of Occupational Therapy at d/c to increase the patient's independence. At this time, this patient demonstrates the endurance and safety to discharge home with  (home vs OP services) and a follow up treatment frequency of 2-3x/wk. Please see assessment section for further patient specific details. If patient discharges prior to next session this note will serve as a discharge summary. Please see below for the latest assessment towards goals. Assessment   Performance deficits / Impairments: Decreased ADL status; Decreased coordination;Decreased strength;Decreased sensation;Decreased functional mobility ; Decreased balance;Decreased high-level IADLs;Decreased fine motor control  Assessment: Pt cont to function below baseline with CGA and RW. Plan for neuro surgery tomorrow. Pt cont to demo decreased proprioception, [address], and strength in LUE. Completing ADLs in stance at sink. [address] task open close toothpaste and open close bag of candy. Pt would benefit from ongoing acute OT services and home services at discharge. Cont to follow on acute OT POC. OT Education: OT Role;Plan of Care;IADL Safety;Precautions  Patient Education: pt verb understanding  Activity Tolerance  Activity Tolerance: Patient Tolerated treatment well  Safety Devices  Safety Devices in place: Yes  Type of devices: Left in chair;Chair alarm in place;Call light within reach;Nurse notified; All fall risk precautions in place;Gait belt         Patient Diagnosis(es): The primary encounter diagnosis was Muscle weakness of extremity. A diagnosis of Abnormal gait was also pertinent to this visit. has a past medical history of BPH (benign prostatic hyperplasia). has a past surgical history that includes Cholecystectomy, laparoscopic (2009); cervical fusion (2006); and Leg Surgery (2000). Restrictions  Position Activity Restriction  Other position/activity restrictions: up as tolerated  Subjective   General  Chart Reviewed: Yes  Patient assessed for rehabilitation services?: Yes  Additional Pertinent Hx: y.o. male who presents to the ED with weakness and intermittent numbness to bilateral upper and lower extremities. He was tested for COVID-19 on 10/1/2021 and 10/2/2021 which both came back negative. He is also tested negative for influenza. He did follow-up with a primary care provider on 10/7/2021 and was started on prednisone. Fatigue and chills improved since starting the prednisone, however, the weakness has remained. Current consult for neurology. Pt has fallen 2x. CT head (-) ; XR chest (-)  Referring Practitioner: Mana Gtz MD  Diagnosis: abnormal gait  Subjective  Subjective: In bed upon arrival, agreeable to session      Orientation  Orientation  Overall Orientation Status: Within Normal Limits  Objective    ADL  Grooming: Stand by assistance (stance at sink, oral care, comb hair, wipe face)  LE Dressing: Contact guard assistance; Increased time to complete (matias/doff socks figue 4)  Toileting:  (declined need)        Balance  Sitting Balance: Independent  Standing Balance: Contact guard assistance  Standing Balance  Time: ~ 10 min  Activity: to and from bathroom, mobility in room, hallway  Functional Mobility  Functional - Mobility Device: Rolling Walker  Activity: To/from bathroom; Other  Functional Mobility Comments: CGA, increased cues RW management     Transfers  Sit to stand: Stand by

## 2021-10-14 NOTE — PROGRESS NOTES
10/14/21    Neurosurgery      Hx noted and reviewed with patient    He has progressive cervical myelopathy      Exam  demonstrates difficulty with gait    Weakness RUE hollie Biceps, BR, TC and wrist grasp    MRI Severe cord compression with edema at C3-4 underreported by radiologist    CT poss pseudarthrosis C6-7.     P: ACDF C3-4, possible revise C6-7 with anterior plate H9-8    Probable posterior decompression C3-4, oss fusion posterior    Evokes    Surgery, risks and complications discussed fully with patient      Darshana Darby MD

## 2021-10-14 NOTE — PROGRESS NOTES
Physical Therapy  Daily Treatment Note    Discharge Recommendations: Sade Burger scored a 19/24 on the AM-PAC short mobility form. Current research shows that an AM-PAC score of 18 or greater is typically associated with a discharge to the patient's home setting. Based on the patient's AM-PAC score and their current functional mobility deficits, it is recommended that the patient have 2-3 sessions per week of Physical Therapy at d/ to increase the patient's independence. At this time, this patient demonstrates the endurance and safety to discharge home with PT (home vs OP services) and a follow up treatment frequency of 2-3x/wk. Please see assessment section for further patient specific details. Assessment:  Pt with improved gait tolerance. Seems motivated to regain strength and function. Plan is for home with wife at D/, following c-spine surgery tomorrow. Pt may need a wheeled walker at D/--will continue to assess. Equipment Needs: Will assess s/p surgery    Chart Reviewed: Yes     Other position/activity restrictions: up as tolerated   Additional Pertinent Hx: Admit 10/12 with B UE and LE weakness and numbness, feeling off balance; head CT: (-); COVID (-); PMHx: cervical fusion, partial gastroc resection      Diagnosis: UE and LE numbness/weakness   Treatment Diagnosis: impaired gait and transfers    Subjective: Pt in bed initially. Agreeable to working with PT. Relieved to have a diagnosis. Looking forward to surgery tomorrow. Pain: No c/o     Objective:    Bed mobility  Supine to sit: Supervision, HOB up partially with use of rail  Scooting: Supervision to EOB    Transfers  Sit to stand: SBA from bed  Stand to sit: SBA onto bed    Ambulation  Assistance Level: CGA  Assistive device: Wheeled walker  Distance: 160 ft in molina  Quality of gait: Step-through pattern; mild limp L LE (chronic, per pt report); no LOB    Patient Education  Role of PT. Expressed understanding.      Safety Devices  Pt left sitting EOB with OT present. AM-PAC score  AM-PAC Inpatient Mobility Raw Score : 19  AM-PAC Inpatient T-Scale Score : 45.44  Mobility Inpatient CMS 0-100% Score: 41.77  Mobility Inpatient CMS G-Code Modifier : CK    Goals: (as determined and assessed by primary PT)  Time Frame for Short term goals: discharge  Short term goal 1: Pt will transfer sit <--> stand with mod I   Short term goal 2: Pt will amb 150 ft with mod I with LRAD   Short term goal 3: Pt will negotiate 5 steps with rail and supervision      Plan:  Times per week: 5-7;    Current Treatment Recommendations: Strengthening, Balance Training, Functional Mobility Training, Transfer Training, Gait Training, Stair training, Equipment Evaluation, Education, & procurement, Patient/Caregiver Education & Training    Therapy Time    Individual  Concurrent  Group  Co-treatment    Time In  1435            Time Out  1446            Minutes  11              Timed Code Treatment Minutes: 11  Total Treatment Minutes: 11    Will continue per plan of care. If patient is discharged prior to next treatment, this note will serve as the discharge summary. [place], [id number]\  Goals ongoing.   Maddi Foster, [address] [facility]

## 2021-10-15 ENCOUNTER — APPOINTMENT (OUTPATIENT)
Dept: GENERAL RADIOLOGY | Age: 66
DRG: 453 | End: 2021-10-15
Payer: COMMERCIAL

## 2021-10-15 ENCOUNTER — ANESTHESIA (OUTPATIENT)
Dept: OPERATING ROOM | Age: 66
DRG: 453 | End: 2021-10-15
Payer: COMMERCIAL

## 2021-10-15 ENCOUNTER — ANESTHESIA EVENT (OUTPATIENT)
Dept: OPERATING ROOM | Age: 66
DRG: 453 | End: 2021-10-15
Payer: COMMERCIAL

## 2021-10-15 VITALS
DIASTOLIC BLOOD PRESSURE: 76 MMHG | TEMPERATURE: 98.4 F | OXYGEN SATURATION: 100 % | RESPIRATION RATE: 16 BRPM | SYSTOLIC BLOOD PRESSURE: 123 MMHG

## 2021-10-15 PROBLEM — G95.9 CERVICAL MYELOPATHY (HCC): Status: ACTIVE | Noted: 2021-10-15

## 2021-10-15 LAB
ABO/RH: NORMAL
ANION GAP SERPL CALCULATED.3IONS-SCNC: 12 MMOL/L (ref 3–16)
ANTIBODY SCREEN: NORMAL
APPEARANCE FLUID: NORMAL
BUN BLDV-MCNC: 28 MG/DL (ref 7–20)
CALCIUM SERPL-MCNC: 8.5 MG/DL (ref 8.3–10.6)
CELL COUNT FLUID TYPE: NORMAL
CHLORIDE BLD-SCNC: 101 MMOL/L (ref 99–110)
CLOT EVALUATION: NORMAL
CO2: 24 MMOL/L (ref 21–32)
COLOR FLUID: YELLOW
CREAT SERPL-MCNC: 1.1 MG/DL (ref 0.8–1.3)
GFR AFRICAN AMERICAN: >60
GFR NON-AFRICAN AMERICAN: >60
GLUCOSE BLD-MCNC: 120 MG/DL (ref 70–99)
HCT VFR BLD CALC: 42.9 % (ref 40.5–52.5)
HEMOGLOBIN: 14.5 G/DL (ref 13.5–17.5)
INR BLD: 1.01 (ref 0.88–1.12)
MCH RBC QN AUTO: 30.2 PG (ref 26–34)
MCHC RBC AUTO-ENTMCNC: 33.8 G/DL (ref 31–36)
MCV RBC AUTO: 89.4 FL (ref 80–100)
MONOCYTE, FLUID: 9 %
NEUTROPHIL, FLUID: 91 %
NUCLEATED CELLS FLUID: NORMAL /CUMM
NUMBER OF CELLS COUNTED FLUID: 100
PDW BLD-RTO: 13.7 % (ref 12.4–15.4)
PLATELET # BLD: 398 K/UL (ref 135–450)
PMV BLD AUTO: 7.4 FL (ref 5–10.5)
POTASSIUM REFLEX MAGNESIUM: 4.4 MMOL/L (ref 3.5–5.1)
PROTHROMBIN TIME: 11.4 SEC (ref 9.9–12.7)
RBC # BLD: 4.8 M/UL (ref 4.2–5.9)
RBC FLUID: 1200 /CUMM
SODIUM BLD-SCNC: 137 MMOL/L (ref 136–145)
WBC # BLD: 13.1 K/UL (ref 4–11)

## 2021-10-15 PROCEDURE — 86901 BLOOD TYPING SEROLOGIC RH(D): CPT

## 2021-10-15 PROCEDURE — 2720000010 HC SURG SUPPLY STERILE: Performed by: NEUROLOGICAL SURGERY

## 2021-10-15 PROCEDURE — 6360000002 HC RX W HCPCS: Performed by: PHYSICIAN ASSISTANT

## 2021-10-15 PROCEDURE — 36415 COLL VENOUS BLD VENIPUNCTURE: CPT

## 2021-10-15 PROCEDURE — 6360000002 HC RX W HCPCS: Performed by: NURSE ANESTHETIST, CERTIFIED REGISTERED

## 2021-10-15 PROCEDURE — 2700000000 HC OXYGEN THERAPY PER DAY

## 2021-10-15 PROCEDURE — 3209999900 FLUORO FOR SURGICAL PROCEDURES

## 2021-10-15 PROCEDURE — 85610 PROTHROMBIN TIME: CPT

## 2021-10-15 PROCEDURE — C1889 IMPLANT/INSERT DEVICE, NOC: HCPCS | Performed by: NEUROLOGICAL SURGERY

## 2021-10-15 PROCEDURE — 2580000003 HC RX 258: Performed by: NURSE PRACTITIONER

## 2021-10-15 PROCEDURE — 2580000003 HC RX 258: Performed by: NEUROLOGICAL SURGERY

## 2021-10-15 PROCEDURE — 6370000000 HC RX 637 (ALT 250 FOR IP): Performed by: INTERNAL MEDICINE

## 2021-10-15 PROCEDURE — 87205 SMEAR GRAM STAIN: CPT

## 2021-10-15 PROCEDURE — 1200000000 HC SEMI PRIVATE

## 2021-10-15 PROCEDURE — 80048 BASIC METABOLIC PNL TOTAL CA: CPT

## 2021-10-15 PROCEDURE — 86850 RBC ANTIBODY SCREEN: CPT

## 2021-10-15 PROCEDURE — 86900 BLOOD TYPING SEROLOGIC ABO: CPT

## 2021-10-15 PROCEDURE — 7100000001 HC PACU RECOVERY - ADDTL 15 MIN: Performed by: NEUROLOGICAL SURGERY

## 2021-10-15 PROCEDURE — 3600000014 HC SURGERY LEVEL 4 ADDTL 15MIN: Performed by: NEUROLOGICAL SURGERY

## 2021-10-15 PROCEDURE — 2709999900 HC NON-CHARGEABLE SUPPLY: Performed by: NEUROLOGICAL SURGERY

## 2021-10-15 PROCEDURE — 89051 BODY FLUID CELL COUNT: CPT

## 2021-10-15 PROCEDURE — C1713 ANCHOR/SCREW BN/BN,TIS/BN: HCPCS | Performed by: NEUROLOGICAL SURGERY

## 2021-10-15 PROCEDURE — 72040 X-RAY EXAM NECK SPINE 2-3 VW: CPT

## 2021-10-15 PROCEDURE — 6360000002 HC RX W HCPCS: Performed by: NEUROLOGICAL SURGERY

## 2021-10-15 PROCEDURE — 3700000000 HC ANESTHESIA ATTENDED CARE: Performed by: NEUROLOGICAL SURGERY

## 2021-10-15 PROCEDURE — 2580000003 HC RX 258: Performed by: PHYSICIAN ASSISTANT

## 2021-10-15 PROCEDURE — 2580000003 HC RX 258: Performed by: NURSE ANESTHETIST, CERTIFIED REGISTERED

## 2021-10-15 PROCEDURE — 0S9D3ZZ DRAINAGE OF LEFT KNEE JOINT, PERCUTANEOUS APPROACH: ICD-10-PCS | Performed by: ORTHOPAEDIC SURGERY

## 2021-10-15 PROCEDURE — 87070 CULTURE OTHR SPECIMN AEROBIC: CPT

## 2021-10-15 PROCEDURE — 3700000001 HC ADD 15 MINUTES (ANESTHESIA): Performed by: NEUROLOGICAL SURGERY

## 2021-10-15 PROCEDURE — 85027 COMPLETE CBC AUTOMATED: CPT

## 2021-10-15 PROCEDURE — 7100000000 HC PACU RECOVERY - FIRST 15 MIN: Performed by: NEUROLOGICAL SURGERY

## 2021-10-15 PROCEDURE — 3600000004 HC SURGERY LEVEL 4 BASE: Performed by: NEUROLOGICAL SURGERY

## 2021-10-15 PROCEDURE — 6360000002 HC RX W HCPCS: Performed by: STUDENT IN AN ORGANIZED HEALTH CARE EDUCATION/TRAINING PROGRAM

## 2021-10-15 PROCEDURE — 94761 N-INVAS EAR/PLS OXIMETRY MLT: CPT

## 2021-10-15 PROCEDURE — 2500000003 HC RX 250 WO HCPCS: Performed by: NEUROLOGICAL SURGERY

## 2021-10-15 DEVICE — SPACER SPNL LORDTC 360 DEG STD 14X13X8 MM STRL ACIS PROTI: Type: IMPLANTABLE DEVICE | Site: SPINE CERVICAL | Status: FUNCTIONAL

## 2021-10-15 DEVICE — SCREW SPNL 3.5X12MM SYMPHONY: Type: IMPLANTABLE DEVICE | Site: SPINE CERVICAL | Status: FUNCTIONAL

## 2021-10-15 DEVICE — IMPLANTABLE DEVICE: Type: IMPLANTABLE DEVICE | Site: SPINE CERVICAL | Status: FUNCTIONAL

## 2021-10-15 DEVICE — SCREW SPNL L14MM DIA4MM ANT CERV TI SELF DRL VAR ANG FULL: Type: IMPLANTABLE DEVICE | Site: SPINE CERVICAL | Status: FUNCTIONAL

## 2021-10-15 DEVICE — SCREW SPINAL F/SYMPHONY OCT SYSTEM: Type: IMPLANTABLE DEVICE | Site: SPINE CERVICAL | Status: FUNCTIONAL

## 2021-10-15 DEVICE — SPACER SPNL LORDTC 360 DEG STD 14X13X7 MM STRL ACIS PROTI: Type: IMPLANTABLE DEVICE | Site: SPINE CERVICAL | Status: FUNCTIONAL

## 2021-10-15 DEVICE — GRAFT BNE SUB M 5ML CANC DBM FRMBL CELLULAR VIVIGEN: Type: IMPLANTABLE DEVICE | Site: SPINE CERVICAL | Status: FUNCTIONAL

## 2021-10-15 RX ORDER — SUCCINYLCHOLINE CHLORIDE 20 MG/ML
INJECTION INTRAMUSCULAR; INTRAVENOUS PRN
Status: DISCONTINUED | OUTPATIENT
Start: 2021-10-15 | End: 2021-10-15 | Stop reason: SDUPTHER

## 2021-10-15 RX ORDER — FENTANYL CITRATE 50 UG/ML
INJECTION, SOLUTION INTRAMUSCULAR; INTRAVENOUS PRN
Status: DISCONTINUED | OUTPATIENT
Start: 2021-10-15 | End: 2021-10-15 | Stop reason: SDUPTHER

## 2021-10-15 RX ORDER — ONDANSETRON 2 MG/ML
4 INJECTION INTRAMUSCULAR; INTRAVENOUS
Status: DISCONTINUED | OUTPATIENT
Start: 2021-10-15 | End: 2021-10-15 | Stop reason: HOSPADM

## 2021-10-15 RX ORDER — CEFAZOLIN SODIUM 1 G/3ML
INJECTION, POWDER, FOR SOLUTION INTRAMUSCULAR; INTRAVENOUS PRN
Status: DISCONTINUED | OUTPATIENT
Start: 2021-10-15 | End: 2021-10-15 | Stop reason: SDUPTHER

## 2021-10-15 RX ORDER — FENTANYL CITRATE 50 UG/ML
50 INJECTION, SOLUTION INTRAMUSCULAR; INTRAVENOUS EVERY 5 MIN PRN
Status: DISCONTINUED | OUTPATIENT
Start: 2021-10-15 | End: 2021-10-15 | Stop reason: HOSPADM

## 2021-10-15 RX ORDER — SODIUM CHLORIDE, SODIUM LACTATE, POTASSIUM CHLORIDE, CALCIUM CHLORIDE 600; 310; 30; 20 MG/100ML; MG/100ML; MG/100ML; MG/100ML
INJECTION, SOLUTION INTRAVENOUS CONTINUOUS PRN
Status: DISCONTINUED | OUTPATIENT
Start: 2021-10-15 | End: 2021-10-15 | Stop reason: SDUPTHER

## 2021-10-15 RX ORDER — HYDRALAZINE HYDROCHLORIDE 20 MG/ML
5 INJECTION INTRAMUSCULAR; INTRAVENOUS EVERY 10 MIN PRN
Status: DISCONTINUED | OUTPATIENT
Start: 2021-10-15 | End: 2021-10-15 | Stop reason: HOSPADM

## 2021-10-15 RX ORDER — METHYLPREDNISOLONE ACETATE 80 MG/ML
INJECTION, SUSPENSION INTRA-ARTICULAR; INTRALESIONAL; INTRAMUSCULAR; SOFT TISSUE PRN
Status: DISCONTINUED | OUTPATIENT
Start: 2021-10-15 | End: 2021-10-15 | Stop reason: ALTCHOICE

## 2021-10-15 RX ORDER — DEXAMETHASONE SODIUM PHOSPHATE 4 MG/ML
INJECTION, SOLUTION INTRA-ARTICULAR; INTRALESIONAL; INTRAMUSCULAR; INTRAVENOUS; SOFT TISSUE PRN
Status: DISCONTINUED | OUTPATIENT
Start: 2021-10-15 | End: 2021-10-15 | Stop reason: SDUPTHER

## 2021-10-15 RX ORDER — PROPOFOL 10 MG/ML
INJECTION, EMULSION INTRAVENOUS CONTINUOUS PRN
Status: DISCONTINUED | OUTPATIENT
Start: 2021-10-15 | End: 2021-10-15 | Stop reason: SDUPTHER

## 2021-10-15 RX ORDER — PROCHLORPERAZINE EDISYLATE 5 MG/ML
5 INJECTION INTRAMUSCULAR; INTRAVENOUS
Status: DISCONTINUED | OUTPATIENT
Start: 2021-10-15 | End: 2021-10-15 | Stop reason: HOSPADM

## 2021-10-15 RX ORDER — DIPHENHYDRAMINE HYDROCHLORIDE 50 MG/ML
12.5 INJECTION INTRAMUSCULAR; INTRAVENOUS
Status: DISCONTINUED | OUTPATIENT
Start: 2021-10-15 | End: 2021-10-15 | Stop reason: HOSPADM

## 2021-10-15 RX ORDER — LABETALOL HYDROCHLORIDE 5 MG/ML
5 INJECTION, SOLUTION INTRAVENOUS EVERY 10 MIN PRN
Status: DISCONTINUED | OUTPATIENT
Start: 2021-10-15 | End: 2021-10-15 | Stop reason: HOSPADM

## 2021-10-15 RX ORDER — OXYCODONE HYDROCHLORIDE AND ACETAMINOPHEN 5; 325 MG/1; MG/1
1 TABLET ORAL PRN
Status: DISCONTINUED | OUTPATIENT
Start: 2021-10-15 | End: 2021-10-15 | Stop reason: HOSPADM

## 2021-10-15 RX ORDER — ONDANSETRON 2 MG/ML
INJECTION INTRAMUSCULAR; INTRAVENOUS PRN
Status: DISCONTINUED | OUTPATIENT
Start: 2021-10-15 | End: 2021-10-15 | Stop reason: SDUPTHER

## 2021-10-15 RX ORDER — OXYCODONE HYDROCHLORIDE AND ACETAMINOPHEN 5; 325 MG/1; MG/1
2 TABLET ORAL PRN
Status: DISCONTINUED | OUTPATIENT
Start: 2021-10-15 | End: 2021-10-15 | Stop reason: HOSPADM

## 2021-10-15 RX ORDER — OXYCODONE HYDROCHLORIDE 5 MG/1
10 TABLET ORAL EVERY 4 HOURS PRN
Status: DISCONTINUED | OUTPATIENT
Start: 2021-10-15 | End: 2021-10-18 | Stop reason: HOSPADM

## 2021-10-15 RX ORDER — REMIFENTANIL HYDROCHLORIDE 1 MG/ML
INJECTION, POWDER, LYOPHILIZED, FOR SOLUTION INTRAVENOUS CONTINUOUS PRN
Status: DISCONTINUED | OUTPATIENT
Start: 2021-10-15 | End: 2021-10-15 | Stop reason: SDUPTHER

## 2021-10-15 RX ORDER — PROPOFOL 10 MG/ML
INJECTION, EMULSION INTRAVENOUS PRN
Status: DISCONTINUED | OUTPATIENT
Start: 2021-10-15 | End: 2021-10-15 | Stop reason: SDUPTHER

## 2021-10-15 RX ORDER — FENTANYL CITRATE 50 UG/ML
25 INJECTION, SOLUTION INTRAMUSCULAR; INTRAVENOUS EVERY 5 MIN PRN
Status: DISCONTINUED | OUTPATIENT
Start: 2021-10-15 | End: 2021-10-15 | Stop reason: HOSPADM

## 2021-10-15 RX ADMIN — SODIUM CHLORIDE, SODIUM LACTATE, POTASSIUM CHLORIDE, AND CALCIUM CHLORIDE: .6; .31; .03; .02 INJECTION, SOLUTION INTRAVENOUS at 18:11

## 2021-10-15 RX ADMIN — PROPOFOL 170 MCG/KG/MIN: 10 INJECTION, EMULSION INTRAVENOUS at 15:24

## 2021-10-15 RX ADMIN — SUCCINYLCHOLINE CHLORIDE 120 MG: 20 INJECTION, SOLUTION INTRAMUSCULAR; INTRAVENOUS; PARENTERAL at 15:26

## 2021-10-15 RX ADMIN — ONDANSETRON 4 MG: 2 INJECTION INTRAMUSCULAR; INTRAVENOUS at 15:22

## 2021-10-15 RX ADMIN — METHOCARBAMOL 1000 MG: 100 INJECTION, SOLUTION INTRAMUSCULAR; INTRAVENOUS at 20:01

## 2021-10-15 RX ADMIN — SODIUM CHLORIDE: 9 INJECTION, SOLUTION INTRAVENOUS at 17:41

## 2021-10-15 RX ADMIN — PHENYLEPHRINE HYDROCHLORIDE 100 MCG: 10 INJECTION, SOLUTION INTRAMUSCULAR; INTRAVENOUS; SUBCUTANEOUS at 18:12

## 2021-10-15 RX ADMIN — FENTANYL CITRATE 200 MCG: 50 INJECTION, SOLUTION INTRAMUSCULAR; INTRAVENOUS at 15:24

## 2021-10-15 RX ADMIN — DOCUSATE SODIUM 50 MG AND SENNOSIDES 8.6 MG 1 TABLET: 8.6; 5 TABLET, FILM COATED ORAL at 09:19

## 2021-10-15 RX ADMIN — FENTANYL CITRATE 25 MCG: 50 INJECTION INTRAMUSCULAR; INTRAVENOUS at 19:48

## 2021-10-15 RX ADMIN — ACETAMINOPHEN 650 MG: 325 TABLET ORAL at 09:19

## 2021-10-15 RX ADMIN — PROPOFOL 100 MG: 10 INJECTION, EMULSION INTRAVENOUS at 15:24

## 2021-10-15 RX ADMIN — CEFAZOLIN SODIUM 2000 MG: 1 POWDER, FOR SOLUTION INTRAMUSCULAR; INTRAVENOUS at 15:48

## 2021-10-15 RX ADMIN — CYANOCOBALAMIN TAB 1000 MCG 500 MCG: 1000 TAB at 09:19

## 2021-10-15 RX ADMIN — REMIFENTANIL HYDROCHLORIDE 0.2 MCG/KG/MIN: 1 INJECTION, POWDER, LYOPHILIZED, FOR SOLUTION INTRAVENOUS at 15:22

## 2021-10-15 RX ADMIN — DEXAMETHASONE SODIUM PHOSPHATE 10 MG: 4 INJECTION, SOLUTION INTRAMUSCULAR; INTRAVENOUS at 15:32

## 2021-10-15 RX ADMIN — PHENYLEPHRINE HYDROCHLORIDE 25 MCG/MIN: 10 INJECTION, SOLUTION INTRAMUSCULAR; INTRAVENOUS; SUBCUTANEOUS at 15:27

## 2021-10-15 RX ADMIN — PHENYLEPHRINE HYDROCHLORIDE 100 MCG: 10 INJECTION, SOLUTION INTRAMUSCULAR; INTRAVENOUS; SUBCUTANEOUS at 17:57

## 2021-10-15 RX ADMIN — SODIUM CHLORIDE, SODIUM LACTATE, POTASSIUM CHLORIDE, AND CALCIUM CHLORIDE: .6; .31; .03; .02 INJECTION, SOLUTION INTRAVENOUS at 15:35

## 2021-10-15 RX ADMIN — PHENYLEPHRINE HYDROCHLORIDE 100 MCG: 10 INJECTION, SOLUTION INTRAMUSCULAR; INTRAVENOUS; SUBCUTANEOUS at 17:54

## 2021-10-15 RX ADMIN — PROPOFOL 100 MG: 10 INJECTION, EMULSION INTRAVENOUS at 15:25

## 2021-10-15 RX ADMIN — PHENYLEPHRINE HYDROCHLORIDE 100 MCG: 10 INJECTION, SOLUTION INTRAMUSCULAR; INTRAVENOUS; SUBCUTANEOUS at 17:39

## 2021-10-15 RX ADMIN — SODIUM CHLORIDE: 9 INJECTION, SOLUTION INTRAVENOUS at 00:09

## 2021-10-15 ASSESSMENT — PULMONARY FUNCTION TESTS
PIF_VALUE: 29
PIF_VALUE: 23
PIF_VALUE: 23
PIF_VALUE: 30
PIF_VALUE: 28
PIF_VALUE: 22
PIF_VALUE: 29
PIF_VALUE: 19
PIF_VALUE: 29
PIF_VALUE: 30
PIF_VALUE: 30
PIF_VALUE: 21
PIF_VALUE: 19
PIF_VALUE: 29
PIF_VALUE: 22
PIF_VALUE: 23
PIF_VALUE: 22
PIF_VALUE: 23
PIF_VALUE: 29
PIF_VALUE: 21
PIF_VALUE: 23
PIF_VALUE: 2
PIF_VALUE: 22
PIF_VALUE: 29
PIF_VALUE: 22
PIF_VALUE: 23
PIF_VALUE: 30
PIF_VALUE: 0
PIF_VALUE: 23
PIF_VALUE: 29
PIF_VALUE: 23
PIF_VALUE: 29
PIF_VALUE: 23
PIF_VALUE: 23
PIF_VALUE: 29
PIF_VALUE: 23
PIF_VALUE: 22
PIF_VALUE: 29
PIF_VALUE: 29
PIF_VALUE: 23
PIF_VALUE: 28
PIF_VALUE: 22
PIF_VALUE: 0
PIF_VALUE: 30
PIF_VALUE: 23
PIF_VALUE: 23
PIF_VALUE: 21
PIF_VALUE: 16
PIF_VALUE: 30
PIF_VALUE: 22
PIF_VALUE: 22
PIF_VALUE: 1
PIF_VALUE: 23
PIF_VALUE: 29
PIF_VALUE: 2
PIF_VALUE: 29
PIF_VALUE: 23
PIF_VALUE: 26
PIF_VALUE: 21
PIF_VALUE: 23
PIF_VALUE: 29
PIF_VALUE: 19
PIF_VALUE: 29
PIF_VALUE: 19
PIF_VALUE: 0
PIF_VALUE: 1
PIF_VALUE: 1
PIF_VALUE: 29
PIF_VALUE: 23
PIF_VALUE: 31
PIF_VALUE: 30
PIF_VALUE: 29
PIF_VALUE: 10
PIF_VALUE: 23
PIF_VALUE: 29
PIF_VALUE: 29
PIF_VALUE: 22
PIF_VALUE: 23
PIF_VALUE: 22
PIF_VALUE: 1
PIF_VALUE: 29
PIF_VALUE: 23
PIF_VALUE: 22
PIF_VALUE: 30
PIF_VALUE: 27
PIF_VALUE: 23
PIF_VALUE: 1
PIF_VALUE: 29
PIF_VALUE: 23
PIF_VALUE: 22
PIF_VALUE: 6
PIF_VALUE: 22
PIF_VALUE: 23
PIF_VALUE: 23
PIF_VALUE: 3
PIF_VALUE: 30
PIF_VALUE: 22
PIF_VALUE: 29
PIF_VALUE: 19
PIF_VALUE: 21
PIF_VALUE: 1
PIF_VALUE: 22
PIF_VALUE: 22
PIF_VALUE: 23
PIF_VALUE: 26
PIF_VALUE: 29
PIF_VALUE: 22
PIF_VALUE: 22
PIF_VALUE: 23
PIF_VALUE: 23
PIF_VALUE: 13
PIF_VALUE: 20
PIF_VALUE: 19
PIF_VALUE: 23
PIF_VALUE: 23
PIF_VALUE: 29
PIF_VALUE: 3
PIF_VALUE: 29
PIF_VALUE: 22
PIF_VALUE: 19
PIF_VALUE: 23
PIF_VALUE: 22
PIF_VALUE: 22
PIF_VALUE: 23
PIF_VALUE: 23
PIF_VALUE: 29
PIF_VALUE: 22
PIF_VALUE: 2
PIF_VALUE: 19
PIF_VALUE: 23
PIF_VALUE: 30
PIF_VALUE: 23
PIF_VALUE: 23
PIF_VALUE: 29
PIF_VALUE: 22
PIF_VALUE: 22
PIF_VALUE: 23
PIF_VALUE: 29
PIF_VALUE: 23
PIF_VALUE: 29
PIF_VALUE: 29
PIF_VALUE: 23
PIF_VALUE: 22
PIF_VALUE: 3
PIF_VALUE: 29
PIF_VALUE: 23
PIF_VALUE: 23
PIF_VALUE: 31
PIF_VALUE: 21
PIF_VALUE: 31
PIF_VALUE: 18
PIF_VALUE: 9
PIF_VALUE: 23
PIF_VALUE: 23
PIF_VALUE: 29
PIF_VALUE: 23
PIF_VALUE: 23
PIF_VALUE: 19
PIF_VALUE: 29
PIF_VALUE: 21
PIF_VALUE: 22
PIF_VALUE: 23
PIF_VALUE: 30
PIF_VALUE: 22
PIF_VALUE: 27
PIF_VALUE: 23
PIF_VALUE: 29
PIF_VALUE: 29
PIF_VALUE: 30
PIF_VALUE: 23
PIF_VALUE: 29
PIF_VALUE: 23
PIF_VALUE: 23
PIF_VALUE: 29
PIF_VALUE: 29
PIF_VALUE: 21
PIF_VALUE: 22
PIF_VALUE: 29
PIF_VALUE: 29
PIF_VALUE: 23
PIF_VALUE: 29
PIF_VALUE: 23
PIF_VALUE: 23
PIF_VALUE: 29
PIF_VALUE: 22
PIF_VALUE: 23
PIF_VALUE: 29
PIF_VALUE: 29
PIF_VALUE: 3
PIF_VALUE: 22
PIF_VALUE: 24
PIF_VALUE: 30
PIF_VALUE: 22
PIF_VALUE: 22
PIF_VALUE: 21
PIF_VALUE: 22
PIF_VALUE: 29
PIF_VALUE: 30
PIF_VALUE: 23
PIF_VALUE: 29
PIF_VALUE: 29
PIF_VALUE: 23
PIF_VALUE: 30
PIF_VALUE: 30
PIF_VALUE: 23
PIF_VALUE: 22
PIF_VALUE: 30
PIF_VALUE: 23
PIF_VALUE: 29
PIF_VALUE: 29
PIF_VALUE: 23
PIF_VALUE: 23
PIF_VALUE: 0

## 2021-10-15 ASSESSMENT — PAIN SCALES - GENERAL
PAINLEVEL_OUTOF10: 3
PAINLEVEL_OUTOF10: 0
PAINLEVEL_OUTOF10: 5
PAINLEVEL_OUTOF10: 3

## 2021-10-15 ASSESSMENT — PAIN DESCRIPTION - FREQUENCY: FREQUENCY: CONTINUOUS

## 2021-10-15 ASSESSMENT — PAIN DESCRIPTION - PAIN TYPE: TYPE: SURGICAL PAIN

## 2021-10-15 ASSESSMENT — PAIN DESCRIPTION - LOCATION: LOCATION: NECK

## 2021-10-15 ASSESSMENT — PAIN DESCRIPTION - DESCRIPTORS: DESCRIPTORS: ACHING

## 2021-10-15 ASSESSMENT — PAIN DESCRIPTION - ONSET: ONSET: ON-GOING

## 2021-10-15 ASSESSMENT — PAIN DESCRIPTION - ORIENTATION: ORIENTATION: POSTERIOR

## 2021-10-15 NOTE — ANESTHESIA PRE PROCEDURE
Department of Anesthesiology  Preprocedure Note       Name:  Stewart Marie   Age:  77 y.o.  :  1955                                          MRN:  [id number]         Date:  10/15/2021      Surgeon: Arnoldo Zapata): Pat Obando MD    Procedure: Procedure(s):  ANTERIOR CERVICAL DISCECTOMY AND FUSION C3-4/ POSSIBLE DECOMPRESSION C3-4 POSTERIOR/ REMOVAL OF PLATES AND LATERAL MASS SCREW    Medications prior to admission:   Prior to Admission medications    Medication Sig Start Date End Date Taking?  Authorizing Provider   vitamin B-12 500 MCG tablet Take 1 tablet by mouth daily 10/16/21  Yes Yolanda Tao MD   ibuprofen (ADVIL;MOTRIN) 200 MG tablet Take 200 mg by mouth every 6 hours as needed for Pain   Yes Historical Provider, MD       Current medications:    Current Facility-Administered Medications   Medication Dose Route Frequency Provider Last Rate Last Admin    0.9 % sodium chloride infusion   IntraVENous Continuous YOKASTA Szymanski - NP 75 mL/hr at 10/15/21 0009 New Bag at 10/15/21 0009    vitamin B-12 (CYANOCOBALAMIN) tablet 500 mcg  500 mcg Oral Daily Yolanda Tao MD   500 mcg at 10/15/21 0919    sodium chloride flush 0.9 % injection 10 mL  10 mL IntraVENous 2 times per day Alondra Yuan MD   10 mL at 10/14/21 2255    sodium chloride flush 0.9 % injection 10 mL  10 mL IntraVENous PRN Alondra Yuan MD        0.9 % sodium chloride infusion  25 mL IntraVENous PRN Alondra Yuan MD        ondansetron (ZOFRAN-ODT) disintegrating tablet 4 mg  4 mg Oral Q8H PRN Alondra Yuan MD        Or    ondansetron [facility]) injection 4 mg  4 mg IntraVENous Q6H PRN Alondra Yuan MD        sennosides-docusate sodium (SENOKOT-S) 8.6-50 MG tablet 1 tablet  1 tablet Oral BID Alondra Yuan MD   1 tablet at 10/15/21 0919    magnesium hydroxide (MILK OF MAGNESIA) 400 MG/5ML suspension 30 mL  30 mL Oral Daily PRN Alondra Yuan MD        famotidine (PEPCID) tablet 20 mg  20 mg Oral BID Alondra Yuan MD  acetaminophen (TYLENOL) tablet 650 mg  650 mg Oral Q6H PRN Raquel Suárez MD   650 mg at 10/15/21 0919    Or    acetaminophen (TYLENOL) suppository 650 mg  650 mg Rectal Q6H PRN Raquel Suárez MD           Allergies:  No Known Allergies    Problem List:    Patient Active Problem List   Diagnosis Code    Bronchitis, acute J20.9    Abdominal pain, RUQ R10.11    Generalized weakness R53.1    Cervical myelopathy (HCC) G95.9       Past Medical History:        Diagnosis Date    BPH (benign prostatic hyperplasia)        Past Surgical History:        Procedure Laterality Date    CERVICAL FUSION  2006    CHOLECYSTECTOMY, LAPAROSCOPIC  2009    LEG SURGERY  2000    benign tumor; partial gastronemeus resection       Social History:    Social History     Tobacco Use    Smoking status: Never Smoker    Smokeless tobacco: Never Used   Substance Use Topics    Alcohol use: No                                Counseling given: Not Answered      Vital Signs (Current):   Vitals:    10/14/21 2245 10/15/21 0500 10/15/21 0916 10/15/21 1149   BP: 115/85 (!) 145/94 (!) 130/92 122/88   Pulse: 97 94  100   Resp: 18 18 18 16   Temp: 98.4 °F (36.9 °C) 98.8 °F (37.1 °C) 97.7 °F (36.5 °C) 98.7 °F (37.1 °C)   TempSrc: Oral Oral Oral Oral   SpO2: 97% 95%  96%   Weight:       Height:                                                  BP Readings from Last 3 Encounters:   10/15/21 122/88   10/15/21 101/60   10/07/21 136/88       NPO Status:                                                                                 BMI:   Wt Readings from Last 3 Encounters:   10/14/21 180 lb 1.6 oz (81.7 kg)   10/07/21 190 lb (86.2 kg)   08/16/21 194 lb (88 kg)     Body mass index is 28.21 kg/m².     CBC:   Lab Results   Component Value Date    WBC 13.1 10/15/2021    RBC 4.80 10/15/2021    HGB 14.5 10/15/2021    HCT 42.9 10/15/2021    MCV 89.4 10/15/2021    RDW 13.7 10/15/2021     10/15/2021       CMP:   Lab Results   Component Value Date  10/15/2021    K 4.4 10/15/2021     10/15/2021    CO2 24 10/15/2021    BUN 28 10/15/2021    CREATININE 1.1 10/15/2021    GFRAA >60 10/15/2021    LABGLOM >60 10/15/2021    GLUCOSE 120 10/15/2021    PROT 7.3 10/12/2021    CALCIUM 8.5 10/15/2021    BILITOT 0.5 10/12/2021    ALKPHOS 81 10/12/2021    AST 41 10/12/2021     10/12/2021       POC Tests: No results for input(s): POCGLU, POCNA, POCK, POCCL, POCBUN, POCHEMO, POCHCT in the last 72 hours. Coags:   Lab Results   Component Value Date    PROTIME 11.4 10/15/2021    INR 1.01 10/15/2021       HCG (If Applicable): No results found for: PREGTESTUR, PREGSERUM, HCG, HCGQUANT     ABGs: No results found for: PHART, PO2ART, NZK9OAE, JMW8MOP, BEART, F8NLNHVI     Type & Screen (If Applicable):  No results found for: LABABO, LABRH    Drug/Infectious Status (If Applicable):  No results found for: HIV, HEPCAB    COVID-19 Screening (If Applicable):   Lab Results   Component Value Date    COVID19 Not Detected 10/12/2021           Anesthesia Evaluation  Patient summary reviewed and Nursing notes reviewed  Airway: Mallampati: II  TM distance: >3 FB   Neck ROM: full  Mouth opening: > = 3 FB Dental: normal exam         Pulmonary:Negative Pulmonary ROS and normal exam                               Cardiovascular:Negative CV ROS  Exercise tolerance: good (>4 METS),                     Neuro/Psych:   Negative Neuro/Psych ROS              GI/Hepatic/Renal: Neg GI/Hepatic/Renal ROS            Endo/Other: Negative Endo/Other ROS   (+) : arthritis: OA., .                 Abdominal:             Vascular: negative vascular ROS. Other Findings:             Anesthesia Plan      general     ASA 1       Induction: intravenous. MIPS: Postoperative opioids intended and Prophylactic antiemetics administered. Anesthetic plan and risks discussed with patient. Use of blood products discussed with patient whom consented to blood products.    Plan discussed with CRNA.    Attending anesthesiologist reviewed and agrees with Des Johnson MD   10/15/2021

## 2021-10-15 NOTE — CONSULTS
Orthopaedics Consult Note        Reason for Consult:  Left knee effusion       CHIEF COMPLAINT:  Left Knee Effusion      HISTORY OF PRESENT ILLNESS:                The patient is a 77 y.o. male with PMHx of  fatty tumor removal from leg and previous C4-7 ACDF by Dr. Shadia Lindsey in 2004 who presented on 10/13/2021 with BUE and BLE weakness/sensory change and gait disturbance. Patient reports he had close exposure to someone with Covid-19 on 9/29/21 but has had two negative test. He is vaccinated. When his symptoms persisted he saw his PCP who prescribed him prednisone to help with his body aches. He reports the \"chills\" feeling subsided and his pain became less severe but he just did not feel quite right. He began noticing feeing increased weakness in all of his extremities (worst in left hand), and decreased sensation in all extremities on 10/09/21. He also reports a fall at home while in his yard because his left leg buckled under him. He denies any trauma to the left knee. He denies any worsening neck pain, he denies radicular pain in shoulders or upper extremities. However, he notes a burning sensation in the back of his neck when rotating his head which is new. He denies pain radiating down his legs. He has generalized decreased sensation in both upper extremities. He had a similar decrease in sensation throughout his left calf but that has resolved. His wife notes gait disturbance stating that he walks like he is drunk. He denies bowel/bladder compromise. A head CT did not reveal any acute intracranial abnormalities. MRI cervical spine demonstrates multilevel spondylosis, worse at C3-C4 where there is severe bilateral neural foraminal stenosis. Upon admission for further workup and treatment of his prior diagnosis he was found to have a left knee effusion. Orthopedics were consulted for assessment. He once again notes that his knee did buckle but denies any trauma.  He attests that the effusion has increased over the past 24 hours. He denies any increased knee pain but notes increased stiffness. He denies any fevers, recent chills, dysuria, erythema, ecchymosis,  tenderness with micro movements, or gross knee instability. He is being treated outpatient by Dr. Caryle Benders for patellofemoral OA. He had a left knee aspiration and cortisone injection on 06/28/21 with good relief.      Past Medical History:    Past Medical History:   Diagnosis Date    BPH (benign prostatic hyperplasia)        Past Surgical History:    Past Surgical History:   Procedure Laterality Date    CERVICAL FUSION  2006    CHOLECYSTECTOMY, LAPAROSCOPIC  2009    LEG SURGERY  2000    benign tumor; partial gastronemeus resection       Current Medications:   Current Facility-Administered Medications   Medication Dose Route Frequency Provider Last Rate Last Admin    0.9 % sodium chloride infusion   IntraVENous Continuous YOKASTA Dueñas - NP 75 mL/hr at 10/15/21 0009 New Bag at 10/15/21 0009    vitamin B-12 (CYANOCOBALAMIN) tablet 500 mcg  500 mcg Oral Daily Gela Rubalcava MD   500 mcg at 10/15/21 0919    sodium chloride flush 0.9 % injection 10 mL  10 mL IntraVENous 2 times per day Franchesca Jordan MD   10 mL at 10/14/21 2255    sodium chloride flush 0.9 % injection 10 mL  10 mL IntraVENous PRN Franchesca Jordan MD        0.9 % sodium chloride infusion  25 mL IntraVENous PRN Franchesca Jordan MD        ondansetron (ZOFRAN-ODT) disintegrating tablet 4 mg  4 mg Oral Q8H PRN Franchesca Jordan MD        Or    ondansetron [facility]) injection 4 mg  4 mg IntraVENous Q6H PRN Franchesca Jordan MD        sennosides-docusate sodium (SENOKOT-S) 8.6-50 MG tablet 1 tablet  1 tablet Oral BID Franchesca Jordan MD   1 tablet at 10/15/21 0919    magnesium hydroxide (MILK OF MAGNESIA) 400 MG/5ML suspension 30 mL  30 mL Oral Daily PRN Franchesca Jordan MD        famotidine (PEPCID) tablet 20 mg  20 mg Oral BID Franchesca Jordan MD        acetaminophen (TYLENOL) tablet 650 mg  650 mg Oral Q6H PRN Jaylon Rutherford MD   650 mg at 10/15/21 0919    Or    acetaminophen (TYLENOL) suppository 650 mg  650 mg Rectal Q6H PRN Jaylon Rutherford MD           Allergies:    Patient has no known allergies. 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    3 kids--new marraige     Social Determinants of Health     Financial Resource Strain:     Difficulty of Paying Living Expenses:    Food Insecurity:     Worried About Running Out of Food in the Last Year:     [address] in the Last Year:    Transportation Needs:     Lack of Transportation (Medical):  Lack of Transportation (Non-Medical):    Physical Activity:     Days of Exercise per Week:     Minutes of Exercise per Session:    Stress:     Feeling of Stress :    Social Connections:     Frequency of Communication with Friends and Family:     Frequency of Social Gatherings with Friends and Family:     Attends Hindu Services:     Active Member of Clubs or Organizations:     Attends Club or Organization Meetings:     Marital Status:    Intimate Partner Violence:     Fear of Current or Ex-Partner:     Emotionally Abused:     Physically Abused:     Sexually Abused:        Family History:   History reviewed. No pertinent family history. PHYSICAL EXAM:    Blood pressure 122/88, pulse 100, temperature 98.7 °F (37.1 °C), temperature source Oral, resp.  rate 16, height 5' 7\" (1.702 m), weight 180 lb 1.6 oz (81.7 kg), SpO2 96 %.  General: Patient is well appearing and cheerful, handle to answer questions appropriately  HEENT: normocephalic, atraumatic   Upper Extremity: Full ROM with some tenderness around IV. No erythema    Lower extremity: Moderate left knee effusion. No erythema, ecchymosis, or signs of infection. No tenderness with micro movements. ROM -5 - 90 degrees with a sensation of increased joint pressure at end ranges. No ligamentous instability. Discomfort with patellar compression. Appropriate patellar mobility. Calf soft and non-tender. Neuro: A&O x4, no numbness or paresthesias  Vascular: Dorsal pedal pulses 2 bilaterally, Cap refill on fingers and toes 2 sec. Imaging:  XR CERVICAL SPINE (4-5 VIEWS)   Final Result   Impression: Grade 1 retrolisthesis of C3 on C4 which is mildly accentuated upon extension positioning. CT ABDOMEN PELVIS W IV CONTRAST Additional Contrast? None   Final Result   1. Unremarkable CT appearance of the pancreas. 2. Indeterminate right renal lesion. MRI of the abdomen without and with contrast is recommended for further characterization. 3. Bilateral perinephric infiltration is nonspecific. No obstructing calculi or hydronephrosis. CT CERVICAL SPINE WO CONTRAST   Final Result   1. Imaging obtained for preoperative planning demonstrating previous anterior cervical fusion from C4 to C7 with mature osseous bridging of the C4-C5 and C5-C6 disc spaces. 2. Moderate disc osteophyte complex at C3-C4 with diffuse disc bulge contributing to central canal stenosis. There appears to be severe canal stenosis corresponding to recent MRI findings. 3. Incomplete osseous bridging of the C6-C7 disc space. 4. No evidence of hardware failure or loosening. MRI CERVICAL SPINE WO CONTRAST   Final Result   1. Postsurgical changes related to ACDF as detailed above. 2. Multilevel spondylosis is present, worse at C3-C4 where there is severe bilateral neural foraminal stenosis.       XR

## 2021-10-15 NOTE — PLAN OF CARE
Problem: Falls - Risk of:  Goal: Will remain free from falls  Description: Will remain free from falls  10/15/2021 0103 by Meseret Quintanilla RN  Outcome: Ongoing  Patient remains free from falls during this shift. Patient is up x1 person assist with walker. Bed is in the lowest position and the bed alarm is activated. Anti-slip socks are on. Call light is within reach. Will continue to monitor and reassess. Problem: Sensory:  Goal: Pain level will decrease  Description: Pain level will decrease  Outcome: Ongoing   Patient denies any pain. Patient has numbness to all extremities. Will continue to monitor and reassess.

## 2021-10-15 NOTE — PROGRESS NOTES
Patient admitted to PACU # 15 from OR at 46 post ANTERIOR CERVICAL DISCECTOMY AND FUSION C3-4 AND C6-7, DECOMPRESSION AND FUSION C3-4 POSTERIOR/ REMOVAL OF PLATES AND LATERAL MASS SCREW   per Dr. Lynne Vasquez. Attached to PACU monitoring system and report received from anesthesia provider. Patient was reported to need BP support during procedure. Pt arrived to PACU with oral airway in place. Pt with cervical collar in place. Anterior surgical incision c/d/i with surgical glue. IVF infusing. Will continue to monitor.

## 2021-10-15 NOTE — PROGRESS NOTES
Occupational Therapy  No Treatment / Sign-off    Chart reviewed. Per RN, pt will be going down to OR for surgery this afternoon. Therapy will sign off at this time, will await new orders post-op.      [address] Nguyen, OTR/REGINA Hart, [address] [place] [address]

## 2021-10-15 NOTE — PROGRESS NOTES
Patient admitted transferred to room 5517. Patient is alert and oriented. Vital signs are stable. Patient took shower with Hibiclens. Bed is in the lowest position. Bed alarm is activated. Call light is within reach. Will continue to monitor and reassess.

## 2021-10-15 NOTE — PROGRESS NOTES
4 Eyes Admission Assessment     I agree as the admission nurse that 2 RN's have performed a thorough Head to Toe Skin Assessment on the patient. ALL assessment sites listed below have been assessed on admission. Areas assessed by both nurses:   [x]   Head, Face, and Ears   [x]   Shoulders, Back, and Chest  [x]   Arms, Elbows, and Hands   [x]   Coccyx, Sacrum, and Ischium  [x]   Legs, Feet, and Heels        Does the Patient have Skin Breakdown?   No         Adrian Prevention initiated:  No   Wound Care Orders initiated:  No      [facility] nurse consulted for Pressure Injury (Stage 3,4, Unstageable, DTI, NWPT, and Complex wounds) or Adrian score 18 or lower:  No      Nurse 1 eSignature: Electronically signed by Gerhardt Hones, RN on 10/15/21 at 12:32 AM EDT    **SHARE this note so that the co-signing nurse is able to place an eSignature**    Nurse 2 eSignature: Electronically signed by Nathalia Winston RN on 10/15/21 at 6:04 AM EDT

## 2021-10-15 NOTE — PROGRESS NOTES
lipase:  Etiology is not clear. CT A/P non acute. Will refer to GI as outpatient. Right kidney cyst:  Incidental finding on CT. Discussed with patient and family- will refer for outpatient MRI. Left knee swelling:  Consult orthopedic surgery    Systemic leukocytosis:  Without fever, likely due to recent exposure to steroids, monitor for signs and symptoms of infection  UA is clear. COVID negative.      BPH:  Urinating well, continue to monitor for urinary retention        DVT Prophylaxis: Inpatient  Diet: Diet NPO  Code Status: Full Code    PT/OT Eval Status: For home care    [facility]    Thomas Bryant MD

## 2021-10-15 NOTE — PROGRESS NOTES
NEUROSURGERY PROGRESS NOTE    Vinay Bryan   [id number]   1955   10/15/2021    Hospital Day #3    Subjective: patient reports left knee pain/swelling overnight- this is an ongoing issue for him. Upper extremity weakness/numbnes unchanged. Objective:  BP (!) 130/92   Pulse 94   Temp 97.7 °F (36.5 °C) (Oral)   Resp 18   Ht 5' 7\" (1.702 m)   Wt 180 lb 1.6 oz (81.7 kg)   SpO2 95%   BMI 28.21 kg/m²     Labs:  Recent Labs     10/13/21  0620 10/14/21  0649 10/15/21  0557    141 137    104 101   CO2 29 28 24   BUN 24* 27* 28*   CREATININE 1.2 1.1 1.1   GLUCOSE 104* 105* 120*     Recent Labs     10/13/21  0620 10/13/21  1932 10/14/21  0649 10/15/21  0558   WBC 11.3*  --  11.8* 13.1*   RBC 4.77  --  4.84 4.80   INR  --  0.96  --  1.01     XR CERVICAL SPINE (4-5 VIEWS)   Final Result   Impression: Grade 1 retrolisthesis of C3 on C4 which is mildly accentuated upon extension positioning. MRI CERVICAL SPINE WO CONTRAST   Final Result   1. Postsurgical changes related to ACDF as detailed above. 2. Multilevel spondylosis is present, worse at C3-C4 where there is severe bilateral neural foraminal stenosis. XR CHEST 1 VIEW   Final Result   Impression: No acute abnormality. CT Head WO Contrast   Final Result      No acute intracranial hemorrhage or signs of mass effect.       CT CERVICAL SPINE WO CONTRAST    (Results Pending)         Neurologic Exam:  GCS:  4 - Opens eyes on own  5 - Alert and oriented  6 - Follows simple motor commands    Mental Status: Awake, alert, oriented x 4, speech clear and appropriate  Sensation: Intact to all extremities to light touch  Coordination: Intact    DTRs:    Right  Left    ankle clonus  - +   hoffmans - +       Musculoskeletal:   Gait: Not tested   Tone: normal   Motor strength:    Right  Left    Right  Left    Deltoid  5 4+  Hip Flex  5 4   Biceps  5 4+  Knee Extensors  5 4   Triceps  5 4+  Knee Flexors  5 4   Wrist Ext  5 4-  Ankle Dorsiflex. 5 5   Wrist Flex  5 4-  Ankle Plantarflex. 5 5   Handgrip  5 4-  Ext Edmar Longus  5 5   Thumb Ext  5 4-           Assessment   71 yo male w/ surgical history of C4-7 ACDF who presents with gait disturbances, extremity numbness and weakness. He is myelopathic on assessment. MRI cervical spine demonstrates multilevel spondylosis, worst at C3-C4 where there is severe bilateral neural foraminal stenosis. Plan:  No emergent neurosurgical intervention, however given patient's myelopathy and cervical spondylosis he will further cervical decompression this admission. Plan for ACDF C3-4, possible revise C6-7 with anterior plate A8-4. Probable posterior decompression C3-4 today by Dr. Shadia Lindsey     2. q 4 hour neuro checks  3. [place] J collar at all times- patient may remove for eating/bathing   4. [id number] [address] Go for DVT prophylaxis now, will hold this evening for surgery   5. Pain control per primary team  6. Primary attending for medical clearance   8. NPO, treatment consent, pre op labs, ancef on call, Hibiclens shower prior to OR  9. Will follow closely. Please call with questions/change in neurologic exam    DISPO-inpatient, OR today     YOKASTA Saleh-CNP  Neurosurgery Nurse Practitioner  [phone]    Patient was discussed with Dr. Shadia Lindsey who agrees with above assessment and plan. Electronically signed by:  YOKASTA Cerna NP, 10/15/2021 9:27 AM

## 2021-10-15 NOTE — BRIEF OP NOTE
Brief Postoperative Note      Patient: Sushil Bacon  YOB: 1955  MRN: [id number]    Date of Procedure: 10/15/2021    Pre-Op Diagnosis: CERVICAL HERNIATION    Post-Op Diagnosis: Same       Procedure(s):  ANTERIOR CERVICAL DISCECTOMY AND FUSION C3-4 AND C6-7, POSSIBLE DECOMPRESSION C3-4 POSTERIOR/ REMOVAL OF PLATES AND LATERAL MASS SCREW    Surgeon(s):   Harini Norman MD    Assistant:  Physician Assistant: SIRI Mcpherson    Anesthesia: General    Estimated Blood Loss (mL): 732     Complications: None    Specimens:   * No specimens in log *    Implants:  * No implants in log *      Drains:   Urethral Catheter Non-latex;Straight-tip 16 fr (Active)       Findings: cord compression    Electronically signed by Harini Norman MD on 10/15/2021 at 6:36 PM

## 2021-10-16 LAB
ANION GAP SERPL CALCULATED.3IONS-SCNC: 11 MMOL/L (ref 3–16)
BUN BLDV-MCNC: 25 MG/DL (ref 7–20)
CALCIUM SERPL-MCNC: 8.5 MG/DL (ref 8.3–10.6)
CHLORIDE BLD-SCNC: 101 MMOL/L (ref 99–110)
CO2: 23 MMOL/L (ref 21–32)
CREAT SERPL-MCNC: 1 MG/DL (ref 0.8–1.3)
GFR AFRICAN AMERICAN: >60
GFR NON-AFRICAN AMERICAN: >60
GLUCOSE BLD-MCNC: 149 MG/DL (ref 70–99)
HCT VFR BLD CALC: 38.3 % (ref 40.5–52.5)
HEMOGLOBIN: 12.6 G/DL (ref 13.5–17.5)
MCH RBC QN AUTO: 29.5 PG (ref 26–34)
MCHC RBC AUTO-ENTMCNC: 33 G/DL (ref 31–36)
MCV RBC AUTO: 89.4 FL (ref 80–100)
PDW BLD-RTO: 13.8 % (ref 12.4–15.4)
PLATELET # BLD: 361 K/UL (ref 135–450)
PMV BLD AUTO: 7.7 FL (ref 5–10.5)
POTASSIUM REFLEX MAGNESIUM: 4.4 MMOL/L (ref 3.5–5.1)
RBC # BLD: 4.29 M/UL (ref 4.2–5.9)
SODIUM BLD-SCNC: 135 MMOL/L (ref 136–145)
WBC # BLD: 17.7 K/UL (ref 4–11)

## 2021-10-16 PROCEDURE — 6370000000 HC RX 637 (ALT 250 FOR IP): Performed by: PHYSICIAN ASSISTANT

## 2021-10-16 PROCEDURE — 0RG1071 FUSION OF CERVICAL VERTEBRAL JOINT WITH AUTOLOGOUS TISSUE SUBSTITUTE, POSTERIOR APPROACH, POSTERIOR COLUMN, OPEN APPROACH: ICD-10-PCS | Performed by: NEUROLOGICAL SURGERY

## 2021-10-16 PROCEDURE — 80048 BASIC METABOLIC PNL TOTAL CA: CPT

## 2021-10-16 PROCEDURE — 0PP304Z REMOVAL OF INTERNAL FIXATION DEVICE FROM CERVICAL VERTEBRA, OPEN APPROACH: ICD-10-PCS | Performed by: NEUROLOGICAL SURGERY

## 2021-10-16 PROCEDURE — 6360000002 HC RX W HCPCS: Performed by: NURSE PRACTITIONER

## 2021-10-16 PROCEDURE — 85027 COMPLETE CBC AUTOMATED: CPT

## 2021-10-16 PROCEDURE — 2580000003 HC RX 258: Performed by: PHYSICIAN ASSISTANT

## 2021-10-16 PROCEDURE — 2580000003 HC RX 258: Performed by: NURSE PRACTITIONER

## 2021-10-16 PROCEDURE — 00NW0ZZ RELEASE CERVICAL SPINAL CORD, OPEN APPROACH: ICD-10-PCS | Performed by: NEUROLOGICAL SURGERY

## 2021-10-16 PROCEDURE — 36415 COLL VENOUS BLD VENIPUNCTURE: CPT

## 2021-10-16 PROCEDURE — 1200000000 HC SEMI PRIVATE

## 2021-10-16 PROCEDURE — 0RB30ZZ EXCISION OF CERVICAL VERTEBRAL DISC, OPEN APPROACH: ICD-10-PCS | Performed by: NEUROLOGICAL SURGERY

## 2021-10-16 PROCEDURE — 0RG20A0 FUSION OF 2 OR MORE CERVICAL VERTEBRAL JOINTS WITH INTERBODY FUSION DEVICE, ANTERIOR APPROACH, ANTERIOR COLUMN, OPEN APPROACH: ICD-10-PCS | Performed by: NEUROLOGICAL SURGERY

## 2021-10-16 PROCEDURE — 6360000002 HC RX W HCPCS: Performed by: PHYSICIAN ASSISTANT

## 2021-10-16 RX ADMIN — CYANOCOBALAMIN TAB 1000 MCG 500 MCG: 1000 TAB at 09:47

## 2021-10-16 RX ADMIN — METHOCARBAMOL 1000 MG: 100 INJECTION, SOLUTION INTRAMUSCULAR; INTRAVENOUS at 03:43

## 2021-10-16 RX ADMIN — Medication 10 ML: at 09:48

## 2021-10-16 RX ADMIN — DOCUSATE SODIUM 50 MG AND SENNOSIDES 8.6 MG 1 TABLET: 8.6; 5 TABLET, FILM COATED ORAL at 09:48

## 2021-10-16 RX ADMIN — METHOCARBAMOL 1000 MG: 100 INJECTION, SOLUTION INTRAMUSCULAR; INTRAVENOUS at 12:07

## 2021-10-16 RX ADMIN — OXYCODONE 10 MG: 5 TABLET ORAL at 05:57

## 2021-10-16 RX ADMIN — METHOCARBAMOL 1000 MG: 100 INJECTION, SOLUTION INTRAMUSCULAR; INTRAVENOUS at 20:20

## 2021-10-16 RX ADMIN — ACETAMINOPHEN 650 MG: 325 TABLET ORAL at 14:13

## 2021-10-16 RX ADMIN — DOCUSATE SODIUM 50 MG AND SENNOSIDES 8.6 MG 1 TABLET: 8.6; 5 TABLET, FILM COATED ORAL at 20:02

## 2021-10-16 RX ADMIN — CEFAZOLIN 2000 MG: 10 INJECTION, POWDER, FOR SOLUTION INTRAVENOUS at 00:40

## 2021-10-16 RX ADMIN — Medication 10 ML: at 20:05

## 2021-10-16 RX ADMIN — OXYCODONE 10 MG: 5 TABLET ORAL at 00:43

## 2021-10-16 RX ADMIN — SODIUM CHLORIDE 25 ML: 9 INJECTION, SOLUTION INTRAVENOUS at 20:19

## 2021-10-16 ASSESSMENT — PAIN DESCRIPTION - LOCATION
LOCATION: NECK

## 2021-10-16 ASSESSMENT — PAIN DESCRIPTION - ORIENTATION
ORIENTATION: POSTERIOR

## 2021-10-16 ASSESSMENT — PAIN DESCRIPTION - ONSET
ONSET: ON-GOING
ONSET: ON-GOING

## 2021-10-16 ASSESSMENT — PAIN DESCRIPTION - DESCRIPTORS
DESCRIPTORS: ACHING
DESCRIPTORS: ACHING

## 2021-10-16 ASSESSMENT — PAIN - FUNCTIONAL ASSESSMENT
PAIN_FUNCTIONAL_ASSESSMENT: PREVENTS OR INTERFERES SOME ACTIVE ACTIVITIES AND ADLS
PAIN_FUNCTIONAL_ASSESSMENT: PREVENTS OR INTERFERES SOME ACTIVE ACTIVITIES AND ADLS

## 2021-10-16 ASSESSMENT — PAIN SCALES - GENERAL
PAINLEVEL_OUTOF10: 0
PAINLEVEL_OUTOF10: 7
PAINLEVEL_OUTOF10: 2
PAINLEVEL_OUTOF10: 7
PAINLEVEL_OUTOF10: 0

## 2021-10-16 ASSESSMENT — PAIN DESCRIPTION - PROGRESSION
CLINICAL_PROGRESSION: NOT CHANGED
CLINICAL_PROGRESSION: NOT CHANGED

## 2021-10-16 ASSESSMENT — PAIN DESCRIPTION - PAIN TYPE
TYPE: SURGICAL PAIN

## 2021-10-16 ASSESSMENT — PAIN DESCRIPTION - FREQUENCY
FREQUENCY: CONTINUOUS
FREQUENCY: CONTINUOUS

## 2021-10-16 NOTE — PLAN OF CARE
Problem: Falls - Risk of:  Goal: Will remain free from falls  Description: Will remain free from falls  10/16/2021 1248 by Angelia Baker RN  Outcome: Ongoing  Pt is a high fall risk. High fall precautions are in place: nonskid socks, yellow blanket, fall wristband, call light in reach, bed is locked and in lowest position, bed alarm engaged. Problem: Skin Integrity:  Goal: Risk for impaired skin integrity will decrease  Description: Risk for impaired skin integrity will decrease  Outcome: Ongoing   Turn and reposition q 2 hrs.

## 2021-10-16 NOTE — PROGRESS NOTES
Hospitalist Progress Note      PCP: Carmen Amato MD    Date of Admission: 10/12/2021    Chief Complaint on Admission: weakness, numbness    Pt Seen/Examined and Chart Reviewed. Admitting dx as above    SUBJECTIVE/OBJECTIVE:     Patient admitted from home with progressive weakness and numbness. H/o previous cervical surgery. MRI showed cord compression with myelopathy. Status post anterior cervical discectomy and fusion C3-4 and C6-7 on 10/15. Today patient reports improvement in his strength, but continues to have numbness. Urinating well  Not passing gas yet. Feels like he is ready to eat breakfast.  Discussed with neurosurgical team-patient will remain in observation at the hospital until Monday. Allergies  Patient has no known allergies. Medications      Scheduled Meds:   methocarbamol IVPB  1,000 mg IntraVENous Q8H    vitamin B-12  500 mcg Oral Daily    sodium chloride flush  10 mL IntraVENous 2 times per day    sennosides-docusate sodium  1 tablet Oral BID    famotidine  20 mg Oral BID       Infusions:   sodium chloride         PRN Meds:  oxyCODONE, sodium chloride flush, sodium chloride, ondansetron **OR** ondansetron, magnesium hydroxide, acetaminophen **OR** acetaminophen    Vitals    TEMPERATURE:  Current - Temp: 98.1 °F (36.7 °C);  Max - Temp  Av.6 °F (37 °C)  Min: 97.5 °F (36.4 °C)  Max: 98.8 °F (37.1 °C)  RESPIRATIONS RANGE: Resp  Avg: 10.7  Min: 0  Max: 21  PULSE RANGE: Pulse  Av.1  Min: 65  Max: 100  BLOOD PRESSURE RANGE:  Systolic (45UZU), QPP:681 , Min:74 , CSP:888   ; Diastolic (16HFR), QMP:17, Min:53, Max:94    PULSE OXIMETRY RANGE: SpO2  Av.2 %  Min: 89 %  Max: 100 %  24HR INTAKE/OUTPUT:      Intake/Output Summary (Last 24 hours) at 10/16/2021 1656  Last data filed at 10/16/2021 1313  Gross per 24 hour   Intake 2562.73 ml   Output 701 ml   Net 1861.73 ml       Exam:      General appearance: No apparent distress, appears stated age and 10/15  Continue with pain control, PT OT as per neurosurgical team  Patient to wear [place] J collar when out of bed, does not have to wear while asleep or resting in bed. Elevated lipase:  Etiology is not clear. CT A/P non acute. Will refer to GI as outpatient. Right kidney cyst:  Incidental finding on CT. Discussed with patient and family- will refer for outpatient MRI. Left knee swelling:  Consulted orthopedic surgery, patient underwent left knee aspiration at bedside, fluid sent for analysis, so far WBC is in inflammatory range, Gram stain negative, will follow up culture. Systemic leukocytosis:  Without fever, likely due to recent exposure to steroids, monitor for signs and symptoms of infection  UA is clear. COVID negative. Follow-up results of aspirate. BPH:  Urinating well, continue to monitor for urinary retention        DVT Prophylaxis: Inpatient  Diet: ADULT DIET;  Regular  Code Status: Full Code    PT/OT Eval Status: Pending    Dispo -inpatient    Joshua Skaggs MD

## 2021-10-16 NOTE — PROGRESS NOTES
4 Eyes Admission Assessment     I agree as the admission nurse that 2 RN's have performed a thorough Head to Toe Skin Assessment on the patient. ALL assessment sites listed below have been assessed on admission. Areas assessed by both nurses:   [x]   Head, Face, and Ears   [x]   Shoulders, Back, and Chest  [x]   Arms, Elbows, and Hands   [x]   Coccyx, Sacrum, and Ischium  [x]   Legs, Feet, and Heels        Does the Patient have Skin Breakdown?   No         Adrian Prevention initiated:  No   Wound Care Orders initiated:  No      [facility] nurse consulted for Pressure Injury (Stage 3,4, Unstageable, DTI, NWPT, and Complex wounds) or Adrian score 18 or lower:  No      Nurse 1 eSignature: Electronically signed by Keiko Christian RN on 10/15/21 at 9:01 PM EDT    **SHARE this note so that the co-signing nurse is able to place an eSignature**    Nurse 2 eSignature: Electronically signed by Brent Frazier RN on 10/15/21 at 9:02 PM EDT

## 2021-10-16 NOTE — PLAN OF CARE
Problem: Falls - Risk of:  Goal: Will remain free from falls  Description: Will remain free from falls  Outcome: Ongoing  Note: Pt will remain free of falls for the duration of the shift. Pt is A/O x 4 and uses the call light appropriately for needs. Pt has yet to ambulate post op. Pt very drowsy. Will work on progressive ambulation. Bed alarm on, wheels locked, bed in lowest position, side rails up 2/4, nonskid socks on, call light and bedside table in reach. Will continue to monitor. Problem: Fluid Volume:  Goal: Maintenance of adequate hydration will improve  Description: Maintenance of adequate hydration will improve  Outcome: Ongoing  Note: Pt on IVF. VSS.  Yet to void     Problem: Sensory:  Goal: Pain level will decrease  Description: Pain level will decrease  Outcome: Ongoing  Note: Pt taking PO pain medication for neck pain

## 2021-10-16 NOTE — OP NOTE
[address]                [address]                                OPERATIVE REPORT    PATIENT NAME: Briana Mendoza                 :        1955  MED REC NO:   [id number]                          ROOM:       [id number]  ACCOUNT NO:   [de-identified]                           ADMIT DATE: 10/12/2021  PROVIDER:     Al Rothman MD    DATE OF PROCEDURE:  10/15/2021    PREOPERATIVE DIAGNOSES:  Cervical stenosis with cervical cord  compression and cervical myelopathy. POSTOPERATIVE DIAGNOSES:  Cervical stenosis with cervical cord  compression and cervical myelopathy. OPERATIONS:  1. Removal of cervical plate from C4 to C7, anterior cervical  diskectomy at C3-4, decompression of the cord with microdissection,  bilateral foraminotomies, placement of a structural PEEK cage packed  with allograft, placement of a titanium cervical plate at D8-7.  2.  Redrill out of pseudoarthrosis at C6-7, total diskectomy, and  interbody fusion with the structural cage packed with allograft and the  cervical plate. 3.  Posterior cervical decompression at C3-4, total laminectomy with  placement of lateral mass screws at C3-4 and posterior lateral facet  fusion with allograft and autograft. 4.  Microdissection. SURGEON:  Al Rothman MD    ASSISTANT:  SIRI Boyle required for assistance with instrumentation  and retraction. ANESTHESIA:  General.    ESTIMATED BLOOD LOSS:  200 mL. COMPLICATIONS:  None. HISTORY:  The patient is a 61-year-old man who in  underwent a  cervical diskectomy at C3-4, C4-5, C5-6, and C6-7 with anterior fusion.    The patient has done very well with minimal neck symptoms until the last  few months, the patient has developed some increasing neck pain,  weakness, and numbness in the upper extremities followed by the recent  onset of clumsiness with gait and more weakness of his left upper  extremity particularly. He was seen in the emergency room, admitted  with an MRI scan that showed a herniated cervical disk with severe  spinal stenosis, cord compression, and T2 signal abnormality in the  spinal cord. A CT scan showed a radiographic pseudoarthrosis at C6-7. Recommendation for the above-mentioned surgical procedure was made. Full informed consent was provided. The patient agreed and wished to  proceed with operation. DESCRIPTION OF PROCEDURE:  The patient was taken to Surgery and placed  under general endotracheal anesthesia. He was placed in a supine  position with a roll under the shoulders and neck extended. Intraoperative evoked potentials were performed. After the patient was  prepped and draped in the standard fashion, the prior incision was  opened sharply through the skin and subcu and blunt dissection was  carried out down to the old cervical plate. The scar tissue and  fibrosis were lysed and cervical plate was exposed and I removed the  screws and the plate, plugged the holes with bone wax. With  microdissection, the C3-4 disk was removed. Anterior osteophytes had  grown over this and these were drilled away. The disk was entered. I  curetted out the disk and removed all the way down to the posterior  longitudinal ligament. The posterior longitudinal ligament was opened  and bilateral foraminotomies were performed. The endplates were squared  away for grafting and provision of a good substrate for the interbody  device. Once this was prepared, distractors were placed and I chose a 7  mm high PEEK cage from [employer]. This was packed with [employer]. The cage  was inserted and countersunk into good position and then a 16-mm [place]  plate was placed and secured with two 14-mm screws in the body of C3 and  C4. Then, I isolated the pseudoarthrosis segment. This was extremely  weak and there was no osseous fusion.   All the scar tissue and some  additional disk was removed and this was all drilled out down to the  posterior longitudinal ligament, which was opened. The endplates were  freshened up to a platform accepting good PEEK cage. A 7 mm cage was  packed with [employer]. This was inserted and countersunk into good  position and then I placed another 16-mm [place] plate and passed under  it with 14-mm screws in the body, 2 in the body of C6 and 2 in the body  of C7. Retractors were removed. Hemostasis was excellent. 80 mg of  Depo-Medrol was placed into the soft tissues. Some FloSeal was placed. The retractor was removed and the wound was closed with interrupted 3-0  Vicryl for the platysma, 3-0 for the subcu, running 4-0 Monocryl for the  skin. The potentials were stable throughout the entire procedure. The  patient was then turned into prone position and placed in the [place]  three-point headrest.  The cervical regions were prepped and draped in  the routine fashion. Incision was made in the upper cervical spine  area. This was opened sharply. The skin and fascia were incised and  the paraspinous muscles were elevated bilaterally off the cervical  lamina. The cervical lamina were exposed completely and confirmed by  x-ray. With the rongeurs, I removed the spinous processes and the [place]  Jung drill was used to remove the lamina in piecemeal fashion. Some of  the bone was saved for later reimplantation. A complete laminectomy  into the lateral recess was carried out and then using AP and lateral  fluoro technique with landmarks,  holes were drilled in the center  of each facet at C3 and C4 bilaterally and then angling out and up 20  degrees, a screw trajectory was made and then 12-mm screws were  placed in the facets bilaterally. Rods were placed to the polyaxial  heads. The lock nuts were applied. Compression was applied and the  lock nuts were torqued off. X-rays demonstrated excellent placement of  the hardware.   The facets were decorticated at C3 and C4 and then the  autograft and remaining allograft was packed into the facets  bilaterally, mostly lateral to the rods. Hemostasis was excellent. Bleeding points were cauterized. Retractors were removed. Layered  closure was carried out with 2-0 Vicryl for the fascia, 3-0 for the  subcu, and staples for the skin. In accordance with CMS guidelines, I  performed the entire procedure. The patient was taken to the Recovery  in stable condition. Rubio Vidal MD    D: 10/16/2021 11:31:26       T: 10/16/2021 14:15:37     [id number]  Job#: [id number]     Doc#: [id number]    CC:   Karuna Butler MD

## 2021-10-16 NOTE — PROGRESS NOTES
Wound cleansed with chlorhexidine swab, dry dressing applied to posterior neck over sutures. Scant amount of serosanguineous drainage noted to incision. Pt tolerated well. Assisted to BR x 1 assist and walker/gait belt. Denies pain. Denies other needs at this time. Neuro assessments WDL. Call light is in reach. Bed alarm is engaged.

## 2021-10-16 NOTE — PROGRESS NOTES
Pt transferred back to [facility]. Pt very drowsy at this time. PO medications held at this time. [place] J collar on. Anterior and posterior incisions CDI. VSS. Skin assessed with no breakdown. Bed alarm on, wheels locked, bed in lowest position, side rails up 2/4, nonskid socks on, call light and bedside table in reach. Will continue to monitor.

## 2021-10-16 NOTE — PROGRESS NOTES
No acute events overnight. VSS. Pt is A/O x4. Pt reports pain mainly in the posterior neck. Incision on the posterior neck has scant serosanguinous drainage. The incision to the right lateral CDI. [place] J collar on. Pt denies numbness in the BUE. Strength I the arms moderate. Pt has swelling to the left knee. Pt has recent  hx of fluid  aspiration to the left knee. Pt ambulates with CGA 2 assist with RW and GB. Pt's left knee cece and states this is baseline for him. Pt voiding post op. Bed alarm on, wheels locked, bed in lowest position, side rails up 2/4, nonskid socks on, call light and bedside table in reach. Will continue to monitor.

## 2021-10-17 LAB
ANION GAP SERPL CALCULATED.3IONS-SCNC: 11 MMOL/L (ref 3–16)
BUN BLDV-MCNC: 24 MG/DL (ref 7–20)
CALCIUM SERPL-MCNC: 8.7 MG/DL (ref 8.3–10.6)
CHLORIDE BLD-SCNC: 101 MMOL/L (ref 99–110)
CO2: 24 MMOL/L (ref 21–32)
CREAT SERPL-MCNC: 1 MG/DL (ref 0.8–1.3)
GFR AFRICAN AMERICAN: >60
GFR NON-AFRICAN AMERICAN: >60
GLUCOSE BLD-MCNC: 122 MG/DL (ref 70–99)
HCT VFR BLD CALC: 39.4 % (ref 40.5–52.5)
HEMOGLOBIN: 12.7 G/DL (ref 13.5–17.5)
MCH RBC QN AUTO: 29.2 PG (ref 26–34)
MCHC RBC AUTO-ENTMCNC: 32.2 G/DL (ref 31–36)
MCV RBC AUTO: 90.8 FL (ref 80–100)
PDW BLD-RTO: 13.8 % (ref 12.4–15.4)
PLATELET # BLD: 388 K/UL (ref 135–450)
PMV BLD AUTO: 7.7 FL (ref 5–10.5)
POTASSIUM REFLEX MAGNESIUM: 4.6 MMOL/L (ref 3.5–5.1)
RBC # BLD: 4.34 M/UL (ref 4.2–5.9)
SODIUM BLD-SCNC: 136 MMOL/L (ref 136–145)
WBC # BLD: 15.9 K/UL (ref 4–11)

## 2021-10-17 PROCEDURE — 97530 THERAPEUTIC ACTIVITIES: CPT

## 2021-10-17 PROCEDURE — 97535 SELF CARE MNGMENT TRAINING: CPT

## 2021-10-17 PROCEDURE — 97168 OT RE-EVAL EST PLAN CARE: CPT

## 2021-10-17 PROCEDURE — 6370000000 HC RX 637 (ALT 250 FOR IP): Performed by: PHYSICIAN ASSISTANT

## 2021-10-17 PROCEDURE — 85027 COMPLETE CBC AUTOMATED: CPT

## 2021-10-17 PROCEDURE — 97116 GAIT TRAINING THERAPY: CPT

## 2021-10-17 PROCEDURE — 80048 BASIC METABOLIC PNL TOTAL CA: CPT

## 2021-10-17 PROCEDURE — 36415 COLL VENOUS BLD VENIPUNCTURE: CPT

## 2021-10-17 PROCEDURE — 1200000000 HC SEMI PRIVATE

## 2021-10-17 PROCEDURE — 97162 PT EVAL MOD COMPLEX 30 MIN: CPT

## 2021-10-17 PROCEDURE — 2580000003 HC RX 258: Performed by: PHYSICIAN ASSISTANT

## 2021-10-17 RX ADMIN — DOCUSATE SODIUM 50 MG AND SENNOSIDES 8.6 MG 1 TABLET: 8.6; 5 TABLET, FILM COATED ORAL at 20:25

## 2021-10-17 RX ADMIN — ACETAMINOPHEN 650 MG: 325 TABLET ORAL at 02:50

## 2021-10-17 RX ADMIN — CYANOCOBALAMIN TAB 1000 MCG 500 MCG: 1000 TAB at 08:08

## 2021-10-17 RX ADMIN — Medication 10 ML: at 20:26

## 2021-10-17 RX ADMIN — DOCUSATE SODIUM 50 MG AND SENNOSIDES 8.6 MG 1 TABLET: 8.6; 5 TABLET, FILM COATED ORAL at 08:08

## 2021-10-17 RX ADMIN — Medication 10 ML: at 08:12

## 2021-10-17 ASSESSMENT — PAIN DESCRIPTION - ONSET: ONSET: ON-GOING

## 2021-10-17 ASSESSMENT — PAIN DESCRIPTION - ORIENTATION: ORIENTATION: POSTERIOR

## 2021-10-17 ASSESSMENT — PAIN DESCRIPTION - DESCRIPTORS: DESCRIPTORS: ACHING

## 2021-10-17 ASSESSMENT — PAIN DESCRIPTION - FREQUENCY: FREQUENCY: CONTINUOUS

## 2021-10-17 ASSESSMENT — PAIN DESCRIPTION - PAIN TYPE: TYPE: SURGICAL PAIN

## 2021-10-17 ASSESSMENT — PAIN DESCRIPTION - LOCATION: LOCATION: NECK

## 2021-10-17 ASSESSMENT — PAIN - FUNCTIONAL ASSESSMENT: PAIN_FUNCTIONAL_ASSESSMENT: ACTIVITIES ARE NOT PREVENTED

## 2021-10-17 ASSESSMENT — PAIN SCALES - GENERAL: PAINLEVEL_OUTOF10: 2

## 2021-10-17 ASSESSMENT — PAIN DESCRIPTION - PROGRESSION: CLINICAL_PROGRESSION: NOT CHANGED

## 2021-10-17 NOTE — PROGRESS NOTES
Hospitalist Progress Note      PCP: Link Ballard MD    Date of Admission: 10/12/2021    Chief Complaint on Admission: weakness, numbness    Pt Seen/Examined and Chart Reviewed. Admitting dx as above    SUBJECTIVE/OBJECTIVE:     Patient admitted from home with progressive weakness and numbness. H/o previous cervical surgery. MRI showed cord compression with myelopathy. Status post anterior cervical discectomy and fusion C3-4 and C6-7 on 10/15. No acute events. Walked with PT/OT. Urinating well. Tolerating PO. Not passing gas and no BM. Allergies  Patient has no known allergies. Medications      Scheduled Meds:   vitamin B-12  500 mcg Oral Daily    sodium chloride flush  10 mL IntraVENous 2 times per day    sennosides-docusate sodium  1 tablet Oral BID       Infusions:   sodium chloride 25 mL (10/16/21 2019)       PRN Meds:  oxyCODONE, sodium chloride flush, sodium chloride, ondansetron **OR** ondansetron, magnesium hydroxide, acetaminophen **OR** acetaminophen    Vitals    TEMPERATURE:  Current - Temp: 98.4 °F (36.9 °C); Max - Temp  Av.6 °F (37 °C)  Min: 98 °F (36.7 °C)  Max: 99.2 °F (37.3 °C)  RESPIRATIONS RANGE: Resp  Av.7  Min: 16  Max: 18  PULSE RANGE: Pulse  Av  Min: 75  Max: 99  BLOOD PRESSURE RANGE:  Systolic (47EVO), OZT:242 , Min:126 , BDC:941   ; Diastolic (76KUM), KLD:06, Min:84, Max:99    PULSE OXIMETRY RANGE: SpO2  Av.6 %  Min: 95 %  Max: 98 %  24HR INTAKE/OUTPUT:      Intake/Output Summary (Last 24 hours) at 10/17/2021 1610  Last data filed at 10/17/2021 0878  Gross per 24 hour   Intake 960 ml   Output --   Net 960 ml       Exam:      General appearance: No apparent distress, appears stated age and cooperative. Neck is in cervical collar. There are right-sided surgical incisions without evidence of infection. Lungs: Clear to ascultation, bilaterally without Rales/Wheezes/Rhonchi with good respiratory effort.   Heart: Regular rate and rhythm with Normal S1/S2 without  murmurs, rubs or gallops, point of maximum impulse non-displaced  Abdomen: Soft, non-tender or non-distended without rigidity or guarding and diminished bowel sounds all four quadrants. Extremities: No clubbing, cyanosis, edema of left knee above knee cap much improved since yesterday  Skin: Skin color, texture, turgor normal.  No rashes or lesions. Neurologic: Alert and oriented X 3, subjective numbness from neck down, weakness improving  mental status: Alert, oriented, thought content appropriate. Data    Recent Labs     10/15/21  0558 10/16/21  1244 10/17/21  0617   WBC 13.1* 17.7* 15.9*   HGB 14.5 12.6* 12.7*   HCT 42.9 38.3* 39.4*    361 388      Recent Labs     10/15/21  0557 10/16/21  1244 10/17/21  0617    135* 136   K 4.4 4.4 4.6    101 101   CO2 24 23 24   BUN 28* 25* 24*   CREATININE 1.1 1.0 1.0     No results for input(s): AST, ALT, ALB, BILIDIR, BILITOT, ALKPHOS in the last 72 hours. Recent Labs     10/15/21  0558   INR 1.01     No results for input(s): CKTOTAL, CKMB, CKMBINDEX, TROPONINI in the last 72 hours. Consults:     IP CONSULT TO HOSPITALIST  IP CONSULT TO NEUROLOGY  IP CONSULT TO CASE MANAGEMENT  IP CONSULT TO NEUROSURGERY  IP CONSULT TO [place] 2 Problems    Diagnosis Date Noted    Cervical myelopathy ([facility] [[email or web address]]ca 75.) [G95.9] 10/15/2021    Generalized weakness [R53.1] 10/12/2021         ASSESSMENT AND PLAN      Generalized numbness, bilateral upper and lower extremity weakness, unsteady gait:  Due to cervical stenosis with myelopathy at C3-C4 level, cord compression  S/p anterior cervical discectomy and fusion C3-4, C6-7 on 10/15  Continue with pain control, PT OT as per neurosurgical team  Patient to wear [place] J collar when out of bed, does not have to wear while asleep or resting in bed. Elevated lipase:  Etiology is not clear. CT A/P non acute. Will refer to GI as outpatient.      Right kidney cyst:  Incidental finding on CT. Discussed with patient and family- will refer for outpatient MRI. Left knee swelling:  Consulted orthopedic surgery, patient underwent left knee aspiration at bedside, fluid sent for analysis, so far WBC is in inflammatory range, Gram stain negative, culture NGTD. Systemic leukocytosis:  Without fever, likely due to recent exposure to steroids, monitor for signs and symptoms of infection  UA is clear. COVID negative. Follow-up results of aspirate. BPH:  Urinating well, continue to monitor for urinary retention        DVT Prophylaxis: Inpatient  Diet: ADULT DIET;  Regular  Code Status: Full Code    PT/OT Eval Status: 18/24    Dispo -inpatient    Brionna Sanders MD

## 2021-10-17 NOTE — PROGRESS NOTES
Neurosurgery Progress Note    Patient seen and examined on 10/17/21. No acute events overnight. Reports worse incisional pain today but notes relatively well controlled. Neurologically stable on exam. Eating breakfast during examination. A/P: 78 yo man POD 2 s/p C3-4 ACDF with posterior screw fixation, C6-7 revision ACDF.      -Neuro stable  -Pain control  -Mobilize, OOB  -Muscle relaxants prn  -PT/OT  -DVT ppx  -Dispo: Likely DC tomorrow pending therapy evaluation      Dixie Chawla MD, PhD  [address], [address] [place] [address], [id number] [phone] (c), [phone] (o)

## 2021-10-17 NOTE — PROGRESS NOTES
collar does NOT need to be worn when eating, bathing or showering    Subjective   General  Chart Reviewed: Yes  Patient assessed for rehabilitation services?: Yes  Additional Pertinent Hx: 77 y.o. male who presened to the ED with weakness and intermittent numbness to bilateral upper and lower extremities. Pt has fallen 2x. CT head (-) ; XR chest (-). MRI Severe cord compression with edema at C3-4.  10/15 underwent  C3-4 ACDF with posterior screw fixation, C6-7 revision ACDF. 10/15 L knee aspiration    PMHx: cervical fusion 2006  Family / Caregiver Present: No  Referring Practitioner: Lindsay Cozy Cloud  Diagnosis: abnormal gait, cord compression  Subjective  Subjective: Pt in bed, agreeable to therapy.  States hopeful to d/c home tomorrow  Patient Currently in Pain: Denies    Social/Functional History  Social/Functional History  Lives With: Spouse  Type of Home: House  Home Layout: One level  Home Access: Stairs to enter with rails  Entrance Stairs - Number of Steps: 5 + 5  Bathroom Shower/Tub: Tub/Shower unit  Bathroom Toilet: Standard  Bathroom Equipment: Grab bars in shower  Home Equipment: Cane  ADL Assistance: Independent  Homemaking Assistance: Independent (wife does the cooking, pt indep with laundry)  Ambulation Assistance: Independent  Transfer Assistance: Independent  Active : Yes  Occupation: Part time employment  Type of occupation:  for fire department       Objective   Vision: Within [address] Exceptions: Wears glasses at all times  Hearing: Within functional limits      Orientation  Overall Orientation Status: Within Normal Limits        Balance  Sitting Balance: Independent  Standing Balance: Stand by assistance    Functional Mobility  Functional - Mobility Device: Rolling Walker  Activity: To/from bathroom, hallway 140'  Assist Level: Contact guard assistance  Functional Mobility Comments: weak and ataxic L LE, relies heavily on walker for support    Toilet Transfers  Toilet - Technique: Ambulating  Equipment Used: Standard toilet  Toilet Transfer: Contact guard assistance  Toilet Transfers Comments: grab bar on R (has sink support at home)    ADL  Grooming: Stand by assistance (oral care, wash face standing at sink)  LE Dressing: Stand by assistance (don shorts)  Toileting: Stand by assistance (simulated, denied need)  Additional Comments: Educated on safe ADLs with brace and cervical precautions. Pt with weak and ataxic L LE, recommend shower chair and supervision for initial bathing- pt verb understanding and agreement       Coordination  Movements Are Fluid And Coordinated: No  Coordination and Movement description: Left UE;Decreased speed;Decreased accuracy  Quality of Movement Other  Comment: decresaed accuracy finger-nose testing L, observed functional use of UE during ADLs with extra time        Bed mobility  Supine to Sit: Supervision (HOB elevated)  Comment: Pt donned cervical brace sitting eob prior to standing       Patient educated on [place] J; including purpose, donning/doffing, fit/positioning, and wear schedule as ordered by surgical team. Patient verbalized and performed return demonstration, confirming understanding. Transfers  Sit to stand: Contact guard assistance  Stand to sit: Stand by assistance        Cognition  Overall Cognitive Status: WFL           Sensation  Overall Sensation Status: Impaired  Additional Comments: diminished sensation L UE        LUE AROM (degrees)  LUE AROM : WFL  RUE AROM (degrees)  RUE AROM : WFL  LUE Strength  Gross LUE Strength: WFL  RUE Strength  Gross RUE Strength: WFL               Treatment consisted of:  ADLs, transfer training, education on activity promotion and fall precautions.         Plan   Plan  Times per week: 5-7  Times per day: Daily  Specific instructions for Next Treatment: awaiting neuro consult for further information regarding pts medical status- recommend performing visual screen next session if seen by OTR/L  Current Treatment Recommendations: Balance Training, Functional Mobility Training, Endurance Training, Self-Care / ADL, Safety Education & Training, Equipment Evaluation, Education, & procurement, Patient/Caregiver Education & Training      AM-PAC Score        AM-PAC Inpatient Daily Activity Raw Score: 19 (10/17/21 1142)  AM-PAC Inpatient ADL T-Scale Score : 40.22 (10/17/21 1142)  ADL Inpatient CMS 0-100% Score: 42.8 (10/17/21 1142)  ADL Inpatient CMS G-Code Modifier : CK (10/17/21 1142)    Goals  Short term goals  Time Frame for Short term goals: by discharge  Short term goal 1: mod I LB dressing- not met  Short term goal 2: Pt will ambulate to/from bathroom w/ 2WW and supervision- not met  Short term goal 3: supervision ADL item retrieval and transport at walker level- not met  Patient Goals   Patient goals : not stated       Therapy Time   Individual Concurrent Group Co-treatment   Time In 1023         Time Out 1101         Minutes 38         Timed Code Treatment Minutes: 23 Minutes +15 min ashlee Resendez OT

## 2021-10-17 NOTE — PLAN OF CARE
Problem: Falls - Risk of:  Goal: Will remain free from falls  Description: Will remain free from falls  10/17/2021 0726 by Chasity Elliott RN  Outcome: Ongoing  Pt is a high fall risk. High fall precautions are in place: nonskid socks, yellow blanket, fall wristband, call light in reach, bed is locked and in lowest position, bed alarm engaged. Problem: Skin Integrity:  Goal: Risk for impaired skin integrity will decrease  Description: Risk for impaired skin integrity will decrease  Outcome: Ongoing   Encourage to turn and reposition q 2 hrs.

## 2021-10-17 NOTE — PROGRESS NOTES
No acute events this shift. Neuro checks and VS WDL. Pt denies pain. Pt ambulated hallway x 1 assist and walker/gait belt. Denies other needs at this time. Call light is in reach. Bed alarm is engaged.

## 2021-10-17 NOTE — PROGRESS NOTES
Pt is A/O x4. VSS on room air. Tolerating ambulation well. [place] J collar on when ambulating. Voiding adequately via BRP. Pain is controlled with PRN Tylenol. Fall precautions are in place. Will continue to monitor.

## 2021-10-17 NOTE — PROGRESS NOTES
Physical Therapy    Facility/Department: [facility] 5T ORTHO/NEURO  Re-evaluation and Treatment    NAME: Stewart Marie  : 1955  MRN: [id number]    Date of Service: 10/17/2021    Discharge Recommendations:    Stewart Marie scored a 18/24 on the AM-PAC short mobility form. Current research shows that an AM-PAC score of 18 or greater is typically associated with a discharge to the patient's home setting. Based on the patient's AM-PAC score and their current functional mobility deficits, it is recommended that the patient have 2-3 sessions per week of Physical Therapy at d/c to increase the patient's independence. At this time, this patient demonstrates the endurance and safety to discharge home with home PT and a follow up treatment frequency of 2-3x/wk. Please see assessment section for further patient specific details. If patient discharges prior to next session this note will serve as a discharge summary. Please see below for the latest assessment towards goals. PT Equipment Recommendations  Equipment Needed: Yes (rolling walker)    Assessment   Body structures, Functions, Activity limitations: Decreased functional mobility ; Decreased strength;Decreased endurance;Decreased balance  Assessment: Pt with decreased independent mobility from baseline s/p C3-4 ACDF with posterior screw fixation, C6-7 revision ACDF. Pt reports normally independent with ambulation with cane. Currently needing CGA for transfers and gt with walker. Pt plans to return home - wife able to assist.  Rec home with 24 hr assist initially and cont skilled PT to maximize mobility and independence. Treatment Diagnosis: impaired gait and transfers  PT Education: Goals;PT Role;Plan of Care;General Safety; Functional Mobility Training;Precautions  Patient Education: Pt verbalized understanding  REQUIRES PT FOLLOW UP: Yes       Patient Diagnosis(es): The primary encounter diagnosis was Muscle weakness of extremity.  A diagnosis of Abnormal gait was also pertinent to this visit. has a past medical history of BPH (benign prostatic hyperplasia). has a past surgical history that includes Cholecystectomy, laparoscopic (2009); cervical fusion (2006); and Leg Surgery (2000). Restrictions  Position Activity Restriction  Other position/activity restrictions: up as tolerated, [place] J Cervical collar to be worn when out of bed - Cervical collar does NOT need to be worn when eating, bathing or showering  Vision/Hearing        Subjective  General  Chart Reviewed: Yes  Additional Pertinent Hx: Admit 10/12 with B UE and LE weakness and numbness, feeling off balance; head CT: (-); COVID (-);  L knee aspirtation 10/15. MRI Cspine: Multilevel spondylosis is present, worse at C3-C4 where there is severe bilateral neural foraminal stenosis. Pt s/p C3-4 ACDF with posterior screw fixation, C6-7 revision ACDF on 10/15. PMHx: cervical fusion, partial gastroc resection  Referring Practitioner: Antonia Gaiatn MD  Referral Date : 10/16/21  Subjective  Subjective: Pt found supine. Agreeable to PT. Reports has been up to bathroom with nursing. \"Feels good to be up. \"  Has numbness in BUE - slightly improved since surgery  Pain Screening  Patient Currently in Pain: Denies  Vital Signs  Patient Currently in Pain: Denies       Orientation     Social/Functional History  Social/Functional History  Lives With: Spouse  Type of Home: House  Home Layout: One level  Home Access: Stairs to enter with rails  Entrance Stairs - Number of Steps: 5 + 5  Bathroom Shower/Tub: Tub/Shower unit  Bathroom Toilet: Standard  Bathroom Equipment: Grab bars in shower  Home Equipment: Cane  ADL Assistance: Independent  Homemaking Assistance: Independent (wife does the cooking, pt indep with laundry)  Ambulation Assistance: Independent  Transfer Assistance: Independent  Active : Yes  Occupation: Part time employment  Type of occupation:  for fire department  Cognition Objective          AROM RLE (degrees)  RLE AROM: WFL  AROM LLE (degrees)  LLE AROM : WFL (slightly decreased ROM L knee flexion with increased swelling noted)  Strength RLE  Strength RLE: WFL  Strength LLE  Strength LLE: WFL        Bed mobility  Supine to Sit: Supervision (HOB elevated)  Comment: cervical braced donned sitting EOB prior to standing  Transfers  Sit to Stand: Contact guard assistance  Stand to sit: Contact guard assistance  Ambulation  Ambulation?: Yes  Ambulation 1  Device: Rolling Walker  Assistance: Contact guard assistance  Quality of Gait: heavy UE support on walker, decreased L knee flexion in swing phase, LEs slightly shaky and unsteady but no LOB  Distance: 150'              Plan   Plan  Times per week: 5-7  Current Treatment Recommendations: Strengthening, Balance Training, Functional Mobility Training, Transfer Training, Gait Training, Stair training, Equipment Evaluation, Education, & procurement, Patient/Caregiver Education & Training  Safety Devices  Type of devices: Call light within reach, Chair alarm in place, Nurse notified, Left in chair    G-Code       OutComes Score                                                  AM-PAC Score  AM-PAC Inpatient Mobility Raw Score : 18 (10/17/21 1112)  AM-PAC Inpatient T-Scale Score : 43.63 (10/17/21 1112)  Mobility Inpatient CMS 0-100% Score: 46.58 (10/17/21 1112)  Mobility Inpatient CMS G-Code Modifier : CK (10/17/21 1112)          Goals  Short term goals  Time Frame for Short term goals: By discharge  Short term goal 1: Sup to sit independent  Short term goal 2: Pt will transfer sit to stand supervision  Short term goal 3: Pt will amb >200' with LRAD supervision  Short term goal 4: Pt will up/down 5 steps with rail/LRAD supervision  Patient Goals   Patient goals : return home       Therapy Time   Individual Concurrent Group Co-treatment   Time In 1023         Time Out 1101         Minutes 38               Timed Code Treatment Minutes: 23    Total Treatment Minutes:  [address] [place], [place]

## 2021-10-17 NOTE — PLAN OF CARE
Problem: Falls - Risk of:  Goal: Will remain free from falls  Description: Will remain free from falls  10/16/2021 2256 by Alesia Muse RN  Outcome: Ongoing  Note: Fall precautions are in place. Pt is in bed with bed alarm on. Using call light appropriately. Call light and belongings are within reach. Problem: Sensory:  Goal: Pain level will decrease  Description: Pain level will decrease  Outcome: Ongoing  Note: Pt educated on 0-10 pain rating scale. Denies pain. Will continue to monitor.

## 2021-10-18 VITALS
DIASTOLIC BLOOD PRESSURE: 88 MMHG | TEMPERATURE: 98.8 F | HEART RATE: 104 BPM | WEIGHT: 174.1 LBS | OXYGEN SATURATION: 94 % | BODY MASS INDEX: 27.33 KG/M2 | RESPIRATION RATE: 18 BRPM | HEIGHT: 67 IN | SYSTOLIC BLOOD PRESSURE: 124 MMHG

## 2021-10-18 PROBLEM — N28.1 KIDNEY CYST, ACQUIRED: Status: ACTIVE | Noted: 2021-10-18

## 2021-10-18 PROBLEM — Z98.1 S/P CERVICAL SPINAL FUSION: Status: ACTIVE | Noted: 2021-10-18

## 2021-10-18 PROBLEM — R74.8 ELEVATED LIPASE: Status: ACTIVE | Noted: 2021-10-18

## 2021-10-18 LAB
ANION GAP SERPL CALCULATED.3IONS-SCNC: 13 MMOL/L (ref 3–16)
BUN BLDV-MCNC: 24 MG/DL (ref 7–20)
CALCIUM SERPL-MCNC: 8.6 MG/DL (ref 8.3–10.6)
CHLORIDE BLD-SCNC: 98 MMOL/L (ref 99–110)
CO2: 24 MMOL/L (ref 21–32)
CREAT SERPL-MCNC: 1 MG/DL (ref 0.8–1.3)
GFR AFRICAN AMERICAN: >60
GFR NON-AFRICAN AMERICAN: >60
GLUCOSE BLD-MCNC: 112 MG/DL (ref 70–99)
HCT VFR BLD CALC: 37.9 % (ref 40.5–52.5)
HEMOGLOBIN: 12.7 G/DL (ref 13.5–17.5)
MCH RBC QN AUTO: 30.2 PG (ref 26–34)
MCHC RBC AUTO-ENTMCNC: 33.6 G/DL (ref 31–36)
MCV RBC AUTO: 89.9 FL (ref 80–100)
PDW BLD-RTO: 13.6 % (ref 12.4–15.4)
PLATELET # BLD: 409 K/UL (ref 135–450)
PMV BLD AUTO: 7.7 FL (ref 5–10.5)
POTASSIUM REFLEX MAGNESIUM: 4.6 MMOL/L (ref 3.5–5.1)
RBC # BLD: 4.22 M/UL (ref 4.2–5.9)
SODIUM BLD-SCNC: 135 MMOL/L (ref 136–145)
WBC # BLD: 13.5 K/UL (ref 4–11)

## 2021-10-18 PROCEDURE — 6370000000 HC RX 637 (ALT 250 FOR IP): Performed by: PHYSICIAN ASSISTANT

## 2021-10-18 PROCEDURE — 80048 BASIC METABOLIC PNL TOTAL CA: CPT

## 2021-10-18 PROCEDURE — 97530 THERAPEUTIC ACTIVITIES: CPT

## 2021-10-18 PROCEDURE — 97116 GAIT TRAINING THERAPY: CPT

## 2021-10-18 PROCEDURE — 97535 SELF CARE MNGMENT TRAINING: CPT

## 2021-10-18 PROCEDURE — 2580000003 HC RX 258: Performed by: PHYSICIAN ASSISTANT

## 2021-10-18 PROCEDURE — 85027 COMPLETE CBC AUTOMATED: CPT

## 2021-10-18 PROCEDURE — 36415 COLL VENOUS BLD VENIPUNCTURE: CPT

## 2021-10-18 RX ORDER — OXYCODONE HYDROCHLORIDE 5 MG/1
5 TABLET ORAL EVERY 6 HOURS PRN
Qty: 28 TABLET | Refills: 0 | Status: SHIPPED | OUTPATIENT
Start: 2021-10-18 | End: 2021-10-25

## 2021-10-18 RX ORDER — SENNA PLUS 8.6 MG/1
1 TABLET ORAL 2 TIMES DAILY
Qty: 60 TABLET | Refills: 0 | Status: SHIPPED | OUTPATIENT
Start: 2021-10-18 | End: 2021-11-17

## 2021-10-18 RX ORDER — METHOCARBAMOL 500 MG/1
750 TABLET, FILM COATED ORAL EVERY 6 HOURS PRN
Qty: 40 TABLET | Refills: 0 | Status: SHIPPED | OUTPATIENT
Start: 2021-10-18 | End: 2021-10-28

## 2021-10-18 RX ADMIN — CYANOCOBALAMIN TAB 1000 MCG 500 MCG: 1000 TAB at 07:53

## 2021-10-18 RX ADMIN — Medication 10 ML: at 08:00

## 2021-10-18 RX ADMIN — DOCUSATE SODIUM 50 MG AND SENNOSIDES 8.6 MG 1 TABLET: 8.6; 5 TABLET, FILM COATED ORAL at 07:53

## 2021-10-18 RX ADMIN — Medication 10 ML: at 07:53

## 2021-10-18 ASSESSMENT — PAIN SCALES - GENERAL: PAINLEVEL_OUTOF10: 0

## 2021-10-18 NOTE — PLAN OF CARE
Problem: Falls - Risk of:  Goal: Will remain free from falls  Description: Will remain free from falls  Outcome: Ongoing  Note: Fall precautions are in place. Pt is in bed with bed alarm on. Using call light appropriately. Call light and belongings are within reach. Problem: Activity:  Goal: Ability to tolerate increased activity will improve  Description: Ability to tolerate increased activity will improve  Outcome: Ongoing  Note: Pt tolerating ambulation in room well. Ambulation in [place] tolerated well. Pt utilizing walker, GB, and [place] J collar with ambulation.

## 2021-10-18 NOTE — PROGRESS NOTES
Physical Therapy  Facility/Department: [facility] 5T ORTHO/NEURO  Daily Treatment Note  NAME: Radha Tinajero  : 1955  MRN: [id number]    Date of Service: 10/18/2021    Discharge Recommendations:  Radha Tinajero scored a 20/24 on the AM-PAC short mobility form. Current research shows that an AM-PAC score of 18 or greater is typically associated with a discharge to the patient's home setting. Based on the patient's AM-PAC score and their current functional mobility deficits, it is recommended that the patient have 2-3 sessions per week of Physical Therapy at d/c to increase the patient's independence.  At this time, this patient demonstrates the endurance and safety to discharge home with home PT and a follow up treatment frequency of 2-3x/wk. Please see assessment section for further patient specific details. PT Equipment Recommendations  Pt would benefit from home therapy progressing to OP PT to address Gait deficits. Equipment Needed: Yes  Mobility Devices: [place]: Rolling    Assessment   Assessment: Pt is progressing well but limited by BLE weakness and gait deficits. Transfers and gait training were mildly unsteady requiring sequencing cues throughout; no LOB noted. Managed the stairs safely with min sequencing cues; no LOB or buckling noted. Treatment Diagnosis: impaired gait and transfers  Prognosis: Good  Decision Making: Medium Complexity  PT Education: Goals;PT Role;Plan of Care;General Safety; Functional Mobility Training;Precautions  Patient Education: Pt verbalized understanding  REQUIRES PT FOLLOW UP: Yes     Patient Diagnosis(es): The primary encounter diagnosis was Muscle weakness of extremity. Diagnoses of Abnormal gait and S/P cervical spinal fusion were also pertinent to this visit. has a past medical history of BPH (benign prostatic hyperplasia). has a past surgical history that includes Cholecystectomy, laparoscopic (); cervical fusion ();  Leg Surgery (2000); and cervical fusion (N/A, 10/15/2021). Restrictions  Position Activity Restriction  Other position/activity restrictions: up as tolerated, [place] J Cervical collar to be worn when out of bed - Cervical collar does NOT need to be worn when eating, bathing or showering  Subjective   General  Additional Pertinent Hx: Admit 10/12 with B UE and LE weakness and numbness, feeling off balance; head CT: (-); COVID (-);  L knee aspirtation 10/15. MRI Cspine: Multilevel spondylosis is present, worse at C3-C4 where there is severe bilateral neural foraminal stenosis. Pt s/p C3-4 ACDF with posterior screw fixation, C6-7 revision ACDF on 10/15. PMHx: cervical fusion, partial gastroc resection  Subjective  Subjective: Pt was in the chair willing to participate; denies pain  Orientation  Orientation  Overall Orientation Status: Within Normal Limits  Objective   Transfers  Sit to Stand: Contact guard assistance  Stand to sit: Contact guard assistance  Ambulation  Ambulation?: Yes  Ambulation 1  Device: Rolling Walker  Assistance: Contact guard assistance  Quality of Gait: slow reciprocal pattern with bilateral trendelenburg left more severe than right resulting in scissoring. Inconsistent L foot clearance; decreased L knee flexion in swing phase. Inconsistent foot placement. Distance: 300ft  Stairs/Curb  Stairs?: Yes  Stairs  # Steps : 4  Stairs Height: 6\"  Rails: Left ascending  Assistance: Stand by assistance;Contact guard assistance  Comment: up with RLE down with left  Balance  Sitting - Static: Good  Sitting - Dynamic: Good  Standing - Static: Fair  Standing - Dynamic: Fair  Exercises  Knee Long Arc Quad: 15x5 sec B (mild hip ataxia). Ankle Pumps: x20 B (cues for L foot dorsi flexion).      AM-PAC Score  AM-PAC Inpatient Mobility Raw Score : 20 (10/18/21 1019)  AM-PAC Inpatient T-Scale Score : 47.67 (10/18/21 1019)  Mobility Inpatient CMS 0-100% Score: 35.83 (10/18/21 1019)  Mobility Inpatient CMS G-Code Modifier : Mandeep Lambert (10/18/21 1019)    Goals  Short term goals  Time Frame for Short term goals: By discharge  Short term goal 1: Sup to sit independent  Short term goal 2: Pt will transfer sit to stand supervision  Short term goal 3: Pt will amb >200' with LRAD supervision  Short term goal 4: Pt will up/down 5 steps with rail/LRAD supervision  Patient Goals   Patient goals : return home    Plan    Plan  Times per week: 5-7  Current Treatment Recommendations: Strengthening, Balance Training, Functional Mobility Training, Transfer Training, Gait Training, Stair training, Equipment Evaluation, Education, & procurement, Patient/Caregiver Education & Training  Safety Devices  Type of devices: Call light within reach, Chair alarm in place, Nurse notified, Left in chair     Therapy Time   Individual Concurrent Group Co-treatment   Time In 0945         Time Out 1008         Minutes 23         Timed Code Treatment Minutes: 121 Sara Nguyen, PTA

## 2021-10-18 NOTE — PROGRESS NOTES
Department of Orthopedic Surgery  Physician Assistant  Progress Note    Subjective:     Ortho Consulted for left knee effusion. 3 days s/p left knee aspiration. Gram stain negative. NGTD on fluid culture. Systemic or Specific Complaints: Left knee stiffness. No left knee pain. Mild discomfort over surgical incision on his neck. Notes some improvement in numbness. Mild re-accumulation of left knee effusion. Denies fevers or chills. No calf pain. SCD in place. Objective:     Patient Vitals for the past 24 hrs:   BP Temp Temp src Pulse Resp SpO2   10/18/21 0221 (!) 147/90 98.2 °F (36.8 °C) Oral 88 16 93 %   10/17/21 2253 (!) 147/93 98.2 °F (36.8 °C) Oral 93 16 96 %   10/17/21 1855 (!) 155/95 98.9 °F (37.2 °C) Oral 99 16 95 %   10/17/21 1437 (!) 162/99 98.4 °F (36.9 °C) Oral 99 18 98 %   10/17/21 1234 138/86 99.2 °F (37.3 °C) Oral 96 18 95 %   10/17/21 1118 126/84 98.3 °F (36.8 °C) Oral 94 18 96 %   10/17/21 0717 134/87 98 °F (36.7 °C) Oral 75 16 97 %       General: alert, appears stated age and cooperative   Wound: No Erythema and Positive for mild left knee effusion   Motion: Painless Range of Motion in affected extremity   N/V 2+ dorsal pedis pulse, sensation intact to light touch in bilateral LE   DVT Exam: No evidence of DVT seen on physical exam.  Negative Zuri's sign. Data Review  CBC:   Lab Results   Component Value Date    WBC 13.5 10/18/2021    RBC 4.22 10/18/2021    HGB 12.7 10/18/2021    HCT 37.9 10/18/2021     10/18/2021           Assessment:     Exacerbation of pre-existing left knee OA  Left knee effusion    Plan:     IMPRESSION/RECOMMENDATIONS:   1. Left knee aspiration: low WBC count. NGTD. Hold 21 days  2. Ice  3. Compression with ace wrap or knee sleeve   4. Elevation with pillow under ankle  5. Pain control per primary team  6. PT/OT as tolerated  7. DVT prophylaxis per primary team  8. Encourage ankle pumps  9. Follow up outpatient with Dr. Nevaeh Del Valle   10. Orthopedics will continue to follow while admitted       Mikaela Montague PA-C    Physician Assistant - Niecy Rae    10/18/21 6:59 AM        This dictation was performed with a verbal recognition program (DRAGON) and it was checked for errors. It is possible that there are still dictated errors within this office note. If so, please bring any errors to my attention for an addendum. All efforts were made to ensure that this office note is accurate.

## 2021-10-18 NOTE — DISCHARGE INSTR - COC
Continuity of Care Form    Patient Name: Sharad Alexander   :  1955  MRN:  [id number]    Admit date:  10/12/2021  Discharge date:  10/18/2021     Code Status Order: Full Code   Advance Directives:     Admitting Physician:  Liana Johnston MD  PCP: Carmen Amato MD    Discharging Nurse: [facility] Unit/Room#: 2800/4607-90  Discharging Unit Phone Number: ***    Emergency Contact:   Extended Emergency Contact Information  Primary Emergency Contact: [facility]  Address: [address] [place] [address], [address] [place], [place] [address]in [place]  Home Phone: [phone]  Relation: Spouse    Past Surgical History:  Past Surgical History:   Procedure Laterality Date    CERVICAL FUSION  2006    CERVICAL FUSION N/A 10/15/2021    ANTERIOR CERVICAL DISCECTOMY AND FUSION C3-4 AND C6-7, DECOMPRESSION AND FUSION C3-4 POSTERIOR/ REMOVAL OF PLATES AND LATERAL MASS SCREW performed by Enzo Francis MD at [address] [place] [address], [facility]      LEG SURGERY      benign tumor; partial gastronemeus resection       Immunization History:   Immunization History   Administered Date(s) Administered    COVID-19, García Peter, PF, 30mcg/0.3mL 2021, 2021    Hepatitis B 2003, 2003    Tdap (Boostrix, Adacel) 2017       Active Problems:  Patient Active Problem List   Diagnosis Code    Bronchitis, acute J20.9    Abdominal pain, RUQ R10.11    Generalized weakness R53.1    Cervical myelopathy ([address] G95.9    S/P cervical spinal fusion Z98.1    Kidney cyst, acquired N28.1    Elevated lipase R74.8       Isolation/Infection:   Isolation          No Isolation        Patient Infection Status     Infection Onset Added Last Indicated Last Indicated By Review Planned Expiration Resolved Resolved By    None active    Resolved    COVID-19 Rule Out 10/12/21 10/12/21 10/12/21 COVID-19 (Ordered)   10/13/21 Rule-Out Test Resulted          Nurse Assessment:  Last Vital Signs: /88   Pulse 104 Temp 98.8 °F (37.1 °C) (Oral)   Resp 18   Ht 5' 7\" (1.702 m)   Wt 174 lb 1.6 oz (79 kg)   SpO2 94%   BMI 27.27 kg/m²     Last documented pain score (0-10 scale): Pain Level: 0  Last Weight:   Wt Readings from Last 1 Encounters:   10/17/21 174 lb 1.6 oz (79 kg)     Mental Status:  {IP PT MENTAL STATUS:71588}    IV Access:  { BONNIE IV ACCESS:442687861}    Nursing Mobility/ADLs:  Walking   {P DME PPFO:300960223}  Transfer  {P DME OLEU:994945980}  Bathing  {P DME OKTS:500877227}  Dressing  {CHP DME FMQD:089380167}  Toileting  {CHP DME EZDY:390188870}  Feeding  {[facility] DME FUVE:551800608}  Med Admin  {[facility] DME UHTO:475593117}  Med Delivery   { BONNIE MED Delivery:930268747}    Wound Care Documentation and Therapy:        Elimination:  Continence:   · Bowel: {YES / AN:94680}  · Bladder: {YES / SB:65362}  Urinary Catheter: {Urinary Catheter:638184295}   Colostomy/Ileostomy/Ileal Conduit: {YES / YE:94709}       Date of Last BM: ***    Intake/Output Summary (Last 24 hours) at 10/18/2021 1212  Last data filed at 10/17/2021 2020  Gross per 24 hour   Intake 1080 ml   Output --   Net 1080 ml     I/O last 3 completed shifts:   In: 36 [P.O.:1320]  Out: -     Safety Concerns:     [employer] Safety Concerns:532311231}    Impairments/Disabilities:      [employer] Impairments/Disabilities:402260652}    Nutrition Therapy:  Current Nutrition Therapy:   [employer] Diet List:377292861}    Routes of Feeding: {P DME Other Feedings:104519703}  Liquids: {Slp liquid thickness:94042}  Daily Fluid Restriction: {CHP DME Yes amt example:851856723}  Last Modified Barium Swallow with Video (Video Swallowing Test): {Done Not Done HKAO:904588255}    Treatments at the Time of Hospital Discharge:   Respiratory Treatments: ***  Oxygen Therapy:  {Therapy; copd oxygen:52640}  Ventilator:    {PATRICIA VIERA Vent MDBT:958382419}    Rehab Therapies: Physical Therapy  Weight Bearing Status/Restrictions: {PATRICIA VIERA Weight Bearin}  Other Medical Equipment (for information only, NOT a DME order):  {EQUIPMENT:594788483}  Other Treatments: ***    Patient's personal belongings (please select all that are sent with patient):  {CHP DME Belongings:460620234}    RN SIGNATURE:  {Esignature:642941574}    CASE MANAGEMENT/SOCIAL WORK SECTION    Inpatient Status Date: 10/12/2021     Readmission Risk Assessment Score:  Readmission Risk              Risk of Unplanned Readmission:  10           Discharging to Facility/ [address] [facility] [address]   Phone: [phone]     [address] [place] [address])   Phone: [phone]    / signature: Electronically signed by MILTON Leigh on 10/18/21 at 1:16 PM EDT    PHYSICIAN SECTION    Prognosis: {Prognosis:9442191011}    Condition at Discharge: [address] [place] Patient Condition:525927130}    Rehab Potential (if transferring to Rehab): {Prognosis:1749976272}    Recommended Labs or Other Treatments After Discharge: ***    Physician Certification: I certify the above information and transfer of Sade Burger  is necessary for the continuing treatment of the diagnosis listed and that he requires {Admit to Appropriate Level of Care:05611} for {GREATER/LESS:856040237} 30 days.      Update Admission H&P: {CHP DME Changes in KDFY}    PHYSICIAN SIGNATURE:  {Esignature:324350253}

## 2021-10-18 NOTE — PROGRESS NOTES
Patient discharged from unit in stable condition with belongings via wheelchair. Discharge education complete. No further questions at this time.

## 2021-10-18 NOTE — DISCHARGE SUMMARY
Hospital Medicine Discharge Summary      Patient ID: Sushil Bacon      Patient's PCP: Alee Lopez MD    Admit Date: 10/12/2021     Discharge Date: 10/18/2021      Admitting Physician: Chepe Cody MD    Discharge Physician: Viktoria Gtz MD     Discharge Diagnoses: Active Hospital Problems    Diagnosis Date Noted    S/P cervical spinal fusion [Z98.1] 10/18/2021    Kidney cyst, acquired [N28.1] 10/18/2021    Elevated lipase [R74.8] 10/18/2021    Cervical myelopathy (HCC) [G95.9] 10/15/2021    Generalized weakness [R53.1] 10/12/2021         The patient was seen and examined on day of discharge and this discharge summary is in conjunction with any daily progress note from day of discharge. Hospital Course:     Patient is a 76 y/o male who presented with progressive numbness and weakness. He was evaluated by neurology and then neurosurgery. Found to have severe cervical canal stenosis with myelopathy. Underwent C3-4 ACDF with posterior screw fixation and C6-7 revision ACDF on October 15. He recovered well post procedure. Tolerated PT/OT. Cleared for discharge by [address] [place] [address]. As of note he was found to have elevated lipase level and right renal cyst. Radiology recommended MRI kidney with and without contrast for evaluation. Discussed with patient and family- they preferred to follow up with PCP and complete that after recovery from his surgery. Consults:     IP CONSULT TO HOSPITALIST  IP CONSULT TO NEUROLOGY  IP CONSULT TO CASE MANAGEMENT  IP CONSULT TO NEUROSURGERY  IP CONSULT TO ORTHOPEDIC SURGERY  IP CONSULT TO HOME CARE NEEDS  IP CONSULT TO HOME CARE NEEDS      Disposition:  Home     Discharged Condition: Stable    Code Status: Prior    Activity: activity as tolerated    Diet: regular diet    Follow Up: Primary Care Physician in one week and complete MRI kidney once healed from surgery    Exam:     General appearance: No apparent distress, appears stated age and cooperative.   Neck: surgical incisions are clean and intact  Lungs: Clear to ascultation, bilaterally without Rales/Wheezes/Rhonchi with good respiratory effort. Heart: Regular rate and rhythm with Normal S1/S2 without  murmurs, rubs or gallops, point of maximum impulse non-displaced  Abdomen: Soft, non-tender or non-distended without rigidity or guarding and positive bowel sounds all four quadrants. Extremities: No clubbing, cyanosis, or edema bilaterally. Skin: Skin color, texture, turgor normal.  Neurologic: Alert and oriented X 3,  grossly non-focal.  Mental status: Alert, oriented, thought content appropriate      Labs: For convenience and continuity at follow-up the following most recent labs are provided:    CBC:   Lab Results   Component Value Date    WBC 13.5 10/18/2021    HGB 12.7 10/18/2021    HCT 37.9 10/18/2021     10/18/2021       RENAL:   Lab Results   Component Value Date     10/18/2021    K 4.6 10/18/2021    CL 98 10/18/2021    CO2 24 10/18/2021    BUN 24 10/18/2021    CREATININE 1.0 10/18/2021           Discharge Medications:   Discharge Medication List as of 10/18/2021  1:41 PM           Details   oxyCODONE (ROXICODONE) 5 MG immediate release tablet Take 1 tablet by mouth every 6 hours as needed for Pain for up to 7 days. Intended supply: 3 days. Take lowest dose possible to manage pain, Disp-28 tablet, R-0Print      methocarbamol (ROBAXIN) 500 MG tablet Take 1.5 tablets by mouth every 6 hours as needed (muscle spasm), Disp-40 tablet, R-0Print      senna (SENOKOT) 8.6 MG tablet Take 1 tablet by mouth 2 times daily, Disp-60 tablet, R-0Print      vitamin B-12 500 MCG tablet Take 1 tablet by mouth daily, Disp-30 tablet, R-3Normal                Time Spent on discharge is less than 30 minutes in the examination, evaluation, counseling and review of medications and discharge plan.       Signed:  Vinh Alonso MD   10/18/2021      Thank you Sandra Craaballo MD for the opportunity to be involved in this patient's care. If you have any questions or concerns please feel free to contact me at [phone].

## 2021-10-18 NOTE — PROGRESS NOTES
A/O x4. VSS on room air. Tolerating ambulation well. Ambulating in halls. Denies pain. Urinating well with BRP. Fall precautions are in place.

## 2021-10-18 NOTE — PLAN OF CARE
improve  Outcome: Ongoing     Problem: Physical Regulation:  Goal: Will remain free from infection  Description: Will remain free from infection  Outcome: Ongoing     Problem: Respiratory:  Goal: Respiratory status will improve  Description: Respiratory status will improve  Outcome: Ongoing     Problem: Sensory:  Goal: Pain level will decrease  Description: Pain level will decrease  Outcome: Ongoing     Problem: Sensory:  Goal: Satisfaction with pain management regimen will improve  Description: Satisfaction with pain management regimen will improve  Outcome: Ongoing     Problem: Skin Integrity:  Goal: Risk for impaired skin integrity will decrease  Description: Risk for impaired skin integrity will decrease  Outcome: Ongoing     Problem: Urinary Elimination:  Goal: Ability to achieve and maintain adequate urine output will improve  Description: Ability to achieve and maintain adequate urine output will improve  Outcome: Ongoing     Problem: Discharge Planning:  Goal: Discharged to appropriate level of care  Description: Discharged to appropriate level of care  Outcome: Ongoing     Problem: Cerebrospinal Fluid Leakage - Risk Of:  Goal: Absence of cerebrospinal fluid drainage at surgical site  Description: Absence of cerebrospinal fluid drainage at surgical site  Outcome: Ongoing     Problem: Mobility - Impaired:  Goal: Mobility will improve to maximum level  Description: Mobility will improve to maximum level  Outcome: Ongoing     Problem: Pain - Acute:  Goal: Pain level will decrease  Description: Pain level will decrease  Outcome: Ongoing

## 2021-10-18 NOTE — CARE COORDINATION
Esha    Patient aware and agreeable to services. Called Intake with referral [phone].  Ohiotanya will pull referral from [facility] and call patient to arrange services for [facility] by 10/20/21  Electronically signed by Maximilian Cochran LPN on 98/22/4469 at 12:57 PM

## 2021-10-18 NOTE — PLAN OF CARE
Problem: Falls - Risk of:  Goal: Will remain free from falls  Description: Will remain free from falls  10/18/2021 1312 by Judith Marcos RN  Outcome: Completed     Problem: Falls - Risk of:  Goal: Absence of physical injury  Description: Absence of physical injury  10/18/2021 1312 by Judith Marcos RN  Outcome: Completed     Problem: Activity:  Goal: Ability to avoid complications of mobility impairment will improve  Description: Ability to avoid complications of mobility impairment will improve  10/18/2021 1312 by Judith Marcos RN  Outcome: Completed     Problem: Activity:  Goal: Ability to tolerate increased activity will improve  Description: Ability to tolerate increased activity will improve  10/18/2021 1312 by Judith Marcos RN  Outcome: Completed     Problem:  Bowel/Gastric:  Goal: Gastrointestinal status for postoperative course will improve  Description: Gastrointestinal status for postoperative course will improve  10/18/2021 1312 by Judith Marcos RN  Outcome: Completed     Problem: Fluid Volume:  Goal: Maintenance of adequate hydration will improve  Description: Maintenance of adequate hydration will improve  10/18/2021 1312 by Judith Marcos RN  Outcome: Completed     Problem: Health Behavior:  Goal: Identification of resources available to assist in meeting health care needs will improve  Description: Identification of resources available to assist in meeting health care needs will improve  10/18/2021 1312 by Judith Marcos RN  Outcome: Completed     Problem: Health Behavior:  Goal: Ability to state signs and symptoms to report to health care provider will improve  Description: Ability to state signs and symptoms to report to health care provider will improve  10/18/2021 1312 by Judith Marcos RN  Outcome: Completed     Problem: Nutritional:  Goal: Ability to attain and maintain optimal nutritional status will improve  Description: Ability to attain and maintain optimal nutritional status will improve  10/18/2021 1312 by Gurinder Hopkins RN  Outcome: Completed     Problem: Physical Regulation:  Goal: Ability to maintain clinical measurements within normal limits will improve  Description: Ability to maintain clinical measurements within normal limits will improve  10/18/2021 1312 by Gurinder Hopkins RN  Outcome: Completed     Problem: Physical Regulation:  Goal: Will remain free from infection  Description: Will remain free from infection  10/18/2021 1312 by Gurinder Hopkins RN  Outcome: Completed     Problem: Respiratory:  Goal: Respiratory status will improve  Description: Respiratory status will improve  10/18/2021 1312 by Gurinder Hopkins RN  Outcome: Completed     Problem: Sensory:  Goal: Pain level will decrease  Description: Pain level will decrease  10/18/2021 1312 by Gurinder Hopkins RN  Outcome: Completed     Problem: Sensory:  Goal: Satisfaction with pain management regimen will improve  Description: Satisfaction with pain management regimen will improve  10/18/2021 1312 by Gurinder Hopkins RN  Outcome: Completed     Problem: Skin Integrity:  Goal: Risk for impaired skin integrity will decrease  Description: Risk for impaired skin integrity will decrease  10/18/2021 1312 by Gurinder Hopkins RN  Outcome: Completed     Problem: Urinary Elimination:  Goal: Ability to achieve and maintain adequate urine output will improve  Description: Ability to achieve and maintain adequate urine output will improve  10/18/2021 1312 by Gurinder Hopkins RN  Outcome: Completed     Problem: Discharge Planning:  Goal: Discharged to appropriate level of care  Description: Discharged to appropriate level of care  10/18/2021 1312 by Gurinder Hopkins RN  Outcome: Completed     Problem: Cerebrospinal Fluid Leakage - Risk Of:  Goal: Absence of cerebrospinal fluid drainage at surgical site  Description: Absence of cerebrospinal fluid drainage at surgical site  10/18/2021 1312 by Saul Barksdale RN  Outcome: Completed     Problem: Mobility - Impaired:  Goal: Mobility will improve to maximum level  Description: Mobility will improve to maximum level  10/18/2021 1312 by Saul Barksdale RN  Outcome: Completed     Problem: Pain - Acute:  Goal: Pain level will decrease  Description: Pain level will decrease  10/18/2021 1312 by Saul Barksdale RN  Outcome: Completed

## 2021-10-18 NOTE — CARE COORDINATION
Case Management            Discharge Note                    Date / Time of Note: 10/18/2021 1:16 PM                  Discharge Note Completed by: MILTON Chan    Patient Name: Ant Moore   YOB: 1955  Diagnosis: Abnormal gait [R26.9]  Generalized weakness [R53.1]  Muscle weakness of extremity [M62.81]   Date / Time: 10/12/2021  3:13 PM    Current PCP: Saul Crowley MD  Clinic patient: Yes    Hospitalization in the last 30 days: No    Advance Directives:  Code Status: Full Code  [place] DNR form completed and on chart: Not Indicated    Financial:  Payor: UMR / Plan: UMR  / Product Type: *No Product type* /      Pharmacy:    [address] [place] [address] [address] [address]  Phone: [phone] Fax: [phone]      Assistance purchasing medications?: Potential Assistance Purchasing Medications: No  Assistance provided by Case Management: None at this time    Does patient want to participate in local refill/ meds to beds program?: Not Assessed    Meds To Beds General Rules:  1. Can ONLY be done Monday- Friday between 8:30am-5pm  2. Prescription(s) must be in pharmacy by 3pm to be filled same day  3. Copy of patient's insurance/ prescription drug card and patient face sheet must be sent along with the prescription(s)  4. Cost of Rx cannot be added to hospital bill. If financial assistance is needed, please contact unit  or ;  or  CANNOT provide pharmacy voucher for patients co-pays  5.  Patients can then  the prescription on their way out of the hospital at discharge, or pharmacy can deliver to the bedside if staff is available. (payment due at time of pick-up or delivery - cash, check, or card accepted)     Able to afford home medications/ co-pay

## 2021-10-18 NOTE — PROGRESS NOTES
Education: Precautions;OT Role;ADL Adaptive Strategies;Transfer Training;Equipment  Patient Education: Increased time spent educating pt about TTB since pt has a tub unit at home. OT showed pt a picture and pt plans to purchase bench. REQUIRES OT FOLLOW UP: Yes  Activity Tolerance  Activity Tolerance: Patient Tolerated treatment well  Safety Devices  Safety Devices in place: Yes  Type of devices: Call light within reach; Chair alarm in place; Left in chair         Patient Diagnosis(es): The primary encounter diagnosis was Muscle weakness of extremity. Diagnoses of Abnormal gait and S/P cervical spinal fusion were also pertinent to this visit. has a past medical history of BPH (benign prostatic hyperplasia). has a past surgical history that includes Cholecystectomy, laparoscopic (2009); cervical fusion (2006); Leg Surgery (2000); and cervical fusion (N/A, 10/15/2021). Restrictions  Position Activity Restriction  Other position/activity restrictions: up as tolerated, Lindon J Cervical collar to be worn when out of bed - Cervical collar does NOT need to be worn when eating, bathing or showering  Subjective   General  Chart Reviewed: Yes  Patient assessed for rehabilitation services?: Yes  Additional Pertinent Hx: 77 y.o. male who presened to the ED with weakness and intermittent numbness to bilateral upper and lower extremities. Pt has fallen 2x. CT head (-) ; XR chest (-). MRI Severe cord compression with edema at C3-4.  10/15 underwent  C3-4 ACDF with posterior screw fixation, C6-7 revision ACDF. 10/15 L knee aspiration    PMHx: cervical fusion 2006  Family / Caregiver Present: No  Referring Practitioner: Leyda Clinton  Diagnosis: abnormal gait, cord compression  Subjective  Subjective: Pt was semi supine in bed upon arrival. Pt was pleasant and agreeable to OT.  Pt stated he hopes he goes home today  Vital Signs  Patient Currently in Pain: Denies   Orientation  Orientation  Overall Orientation Status: Within Functional Limits  Objective    ADL  Grooming: Stand by assistance (Pt combed his hair and completed oral care in stance at sink w/ SBA)  UE Dressing: Setup;Verbal cueing (Pt donned cervical collar seated EOB w/ VCs to tighten straps for improved fit.)  Toileting: Stand by assistance (Pt completed pants management w/ SBA. Pt urinated seated on toilet)          Balance  Sitting Balance: Independent  Standing Balance: Contact guard assistance  Standing Balance  Time: ~12 mins total  Activity: functional mobility to/from bathroom + functional mobility in hallway ~200ft, stance for ADLs  Functional Mobility  Functional - Mobility Device: Rolling Walker  Activity: To/from bathroom; Other (200ft in hallway)  Assist Level: Contact guard assistance  Functional Mobility Comments: Pambulated w/ RW w/ CGA-SBA w/ heavy reliance of BUEs for support. Pt w/ weak ataxic gait w/ LLE significantly weaker than RLE. Pt w/ 2 episodes of L knee buckling however pt recovered w/o OT assistance.   Toilet Transfers  Toilet - Technique: Ambulating  Equipment Used: Standard toilet  Toilet Transfer: Stand by assistance  Toilet Transfers Comments: w/ use of GB on R    Bed mobility  Supine to Sit: Supervision (HOB elevated)  Transfers  Sit to stand: Stand by assistance (bed to RW)  Stand to sit: Stand by assistance                         Cognition  Overall Cognitive Status: [facility]                                         Plan   Plan  Times per week: 5-7  Times per day: Daily  Specific instructions for Next Treatment: awaiting neuro consult for further information regarding pts medical status- recommend performing visual screen next session if seen by OTR/L  Current Treatment Recommendations: Balance Training, Functional Mobility Training, Endurance Training, Self-Care / ADL, Safety Education & Training, Equipment Evaluation, Education, & procurement, Patient/Caregiver Education & Training  G-Code     OutComes Score AM-PAC Score        AM-PAC Inpatient Daily Activity Raw Score: 19 (10/18/21 1026)  AM-PAC Inpatient ADL T-Scale Score : 40.22 (10/18/21 1026)  ADL Inpatient CMS 0-100% Score: 42.8 (10/18/21 1026)  ADL Inpatient CMS G-Code Modifier : CK (10/18/21 1026)    Goals  Short term goals  Time Frame for Short term goals: by discharge  Short term goal 1: mod I LB dressing- not met  Short term goal 2: Pt will ambulate to/from bathroom w/ 2WW and supervision- not met  Short term goal 3: supervision ADL item retrieval and transport at walker level- not met  Patient Goals   Patient goals : not stated       Therapy Time   Individual Concurrent Group Co-treatment   Time In 0907         Time Out 0945         Minutes 38         Timed Code Treatment Minutes: Ricky Velez OT   If patient discharges prior to next treatment, this note will serve as discharge summary. WiIll continue per plan of care if patient does not discharge.

## 2021-10-18 NOTE — PROGRESS NOTES
NEUROSURGERY POST-OP PROGRESS NOTE    Patient Name: Sharad Alexander YOB: 1955   Sex: Male Age: 72 yrs     Medical Record Number: [id number] Acct Number: [de-identified]   Room Number: 5045/6237-63 Hospital Day: Hospital Day: 7     Interval History:  Post-operative Day# 3 s/p C3-4 ACDF with posterior screw fixation, C6-7 revision ACDF    Subjective: Patient up ambulating in halls with PT. He complains of minimal to no pain. Ian Bernardo to discharge home. Objective:    VITAL SIGNS   BP (!) 143/98   Pulse 83   Temp 98 °F (36.7 °C) (Oral)   Resp 16   Ht 5' 7\" (1.702 m)   Wt 174 lb 1.6 oz (79 kg)   SpO2 96%   BMI 27.27 kg/m²    Height Height: 5' 7\" (170.2 cm)   Weight Weight: 174 lb 1.6 oz (79 kg)        Allergies No Known Allergies   NPO Status ADULT DIET; Regular   Isolation No active isolations     LABS   Basic Metabolic Profile Recent Labs     10/16/21  1244 10/17/21  0617 10/18/21  0615   * 136 135*    101 98*   CO2 23 24 24   BUN 25* 24* 24*   CREATININE 1.0 1.0 1.0   GLUCOSE 149* 122* 112*      Complete Blood Count Recent Labs     10/16/21  1244 10/17/21  0617 10/18/21  0615   WBC 17.7* 15.9* 13.5*   RBC 4.29 4.34 4.22      Coagulation Studies No results for input(s): PTT, INR in the last 72 hours. Invalid input(s): PLATELETS, PROA, PT, PTTA     MEDICATIONS   Inpatient Medications   vitamin B-12, 500 mcg, Oral, Daily    sodium chloride flush, 10 mL, IntraVENous, 2 times per day    sennosides-docusate sodium, 1 tablet, Oral, BID   Infusions    sodium chloride 25 mL (10/16/21 2019)      Antibiotics   Recent Abx Admin      No antibiotic orders with administrations found.                  Neurologic Exam:    Mental status: awake/alert, oriented x 4    Musculoskeletal:   Gait: Not tested   Tone: normal  Sensory: improved sensation to LUE, intact to light touch   Motor strength:    Right  Left    Right  Left    Deltoid  5 5  Hip Flex  5 5   Biceps  5 5  Knee Extensors  5 5 Triceps  5 5  Knee Flexors  5 5   Wrist Ext  5 4+  Ankle Dorsiflex. 5 5   Wrist Flex  5 4+  Ankle Plantarflex. 5 5   Handgrip  5 4+  Ext Edmar Longus  5 5   Thumb Ext  5 4+         Incision: c/d/i staples to posterior incision, dermabond to anterior     Respiratory:  Unlabored respiratory pattern    Abdomen:   Soft, ND   Last BM    Cardiovascular:  Warm, well perfused    Assessment   76 yo male POD 3 s/p C3-4 ACDF with posterior screw fixation, C6-7 revision ACDF     Plan:  1. Neurologic exam frequency: q 4   2. Mobility: PT/OT, activity as tolerated   3. Diet: ADAT  4. Hager: removed  5. DVT Prophylaxis: SCDs   6. Bowel Regimen: senokot   7. Pain control: tylenol, roxicodone   8. Incisional Care: cleanse daily with soap/water, pat dry with clean towel and paint with CHG swab  9. Brace: soft cervical collar when OOB   10. Dispo Planning: ok to discharge home from [address] [place] [address] standpoint     Patient was discussed with Dr. Daniel Espinoza who agrees with above assessment and plan. Electronically signed by:  YOKASTA Mascorro NP, 10/18/2021 9:45 AM   Neurosurgery Nurse Practitioner  [phone]

## 2021-11-02 ENCOUNTER — TELEPHONE (OUTPATIENT)
Dept: ORTHOPEDIC SURGERY | Age: 66
End: 2021-11-02

## 2021-11-08 LAB
BODY FLUID CULTURE, STERILE: NORMAL
GRAM STAIN RESULT: NORMAL

## 2021-11-10 NOTE — ANESTHESIA POSTPROCEDURE EVALUATION
Department of Anesthesiology  Postprocedure Note    Patient: Murphy Ferreira  MRN: [id number]  YOB: 1955  Date of evaluation: 11/10/2021  Time:  7:26 AM     Procedure Summary     Date: 10/15/21 Room / Location: [address] [place] [address] [address] [place] [address] / [facility]    Anesthesia Start: 1519 Anesthesia Stop: 1909    Procedure: ANTERIOR CERVICAL DISCECTOMY AND FUSION C3-4 AND C6-7, DECOMPRESSION AND FUSION C3-4 POSTERIOR/ REMOVAL OF PLATES AND LATERAL MASS SCREW (N/A Spine Cervical) Diagnosis: (CERVICAL HERNIATION)    Surgeons: Roberta Beltran MD Responsible Provider: Roldan Farmer MD    Anesthesia Type: general ASA Status: 1          Anesthesia Type: general    Krzysztof Phase I: Krzysztof Score: 8    Krzysztof Phase II:      Last vitals: Reviewed and per EMR flowsheets.        Anesthesia Post Evaluation    Patient location during evaluation: PACU  Patient participation: complete - patient participated  Level of consciousness: awake and lethargic  Airway patency: patent  Nausea & Vomiting: no nausea and no vomiting  Complications: no  Cardiovascular status: hemodynamically stable and blood pressure returned to baseline  Respiratory status: spontaneous ventilation and nasal cannula  Hydration status: stable

## 2021-11-22 ENCOUNTER — OFFICE VISIT (OUTPATIENT)
Dept: ORTHOPEDIC SURGERY | Age: 66
End: 2021-11-22
Payer: COMMERCIAL

## 2021-11-22 ENCOUNTER — TELEPHONE (OUTPATIENT)
Dept: ORTHOPEDIC SURGERY | Age: 66
End: 2021-11-22

## 2021-11-22 VITALS — HEIGHT: 67 IN | BODY MASS INDEX: 27.31 KG/M2 | WEIGHT: 174 LBS

## 2021-11-22 DIAGNOSIS — M17.12 PATELLOFEMORAL ARTHRITIS OF LEFT KNEE: Primary | ICD-10-CM

## 2021-11-22 DIAGNOSIS — M25.562 LEFT KNEE PAIN, UNSPECIFIED CHRONICITY: ICD-10-CM

## 2021-11-22 DIAGNOSIS — M23.8X9: ICD-10-CM

## 2021-11-22 DIAGNOSIS — M23.8X2: ICD-10-CM

## 2021-11-22 PROCEDURE — 99214 OFFICE O/P EST MOD 30 MIN: CPT | Performed by: ORTHOPAEDIC SURGERY

## 2021-11-22 NOTE — PROGRESS NOTES
Chief Complaint  Knee Pain (F/U LEFT KNEE PAIN)      History of Present Illness:  Sade Burger is a pleasant 77 y.o. male here to follow-up on left knee pain related to patellofemoral arthritis. He recently underwent spinal fusion surgery by Dr. Rogelio Madison (360 fusion) at [facility]  When he went to the hospital for that he also had left knee swelling which my physician assistant Ginette Zabala  had aspirated at the time and this had led to some relief. He now has some pain anteriorly with stairs sitting to standing. Medical History:  Patient's medications, allergies, past medical, surgical, social and family histories were reviewed and updated as appropriate. Pain Assessment  Location of Pain: Knee  Location Modifiers: Left  Severity of Pain: 2  Quality of Pain: Aching  Frequency of Pain: Intermittent  Aggravating Factors: Stairs (SITTING TO STANDING, UP STAIRS)  Limiting Behavior: Some  Relieving Factors: Ice  Result of Injury: No  Work-Related Injury: No  Are there other pain locations you wish to document?: No  ROS: Review of systems reviewed from Patient History Form completed today and available in the patient's chart under the Media tab. Pertinent items are noted in HPI  Review of systems reviewed from Patient History Form completed today and available in the patient's chart under the Media tab. Vital Signs:  Ht 5' 7\" (1.702 m)   Wt 174 lb (78.9 kg)   BMI 27.25 kg/m²         Neuro: Alert & oriented x 3,  normal,  no focal deficits noted. Normal affect. Eyes: sclera clear  Ears: Normal external ear  Mouth:  No perioral lesions  Pulm: Respirations unlabored and regular  Pulse: Extremities well perfused. 2+ peripheral pulses. Skin: Warm. No ulcerations. Constitutional: The physical examination finds the patient to be well-developed and well-nourished. The patient is alert and oriented x3 and was cooperative throughout the visit.       LEFT knee exam    Gait: No use of assistive devices. No antalgic gait. Alignment: normal alignment. Inspection/skin: Skin is intact without erythema or ecchymosis. No gross deformity. Palpation: mild crepitus. + medial joint line tenderness present. Range of Motion: There is full range of motion. Strength: Normal quadriceps development. Effusion: mild effusion      Ligamentous stability: No cruciate. + LCL collateral ligament instability and MCL pseudolaxity. Neurologic and vascular: Skin is warm and well-perfused. Sensation is intact to light-touch. Special tests: Negative Tala sign. Radiology:     No new xrays    Assessment: Patient is a 77 y.o. male  With re-aggravation of previously improving left knee pain and swelling related to severe varus osteoarthritis     Impression:  Visit Diagnoses       Codes    Patellofemoral arthritis of left knee    -  Primary M17.12    Left knee pain, unspecified chronicity     M25.562          Office Procedures:  No orders of the defined types were placed in this encounter. Orders Placed This Encounter   Procedures    DUROLANE INJECTION (For Auth/Precert)     Standing Status:   Future     Standing Expiration Date:   11/22/2022    DJO OTS Fullforce Functional Knee Brace     Patient was prescribed a DJO Fullforce Functional Knee Brace for their left knee. The patient has weakness and instability of their left knee which requires stabilization from this semi-rigid / rigid orthosis to improve their function. The orthosis will assist in protecting the affected area while providing functional support for activities of daily living. The patient was measured and educated regarding the device on 11/22/2021 and will be fit with the device on or after 11/29/2021 based on the insurance verification process. Verbal and written instructions for the use and application of this item were provided.   The patient was educated and fit by a healthcare professional with expert knowledge

## 2021-11-23 NOTE — TELEPHONE ENCOUNTER
Patient has been approved for visco injection of knee and is scheduled for follow up on 1/6/21. Injection can be administered at that time.

## 2021-12-03 NOTE — TELEPHONE ENCOUNTER
PATIENT IS WANTING TO START PT. PLEASE ADVISE.       PATIENT IS ALSO SWITCHING TO MEDICARE INSURANCE AND IS CHECKING TO SEE IF THE INJECTIONS WERE APPROVED THROUGH THAT INSURANCE

## 2021-12-08 ENCOUNTER — TELEPHONE (OUTPATIENT)
Dept: ORTHOPEDIC SURGERY | Age: 66
End: 2021-12-08

## 2021-12-20 ENCOUNTER — OFFICE VISIT (OUTPATIENT)
Dept: ORTHOPEDIC SURGERY | Age: 66
End: 2021-12-20
Payer: COMMERCIAL

## 2021-12-20 VITALS — HEIGHT: 67 IN | WEIGHT: 184 LBS | BODY MASS INDEX: 28.88 KG/M2

## 2021-12-20 DIAGNOSIS — M17.12 PATELLOFEMORAL ARTHRITIS OF LEFT KNEE: Primary | ICD-10-CM

## 2021-12-20 PROCEDURE — 99214 OFFICE O/P EST MOD 30 MIN: CPT | Performed by: ORTHOPAEDIC SURGERY

## 2021-12-20 PROCEDURE — 20611 DRAIN/INJ JOINT/BURSA W/US: CPT | Performed by: ORTHOPAEDIC SURGERY

## 2021-12-20 RX ORDER — MULTIVIT WITH MINERALS/LUTEIN
250 TABLET ORAL DAILY
COMMUNITY

## 2021-12-20 RX ORDER — DICLOFENAC SODIUM 75 MG/1
75 TABLET, DELAYED RELEASE ORAL 2 TIMES DAILY
Qty: 60 TABLET | Refills: 0 | Status: CANCELLED | OUTPATIENT
Start: 2021-12-20 | End: 2022-01-19

## 2021-12-20 RX ORDER — LIDOCAINE HYDROCHLORIDE 10 MG/ML
20 INJECTION, SOLUTION INFILTRATION; PERINEURAL ONCE
Status: COMPLETED | OUTPATIENT
Start: 2021-12-20 | End: 2021-12-20

## 2021-12-20 RX ORDER — OMEGA-3S/DHA/EPA/FISH OIL/D3 300MG-1000
400 CAPSULE ORAL DAILY
COMMUNITY

## 2021-12-20 RX ADMIN — LIDOCAINE HYDROCHLORIDE 20 ML: 10 INJECTION, SOLUTION INFILTRATION; PERINEURAL at 11:54

## 2021-12-20 NOTE — PROGRESS NOTES
Chief Complaint  Knee Pain (F/U LEFT KNEE PAIN)      History of Present Illness:  Mark Levi is a pleasant 77 y.o. male with left knee varus OA who is here for his prescheduled Durolane injection. He essentially has mechanical symptoms when getting up from a seated position causing stiffness. He has found the custom off  brace, to be helpful in walking. No other setbacks. He is post op cervical fusion surgery. Medical History:  Patient's medications, allergies, past medical, surgical, social and family histories were reviewed and updated as appropriate. Pain Assessment  Location of Pain: Knee  Location Modifiers: Left  Severity of Pain: 4  Quality of Pain: Aching  Frequency of Pain: Intermittent  Aggravating Factors:  (LYING DOWN TO STANDING. PROLONG SITTING TO STANDING)  Limiting Behavior: Yes  Relieving Factors:  (STRETCHING)  Result of Injury: No  Work-Related Injury: No  Are there other pain locations you wish to document?: No  ROS: Review of systems reviewed from Patient History Form completed today and available in the patient's chart under the Media tab. Pertinent items are noted in HPI  Review of systems reviewed from Patient History Form completed today and available in the patient's chart under the Media tab. Vital Signs:  Ht 5' 7\" (1.702 m)   Wt 184 lb (83.5 kg)   BMI 28.82 kg/m²         Neuro: Alert & oriented x 3,  normal,  no focal deficits noted. Normal affect. Eyes: sclera clear  Ears: Normal external ear  Mouth:  No perioral lesions  Pulm: Respirations unlabored and regular  Pulse: Extremities well perfused. 2+ peripheral pulses. Skin: Warm. No ulcerations. Constitutional: The physical examination finds the patient to be well-developed and well-nourished. The patient is alert and oriented x3 and was cooperative throughout the visit. LEFT knee exam    Gait: No use of assistive devices. No antalgic gait.     Alignment: normal alignment. Inspection/skin: Skin is intact without erythema or ecchymosis. No gross deformity. Palpation: mild crepitus. Mild medial + joint line tenderness present. Range of Motion: There is full range of motion. Strength: Normal quadriceps development. Effusion: moderate to large effusion  present. Ligamentous stability: No cruciate or collateral ligament instability. Neurologic and vascular: Skin is warm and well-perfused. Sensation is intact to light-touch. Special tests: Negative Tala sign. Radiology:     No new imaging  Varus OA     Assessment: Patient is a 77 y.o. male left knee pain, stiffness, and swelling related to varus OA. He is a candidate for a left knee aspiration followed by Durolane injection today    Impression:  Visit Diagnoses       Codes    Patellofemoral arthritis of left knee    -  Primary M17.12          Office Procedures:  Orders Placed This Encounter   Medications    sodium hyaluronate (DUROLANE) injection PRSY 60 mg    lidocaine 1 % injection 20 mL     Orders Placed This Encounter   Procedures    US GUIDED NEEDLE PLACEMENT     Standing Status:   Future     Number of Occurrences:   1     Standing Expiration Date:   12/20/2022    [place] ARTHROCENTESIS ASPIR&/INJ MAJOR JT/BURSA W/O US       Plan:  The patient was given the option of performing today's injection using ultrasound guidance. We discussed the pros and cons of using the ultrasound for guidance. The patient chose to proceed, and today's injection was performed using Logic E ultrasound unit with a variable frequency (10 MHz) linear transducer for localization and needle placement. The image was saved for the medical record. Procedure: The indications and risks of a knee aspiration  as well as treatment alternatives were discussed with the patient who consented to the procedure.  Under sterile conditions and after informed consent was obtained the patient was given an injection into the LEFT Knee. 10 mL of 0.5% ropivacaine (Naropin) were placed subcutanesouly into the aspiration site, after the knee  was prepped with chlorhexidine. Thereafter, an 18 needle with a 20 cc gauge syringe was advanced into the suprapatellar pouch from superolateral, yielding approximately 80cc of clear coloured joint fluid. This resulted in good relief of symptoms. The risks benefits and alternatives to a Durolane injection were discussed with the patient. The patient has undergone treatment with physical therapy anti-inflammatory medication and steroid injection in the past and has been unresponsive. X-rays confirm that there is significant osteoarthritis. After informed consent was obtained, the injection site was prepped with chlorhexidine following which the skin was anesthetized with ethyl chloride. The injection site was then prepared and 3 mL (20 mg/ml) of Durolane was placed into the LEFT knee without complication. The patient was able to flex the knee to 90° immediately after the injection. The patient was advised to take it easy the next few days and ice and if there is any soreness. The patient was advised to contact us if any swelling, redness or increasing pain develops. All questions were answered and the patient will see me for followup on as-needed basis. Patient suffered a brief vasovagal reaction after the injection which has cccurred to him before. He quickly responded to a cold compress and being placed in safety position with no ensuing chest pain or shortness of breath. We monitored him for 30 minutes after the injection. He was comfortable and cleared to go home. The patient was advised to ice the knee this evening and to avoid vigorous activities for the next 2 days. They were advised to call us if there was any erythema, enduration, swelling or increasing pain. I will defer prolonged use of NSAIDs to his spine surgeon given his recent cervical fusion surgery.     All the patient's questions were answered while in the clinic. The patient is understanding of all instructions and agrees with the plan. Approximately 45 minutes was spent on patient education and coordinating care. Follow up in: Return in about 3 months (around 3/20/2022). Sincerely,    Maryan Broderick MD [address] [facility]   [place] [address] [place] [address], [id number]  Email: [email or web address]  Office: [phone]    12/20/21  12:42 PM    The encounter with Army Castillo was carried out by myself, Dr Elisabeth Goltz, who personally examined the patient and reviewed the plan. This dictation was performed with a verbal recognition program (DRAGON) and it was checked for errors. It is possible that there are still dictated errors within this office note. If so, please bring any errors to my attention for an addendum. All efforts were made to ensure that this office note is accurate.

## 2021-12-20 NOTE — PROGRESS NOTES
12/20/21  11:38 AM        NDC: 7005-2153-48   -  Lidocaine 1.0 %    LOT: [id number]    COMMENT: LEFT KNEE DUROLANE INJECTION

## 2022-03-21 ENCOUNTER — OFFICE VISIT (OUTPATIENT)
Dept: ORTHOPEDIC SURGERY | Age: 67
End: 2022-03-21
Payer: MEDICARE

## 2022-03-21 VITALS — BODY MASS INDEX: 29.03 KG/M2 | WEIGHT: 185 LBS | HEIGHT: 67 IN

## 2022-03-21 DIAGNOSIS — M17.12 PATELLOFEMORAL ARTHRITIS OF LEFT KNEE: Primary | ICD-10-CM

## 2022-03-21 PROCEDURE — 20610 DRAIN/INJ JOINT/BURSA W/O US: CPT | Performed by: ORTHOPAEDIC SURGERY

## 2022-03-21 RX ORDER — LIDOCAINE HYDROCHLORIDE 10 MG/ML
20 INJECTION, SOLUTION INFILTRATION; PERINEURAL ONCE
Status: COMPLETED | OUTPATIENT
Start: 2022-03-21 | End: 2022-03-21

## 2022-03-21 RX ORDER — ROPIVACAINE HYDROCHLORIDE 5 MG/ML
30 INJECTION, SOLUTION EPIDURAL; INFILTRATION; PERINEURAL ONCE
Status: COMPLETED | OUTPATIENT
Start: 2022-03-21 | End: 2022-03-21

## 2022-03-21 RX ORDER — METHYLPREDNISOLONE ACETATE 40 MG/ML
40 INJECTION, SUSPENSION INTRA-ARTICULAR; INTRALESIONAL; INTRAMUSCULAR; SOFT TISSUE ONCE
Status: COMPLETED | OUTPATIENT
Start: 2022-03-21 | End: 2022-03-21

## 2022-03-21 RX ADMIN — ROPIVACAINE HYDROCHLORIDE 30 ML: 5 INJECTION, SOLUTION EPIDURAL; INFILTRATION; PERINEURAL at 11:09

## 2022-03-21 RX ADMIN — METHYLPREDNISOLONE ACETATE 40 MG: 40 INJECTION, SUSPENSION INTRA-ARTICULAR; INTRALESIONAL; INTRAMUSCULAR; SOFT TISSUE at 11:09

## 2022-03-21 RX ADMIN — LIDOCAINE HYDROCHLORIDE 20 ML: 10 INJECTION, SOLUTION INFILTRATION; PERINEURAL at 11:08

## 2022-03-21 NOTE — PROGRESS NOTES
Chief Complaint  Knee Pain (F/U LEFT KNEE)      History of Present Illness:  Laila Lozoya is a pleasant 77 y.o. male who is here for 3-month follow-up post Durolane injection into his left knee for advanced varus tricompartmental osteoarthritis. He is also still recovering from cervical spine surgery done few months ago at [facility]. He is doing better but still has daily morning stiffness and locking symptoms are getting up from a bent knee position. He has some occasional giving way. He has been using an off  brace over-the-counter. He had some relief to the Durolane. No other setbacks. He was wondering about an MRI. Medical History:  Patient's medications, allergies, past medical, surgical, social and family histories were reviewed and updated as appropriate. Pain Assessment  Location of Pain: Knee  Location Modifiers: Left  Severity of Pain: 3  Quality of Pain: Aching  Frequency of Pain: Intermittent  Aggravating Factors:  (MORNING AND LATE EVENING)  Limiting Behavior: Yes  Relieving Factors: Heat  Result of Injury: No  Work-Related Injury: No  Are there other pain locations you wish to document?: No  ROS: Review of systems reviewed from Patient History Form completed today and available in the patient's chart under the Media tab. Pertinent items are noted in HPI  Review of systems reviewed from Patient History Form completed today and available in the patient's chart under the Media tab. Vital Signs:  Ht 5' 7\" (1.702 m)   Wt 185 lb (83.9 kg)   BMI 28.98 kg/m²         Neuro: Alert & oriented x 3,  normal,  no focal deficits noted. Normal affect. Eyes: sclera clear  Ears: Normal external ear  Mouth:  No perioral lesions  Pulm: Respirations unlabored and regular  Pulse: Extremities well perfused. 2+ peripheral pulses. Skin: Warm. No ulcerations. Constitutional: The physical examination finds the patient to be well-developed and well-nourished.   The patient is alert and oriented x3 and was cooperative throughout the visit. LEFT knee exam    Gait: No use of assistive devices. No antalgic gait. Alignment: normal alignment. Inspection/skin: Skin is intact without erythema or ecchymosis. No gross deformity. Palpation: mild crepitus. +medial joint line tenderness present. Range of Motion: There is nearly  full range of motion. Strength: Normal quadriceps development. Effusion: large effusion swelling present. Ligamentous stability: No cruciate or collateral ligament instability. Neurologic and vascular: Skin is warm and well-perfused. Sensation is intact to light-touch. Special tests: Negative Tala sign. Radiology:       Old xrays reviewed and showed advanced medial compartment PF compartment narrowing. Assessment: Patient is a 77 y.o. male with recurrent left knee pain, stiffness swelling and mechanical symptoms from advanced varus/PF OA. Impression:  Visit Diagnoses       Codes    Patellofemoral arthritis of left knee    -  Primary M17.12          Office Procedures:  Orders Placed This Encounter   Medications    lidocaine 1 % injection 20 mL    ropivacaine (NAROPIN) 0.5% injection 30 mL    methylPREDNISolone acetate (DEPO-MEDROL) injection 40 mg    hyaluronate (MONOVISC) injection 88 mg     Orders Placed This Encounter   Procedures    US GUIDED NEEDLE PLACEMENT     Standing Status:   Future     Number of Occurrences:   1     Standing Expiration Date:   3/21/2023    [place] ARTHROCENTESIS ASPIR&/INJ MAJOR JT/BURSA W/O US       Plan:  We believe patient is a candidate for repeat aspirate and viscosupplementation/cortisone injection today. We discussed possible referral to TKA specialist, and will do this if no improvement to this round of injections. We also discussed the merits of an MRI.  At this point I suggested the xrays shows advanced OA which provides sufficient radiographic information to explain his clinical presentation in the knee. The patient was given the option of performing today's injection using ultrasound guidance. We discussed the pros and cons of using the ultrasound for guidance. The patient chose to proceed, and today's injection was performed using Logic E ultrasound unit with a variable frequency (10 MHz) linear transducer for localization and needle placement. The image was saved for the medical record. Procedure (left knee aspiration): The indications and risks of a knee aspiration  as well as treatment alternatives were discussed with the patient who consented to the procedure. Under sterile conditions and after informed consent was obtained the patient was given an injection into the LEFT Knee. 10 mL of 0.5% ropivacaine (Naropin) were placed subcutanesouly into the aspiration site, after the knee  was prepped with chlorhexidine. Thereafter, an 18 needle with a 20 cc gauge syringe was advanced into the suprapatellar pouch from superolateral, yielding approximately 80cc of clear coloured joint fluid. This resulted in good relief of symptoms. There were no complications. Procedure(cortisone injection): The indications and risks of steroid injection as well as treatment alternatives were discussed with the patient who consented to the procedure. Under sterile conditions and after informed consent was obtained the patient was given an injection into the LEFT knee. 2cc 40 mg of Depo-Medroland 4 mL of 0.5% ropivacaine (Naropin) were placed in the knee after it was prepped with chlorhexidine. This resulted in good relief of symptoms. There were no complications. The patient was advised to ice the knee this evening and to avoid vigorous activities for the next 2 days. They were advised to call us if there was any erythema, enduration, swelling or increasing pain. Procedure(monovisc injection): The risks benefits and alternatives to Monovisc injection were discussed with the patient. The patient has undergone treatment with physical therapy anti-inflammatory medication and steroid injection in the past and has been unresponsive. X-rays confirm that there is significant osteoarthritis. After informed consent was obtained, the injection site was prepped with chlorhexidine following which the skin was anesthetized with ethyl chloride. The injection site was then prepared with 4 mL of 1% lidocaine following which 4cc (22mg) of Monovisc was placed into the LEFT knee without complication. The patient was able to flex the knee to 90° immediately after the injection. The patient was advised to take it easy the next few days and ice and if there is any soreness. The patient was advised to contact us if any swelling, redness or increasing pain develops. All questions were answered and the patient will see me for followup on as-needed basis. The postinjection information sheet was provided. The patient was advised to ice the knee this evening and to avoid vigorous activities for the next 2 days. They were advised to call us if there was any erythema, enduration, swelling or increasing pain. All the patient's questions were answered while in the clinic. The patient is understanding of all instructions and agrees with the plan. Approximately 45 minutes was spent on patient education and coordinating care. Follow up in: Return in about 4 weeks (around 4/18/2022). Sincerely,    Porsha Treadwell MD [facility] [address]  Email: [email or web address]  Office: [phone]    03/21/22  1:01 PM    The encounter with Torresvanessa Sidney was carried out by myself, Dr Misty Cerna, who personally examined the patient and reviewed the plan. This dictation was performed with a verbal recognition program (DRAGON) and it was checked for errors. It is possible that there are still dictated errors within this office note. If so, please bring any errors to my attention for an addendum. All efforts were made to ensure that this office note is accurate.

## 2022-03-21 NOTE — PROGRESS NOTES
3/21/22  10:55 AM        NDC: 69473-285-23   -   Ropivacaine 0.5 %    LOT: [id number]    COMMENT: LEFT KNEE ASPIRATION, CORTISONE INJECTION + MONOVISC INJECTION      NDC: 7213-4640-85   -   Lidocaine 1.0 %    LOT: [id number]    COMMENT: LEFT KNEE ASPIRATION, CORTISONE INJECTION + MONOVISC INJECTION

## 2022-03-21 NOTE — LETTER
[facility]  [address] [address]  Phone: [phone]  Fax: [phone]    Louis Barron MD    March 21, 2022     Siri Ko [address] [facility] [address]    Patient: Melina Kowalski   MR Number: [id number]   YOB: 1955   Date of Visit: 3/21/2022       Dear Siri Ko:    Thank you for referring Kecia Martinez to me for evaluation/treatment. Below are the relevant portions of my assessment and plan of care. If you have questions, please do not hesitate to call me. I look forward to following Antoinette Camargo along with you.     Sincerely,      Louis Barron MD

## 2022-04-18 ENCOUNTER — OFFICE VISIT (OUTPATIENT)
Dept: ORTHOPEDIC SURGERY | Age: 67
End: 2022-04-18
Payer: MEDICARE

## 2022-04-18 VITALS — WEIGHT: 185 LBS | HEIGHT: 67 IN | BODY MASS INDEX: 29.03 KG/M2

## 2022-04-18 DIAGNOSIS — M17.12 PATELLOFEMORAL ARTHRITIS OF LEFT KNEE: Primary | ICD-10-CM

## 2022-04-18 DIAGNOSIS — M17.12 PRIMARY OSTEOARTHRITIS OF LEFT KNEE: ICD-10-CM

## 2022-04-18 PROCEDURE — 99214 OFFICE O/P EST MOD 30 MIN: CPT | Performed by: ORTHOPAEDIC SURGERY

## 2022-04-18 PROCEDURE — 3017F COLORECTAL CA SCREEN DOC REV: CPT | Performed by: ORTHOPAEDIC SURGERY

## 2022-04-18 PROCEDURE — 4040F PNEUMOC VAC/ADMIN/RCVD: CPT | Performed by: ORTHOPAEDIC SURGERY

## 2022-04-18 PROCEDURE — 1036F TOBACCO NON-USER: CPT | Performed by: ORTHOPAEDIC SURGERY

## 2022-04-18 PROCEDURE — G8417 CALC BMI ABV UP PARAM F/U: HCPCS | Performed by: ORTHOPAEDIC SURGERY

## 2022-04-18 PROCEDURE — 1123F ACP DISCUSS/DSCN MKR DOCD: CPT | Performed by: ORTHOPAEDIC SURGERY

## 2022-04-18 PROCEDURE — G8427 DOCREV CUR MEDS BY ELIG CLIN: HCPCS | Performed by: ORTHOPAEDIC SURGERY

## 2022-04-18 NOTE — LETTER
[facility]  [address] [place] [address]  Phone: [phone]  Fax: [phone]    Jaguar Bustamante MD    April 18, 2022     Rodolfo Marks [address] [facility] [address] [facility] [address]    Patient: Mick Delong   MR Number: [id number]   YOB: 1955   Date of Visit: 4/18/2022       Dear Avi Diaz:    Thank you for referring Gustavo Mahoney to me for evaluation/treatment. Below are the relevant portions of my assessment and plan of care. If you have questions, please do not hesitate to call me. I look forward to following Kathrin Hughes along with you.     Sincerely,      Jaguar Bustamante MD

## 2022-04-18 NOTE — PROGRESS NOTES
Chief Complaint  Knee Pain (F/U LEFT KNEE)      History of Present Illness:  Chris Saldana is a pleasant 77 y.o. male who is here for 1month reassessment post left knee aspiration and cortisone/Durolane injection. unfortunately ii provided him with zero relief. He still has 3 out of sharp catching discomfort which is worse with standing and walking. His biggest complaint is actually knee instability. This is despite the knee brace that we prescribed him. He notes that when he goes to the gym he actually has no complaints with any strengthening or sitting however it is upon walking and has some mechanical  symptoms or giving way. Medical History:  Patient's medications, allergies, past medical, surgical, social and family histories were reviewed and updated as appropriate. Pain Assessment  Location of Pain: Knee  Location Modifiers: Left  Severity of Pain: 3  Quality of Pain: Sharp (CATCHING)  Frequency of Pain: Intermittent  Aggravating Factors:  (SITTING TO STANDING)  Limiting Behavior: Yes  Result of Injury: No  Work-Related Injury: No  Are there other pain locations you wish to document?: No  ROS: Review of systems reviewed from Patient History Form completed today and available in the patient's chart under the Media tab. Pertinent items are noted in HPI  Review of systems reviewed from Patient History Form completed today and available in the patient's chart under the Media tab. Vital Signs:  Ht 5' 7\" (1.702 m)   Wt 185 lb (83.9 kg)   BMI 28.98 kg/m²         Neuro: Alert & oriented x 3,  normal,  no focal deficits noted. Normal affect. Eyes: sclera clear  Ears: Normal external ear  Mouth:  No perioral lesions  Pulm: Respirations unlabored and regular  Pulse: Extremities well perfused. 2+ peripheral pulses. Skin: Warm. No ulcerations. Constitutional: The physical examination finds the patient to be well-developed and well-nourished.   The patient is alert and oriented x3 and was cooperative throughout the visit. LEFT knee exam    Gait: No use of assistive devices. No antalgic gait. Alignment: normal alignment. Inspection/skin: Skin is intact without erythema or ecchymosis. No gross deformity. Palpation: mild crepitus. +medial joint line tenderness present. Range of Motion: There is nearly  full range of motion. Strength: Normal quadriceps development. Effusion: Trace effusion swelling present, less than last time. Ligamentous stability: No cruciate or collateral ligament instability. Neurologic and vascular: Skin is warm and well-perfused. Sensation is intact to light-touch. Special tests: Negative Tala sign. Radiology:       Old xrays reviewed and showed advanced medial compartment PF compartment narrowing. Assessment: Patient is a 77 y.o. male with recurrent left knee pain, stiffness swelling and mechanical symptoms from advanced varus/PF OA. This is persisted despite an aspiration and combined cortisone/steroid injection into his left knee 1 month ago. Impression:  Visit Diagnoses       Codes    Patellofemoral arthritis of left knee    -  Primary M17.12    Primary osteoarthritis of left knee     M17.12          Office Procedures:  No orders of the defined types were placed in this encounter. Orders Placed This Encounter   Procedures    Ambulatory referral to Physical Therapy     Referral Priority:   Routine     Referral Type:   Eval and Treat     Referral Reason:   Specialty Services Required     Requested Specialty:   Physical Therapy     Number of Visits Requested:   1       Plan:  We believe patient is a candidate for continued nonoperative management, and formal physical therapy for knee strengthening and flexibility. We have given him a functional brace recently  however given his advanced varus OA I do think he would benefit from an medial off  brace.   Referral for physical therapy was sent to the    By epic.  We will have Gustavo Hurl out of our [facility] office to touch base with him regarding the brace. I will see him back in 1 month. We discussed possible referral to TKA specialist, and will do this if no improvement to this round of injections. We also discussed the merits of an MRI. At this point I suggested the xrays shows advanced OA which provides sufficient radiographic information to explain his clinical presentation in the knee. All the patient's questions were answered while in the clinic. The patient is understanding of all instructions and agrees with the plan. Approximately 30 minutes was spent on patient education and coordinating care. Follow up in: No follow-ups on file. Sincerely,    Fallon Zhang MD [address] [facility] [address] [place] [address], [id number]  Email: [email or web address]  Office: [phone]    04/18/22  5:04 PM    The encounter with Chris Saldana was carried out by myself, Dr Ez Anton, who personally examined the patient and reviewed the plan. This dictation was performed with a verbal recognition program (DRAGON) and it was checked for errors. It is possible that there are still dictated errors within this office note. If so, please bring any errors to my attention for an addendum. All efforts were made to ensure that this office note is accurate.

## 2022-04-19 DIAGNOSIS — M17.12 PRIMARY OSTEOARTHRITIS OF LEFT KNEE: Primary | ICD-10-CM

## 2022-04-21 ENCOUNTER — TELEPHONE (OUTPATIENT)
Dept: ORTHOPEDIC SURGERY | Age: 67
End: 2022-04-21

## 2022-04-21 NOTE — TELEPHONE ENCOUNTER
Other PATIENT CALLED STATES THAT HE IS WAITING ON A CALL REGARDING A BRACE. SAID SOMEONE WAS CHECKING ON HIS [employer].  [facility] CALL TO ADVISE [phone]

## 2022-04-22 ENCOUNTER — HOSPITAL ENCOUNTER (OUTPATIENT)
Dept: PHYSICAL THERAPY | Age: 67
Setting detail: THERAPIES SERIES
Discharge: HOME | End: 2022-04-22
Payer: MEDICARE

## 2022-04-22 PROCEDURE — 97110 THERAPEUTIC EXERCISES: CPT

## 2022-04-22 PROCEDURE — 97161 PT EVAL LOW COMPLEX 20 MIN: CPT

## 2022-04-22 NOTE — PLAN OF CARE
[address] [address], [address]  Phone: [phone]   Fax: [phone]                                                       Physical Therapy Certification    Dear  Jessica Holman MD    We had the pleasure of evaluating the following patient for physical therapy services at [address] [place] [address]. A summary of our findings can be found in the initial assessment below. This includes our plan of care. If you have any questions or concerns regarding these findings, please do not hesitate to contact me at the office phone number checked above. Thank you for the referral.       Physician Signature:_______________________________Date:__________________  By signing above (or electronic signature), therapists plan is approved by physician      Patient: Jordon Henning   : 1955   MRN: [id number]  Referring Physician:  Jessica Holman MD      Evaluation Date: 2022      Medical Diagnosis Information:  Diagnosis: M17.12 - Patellofemoral arthritis of left knee   Treatment Diagnosis: L knee pain, hip weakness, decreased quad flexibility, impaired gait                                         Insurance information: PT Insurance Information: MEDICARE      Precautions/ Contra-indications: None   Latex Allergy:  [x]NO      []YES  Preferred Language for Healthcare:   [x]English       []Other:    C-SSRS Triggered by Intake questionnaire (Past 2 wk assessment ):   [x] No, Questionnaire did not trigger screening.   [] Yes, Patient intake triggered C-SSRS Screening     [] Completed, no further action required. [] Completed, PCP notified via Epic    SUBJECTIVE: Patient stated complaint: Pt has had PT for L knee pain prior to this episode. He is currently wearing a brace but is still waiting to hear back about a brace that helps with the varus forces.  Pt states on 10/15/21 he had neck surgery for a severley compressed cervical disc and he still does not have total feeling in his arms, hands, and thighs. Pt states the cervical surgeon is hoping the nerves will regenerate. When he walks he occasionally feels the knees might buckle. Pt states he has joined [employer] and has been lifting weights without difficulty. Has had 2 cortisone injections and gel injections, has had fluid taken off multiple times. Crawling or kneeling does not bother his knee. He only has pain with walking. Relevant Medical History: See below   Functional Outcome: FOTO physical FS primary measure score = 63; Risk adjusted = 61    Pain Scale: 3/10  Easing factors: using hot water from shower on hips   Provocative factors: walking    Type: []Constant   [x]Intermittent  []Radiating []Localized []Other:     Numbness/Tingling: Yes    Occupation/School:      Living Status/Prior Level of Function:Prior to this injury / incident, pt was independent with ADLs and IADLs. Has 2 sets of 5 steps to get to house, 2nd set has B HR.        OBJECTIVE:   Functional Mobility/Transfers: Independent     Gait: Ambulates with L knee flexed to avoid hyperextension, leading to lateral trunk lean and decreased gait speed    Dermatomes Normal Abnormal Comments   inguinal area (L1)       anterior mid-thigh (L2) X     distal ant thigh/med knee (L3)      medial lower leg and foot (L4)      lateral lower leg and foot (L5)      posterior calf (S1)      medial calcaneus (S2)           AROM    L R   Hip Flexion     Hip Abduction     Hip ER     Hip IR     Knee Flexion 122  135    Knee Extension +3 hyperextend +3 hyperextend    Dorsiflexion  8 deg  10 deg    Plantarflexion      Inversion      Eversion      *L ankle resting position is 10 deg inversion    Strength (0-5) / Myotomes Left Right   Hip Flexion - supine 4- 4-   Hip Flexion - seated (L1-2)     Hip Abduction 3 4-   Hip Extension 3 3   Hip ER     Hip IR     Quads (L2-4) 4- 5   Hamstrings     Ankle Dorsiflexion (L4-5)     Ankle Plantarflexion (S1-2)     Ankle Inversion     Ankle Eversion (S1-2)     Great Toe Extension (L5)          Flexibility     Hamstrings (90/90) Lack 20  Lack <10   ITB (Shan)     Quads (Ely's) 112 122   Hip Flexor (Stuart)     Piriformis  Mild limititaion Mod limitation     Balance: unable to complete heel or toe walking without compensation    Stairs: able to climb 3 x 6 inch steps with 1 HR, reciprocal pattern but decreased eccentric quad control with descent                        [x] Patient history, allergies, meds reviewed. Medical chart reviewed. See intake form. Review Of Systems (ROS):  [x]Performed Review of systems (Integumentary, CardioPulmonary, Neurological) by intake and observation. Intake form has been scanned into medical record. Patient has been instructed to contact their primary care physician regarding ROS issues if not already being addressed at this time.       Co-morbidities/Complexities (which will affect course of rehabilitation):   []None        []Hx of COVID   Arthritic conditions   []Rheumatoid arthritis (M05.9)  []Osteoarthritis (M19.91)  []Gout   Cardiovascular conditions   []Hypertension (I10)  []Hyperlipidemia (E78.5)  []Angina pectoris (I20)  []Atherosclerosis (I70)  []Pacemaker  []Hx of CABG/stent/  cardiac surgeries   Musculoskeletal conditions   []Disc pathology   []Congenital spine pathologies   []Osteoporosis (M81.8)  []Osteopenia (M85.8)  []Scoliosis       Endocrine conditions   []Hypothyroid (E03.9)  []Hyperthyroid Gastrointestinal conditions   []Constipation (L41.03)   Metabolic conditions   []Morbid obesity (E66.01)  []Diabetes type 1(E10.65) or 2 (E11.65)   []Neuropathy (G60.9)     Cardio/Pulmonary conditions   []Asthma (J45)  []Coughing   []COPD (J44.9)  []CHF  []A-fib   Psychological Disorders  []Anxiety (F41.9)  []Depression (F32.9)   []Other:   Developmental Disorders  []Autism (F84.0)  []CP (G80)  []Down Syndrome (Q90.9)  []Developmental delay     Neurological conditions  []Prior Stroke (I69.30)  []Parkinson's (G20)  []Encephalopathy (G93.40)  []MS (G35)  []Post-polio (G14)  []SCI  []TBI  []ALS Other conditions  []Fibromyalgia (M79.7)  []Vertigo  []Syncope  []Kidney Failure  []Cancer      []currently undergoing                treatment  []Pregnancy  []Incontinence   Prior surgeries  []involved limb  [x]previous spinal surgery  [] section birth  []hysterectomy  []bowel / bladder surgery  []other relevant surgeries   [x]Other:  Gall bladder removal , cervical fusion 10/15/2021 (C3-4, C6-7), cervical fusion             Barriers to/and or personal factors that will affect rehab potential:              [x]Age  []Sex    []Smoker              []Motivation/Lack of Motivation                        []Co-Morbidities              []Cognitive Function, education/learning barriers              []Environmental, home barriers              []profession/work barriers  [x]past PT/medical experience  []other:      Falls Risk Assessment (30 days): Did not have a hx of falling prior to the neck surgery. Occasionally will lose balance, especially if walking backwards  [x] Falls Risk assessed and no intervention required. [] Falls Risk assessed and Patient requires intervention due to being higher risk   TUG score (>12s at risk):     [] Falls education provided, including        ASSESSMENT: Cleopatra Ramirez is a 77 y.o. male presenting to physical therapy with L knee pian. Pt demonstrates L knee pain, hip weakness, and decreased quad flexibility. The patient's impairments are limiting him from returning to work currently at [employer], in which he needs to be able to make quick transitions. The patient also demonstrates impaired gait which is occasionally causing him hip pain. Pt would benefit from skilled PT to address these deficits and improve function so pt can return to work.      Functional []Excellent   [x]Good    []Fair   []Poor    Tolerance of evaluation/treatment:    []Excellent   [x]Good    []Fair   []Poor    Physical Therapy Evaluation Complexity Justification  [x] A history of present problem with:  [] no personal factors and/or comorbidities that impact the plan of care;  [x]1-2 personal factors and/or comorbidities that impact the plan of care  []3 personal factors and/or comorbidities that impact the plan of care  [x] An examination of body systems using standardized tests and measures addressing any of the following: body structures and functions (impairments), activity limitations, and/or participation restrictions;:  [] a total of 1-2 or more elements   [x] a total of 3 or more elements   [] a total of 4 or more elements   [x] A clinical presentation with:  [x] stable and/or uncomplicated characteristics   [] evolving clinical presentation with changing characteristics  [] unstable and unpredictable characteristics;   [x] Clinical decision making of [x] low, [] moderate, [] high complexity using standardized patient assessment instrument and/or measurable assessment of functional outcome. [x] EVAL (LOW) 63115 (typically 15 minutes face-to-face)  [] EVAL (MOD) 40338 (typically 30 minutes face-to-face)  [] EVAL (HIGH) 91124 (typically 45 minutes face-to-face)  [] RE-EVAL     PLAN:   Frequency/Duration:  2 days per week for 5 Weeks:  Interventions:  [x]  Therapeutic exercise including: strength training, ROM, for Lower extremity and core   [x]  NMR activation and proprioception for LE, Glutes and Core   [x]  Manual therapy as indicated for LE, Hip and spine to include: Dry Needling/IASTM, STM, PROM, Gr I-IV mobilizations, manipulation. [x] Modalities as needed that may include: thermal agents, E-stim, Biofeedback, US, iontophoresis as indicated  [x] Patient education on joint protection, postural re-education, activity modification, progression of HEP.     HEP instruction: Written HEP instructions provided and reviewed     Discussion today included: What gym equipment to use and to be conscious of form including not letting knees bow or hyperextend. Suggested adding hip abduction machine to routine and continuing as long as not painful. Also discussed that PT will not solve patient's issue but can help improve flexibility, strength/stability. 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 Independent in HEP and progression per patient tolerance, in order to prevent re-injury. [] Progressing: [] Met: [] Not Met: [] Adjusted  2. Patient will have a decrease in pain to facilitate improvement in movement, function, and ADLs as indicated by Functional Deficits. [] Progressing: [] Met: [] Not Met: [] Adjusted    Long Term Goals: To be achieved in: 5 weeks  1. FOTO score of at least 71 to assist with reaching prior level of function. [] Progressing: [] Met: [] Not Met: [] Adjusted  2. Patient will demonstrate increased L quad flexibility to 118 degrees or greater to reduce strain on tissues and lower pain levels for descending stairs. [] Progressing: [] Met: [] Not Met: [] Adjusted  3. Patient will demonstrate an increase in hip abd and ext strength to at least 4+/5 for improved tolerance of prolonged walking. [] Progressing: [] Met: [] Not Met: [] Adjusted  4. Patient will return to functional activities including tolerating ambulation for at least 20 minutes without difficulty to progress towards PLOF. [] Progressing: [] Met: [] Not Met: [] Adjusted  5. Patient to report a decrease in pain and stiffness by 50% when first waking up in the morning and transitioning out of bed.      [] Progressing: [] Met: [] Not Met: [] Adjusted     Electronically signed by:  Case Patino, PT DPT

## 2022-04-22 NOTE — FLOWSHEET NOTE
[address] [facility]  Phone: [phone]   Fax: [phone]    Physical Therapy Daily Treatment Note  Date:  2022    Patient Name:  Klaudia Lowery    :  1955  MRN: [id number]  Medical/Treatment Diagnosis Information:  Diagnosis: M17.12 - Patellofemoral arthritis of left knee  Treatment Diagnosis: L knee pain, hip weakness, decreased quad flexibility, impaired gait  Insurance/Certification information:  PT Insurance Information: MEDICARE   Physician Information:    Melanie Garces MD  Plan of care signed (Y/N): []  Yes [x]  No     Date of Patient follow up with Physician:      Progress Report: []  Yes  [x]  No     Date Range for reporting period:  Beginnin22  Ending:     Progress report due (10 Rx/or 30 days whichever is less): visit #10 or      Recertification due (POC duration/ or 90 days whichever is less): visit #10 or 22     Visit # Insurance Allowable Auth required? Date Range   1/10 Medical necessity []  Yes  [x]  No n/a     Latex Allergy:  [x]NO      []YES  Preferred Language for Healthcare:   [x]English       []other:    Functional Scale:           Date assessed:  FOTO physical FS primary measure score = 63; risk adjusted = 61  22    Pain level:  3/10     SUBJECTIVE:  See eval    OBJECTIVE: See eval      RESTRICTIONS/PRECAUTIONS: None     Exercises/Interventions:     Therapeutic Exercises (66612) Resistance / level Sets/sec Reps Notes   Nustep       IB       HSS       Prone quad stretch                                           Therapeutic Activities (21415)                                          Neuromuscular Re-ed (62280)       Fwd step down       Lat heel dips                                    Manual Intervention (82995)                                                     Modalities:     Pt. Education:  : patient educated on diagnosis, prognosis and expectations for rehab.  Discussion today included: What gym equipment to use and to be conscious of form including not letting knees bow or hyperextend. Suggested adding hip abduction machine to routine and continuing as long as not painful. Also discussed that PT will not solve patient's issue but can help improve flexibility, strength/stability.   -all patient questions were answered    Home Exercise Program:      Therapeutic Exercise and NMR EXR  [x] (66556) Provided verbal/tactile cueing for activities related to strengthening, flexibility, endurance, ROM for improvements in  [x] LE / Lumbar: LE, proximal hip, and core control with self care, mobility, lifting, ambulation. [] UE / Cervical: cervical, postural, scapular, scapulothoracic and UE control with self care, reaching, carrying, lifting, house/yardwork, driving, computer work.  [] (95011) Provided verbal/tactile cueing for activities related to improving balance, coordination, kinesthetic sense, posture, motor skill, proprioception to assist with   [] LE / lumbar: LE, proximal hip, and core control in self care, mobility, lifting, ambulation and eccentric single leg control. [] UE / cervical: cervical, scapular, scapulothoracic and UE control with self care, reaching, carrying, lifting, house/yardwork, driving, computer work.   [] (64480) Therapist is in constant attendance of 2 or more patients providing skilled therapy interventions, but not providing any significant amount of measurable one-on-one time to either patient, for improvements in  [] LE / lumbar: LE, proximal hip, and core control in self care, mobility, lifting, ambulation and eccentric single leg control. [] UE / cervical: cervical, scapular, scapulothoracic and UE control with self care, reaching, carrying, lifting, house/yardwork, driving, computer work.      NMR and Therapeutic Activities:    [] (71884 or 03917) Provided verbal/tactile cueing for activities related to improving balance, coordination, kinesthetic sense, posture, motor skill, proprioception and motor activation to allow for proper function of   [] LE: / Lumbar core, proximal hip and LE with self care and ADLs  [] UE / Cervical: cervical, postural, scapular, scapulothoracic and UE control with self care, carrying, lifting, driving, computer work.   [] (34855) Gait Re-education- Provided training and instruction to the patient for proper LE, core and proximal hip recruitment and positioning and eccentric body weight control with ambulation re-education including up and down stairs     Home Management Training / Self Care:  [] (97711) Provided self-care/home management training related to activities of daily living and compensatory training, and/or use of adaptive equipment for improvement with: ADLs and compensatory training, meal preparation, safety procedures and instruction in use of adaptive equipment, including bathing, grooming, dressing, personal hygiene, basic household cleaning and chores.      Home Exercise Program:    [x] (37911) Reviewed/Progressed HEP activities related to strengthening, flexibility, endurance, ROM of   [] LE / Lumbar: core, proximal hip and LE for functional self-care, mobility, lifting and ambulation/stair navigation   [] UE / Cervical: cervical, postural, scapular, scapulothoracic and UE control with self care, reaching, carrying, lifting, house/yardwork, driving, computer work  [] (73059)Reviewed/Progressed HEP activities related to improving balance, coordination, kinesthetic sense, posture, motor skill, proprioception of   [] LE: core, proximal hip and LE for self care, mobility, lifting, and ambulation/stair navigation    [] UE / Cervical: cervical, postural,  scapular, scapulothoracic and UE control with self care, reaching, carrying, lifting, house/yardwork, driving, computer work    Manual Treatments:  PROM / STM / Oscillations-Mobs:  G-I, II, III, IV (PA's, Inf., Post.)  [] (40333) Provided manual therapy to mobilize LE, proximal hip and/or LS spine soft tissue/joints for the purpose of modulating pain, promoting relaxation,  increasing ROM, reducing/eliminating soft tissue swelling/inflammation/restriction, improving soft tissue extensibility and allowing for proper ROM for normal function with   [] LE / lumbar: self care, mobility, lifting and ambulation. [] UE / Cervical: self care, reaching, carrying, lifting, house/yardwork, driving, computer work. Modalities:  [] (93873) Vasopneumatic compression: Utilized vasopneumatic compression to decrease edema / swelling for the purpose of improving mobility and quad tone / recruitment which will allow for increased overall function including but not limited to self-care, transfers, ambulation, and ascending / descending stairs. 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 Progressing: []? Met: []? Not Met: []? Adjusted     Therapist goals for Patient:   Short Term Goals: To be achieved in: 2 weeks  1. Independent in HEP and progression per patient tolerance, in order to prevent re-injury. []? Progressing: []? Met: []? Not Met: []? Adjusted  2. Patient will have a decrease in pain to facilitate improvement in movement, function, and ADLs as indicated by Functional Deficits. []? Progressing: []? Met: []?  Not Met: []? Adjusted     Long Term Goals: To be achieved in: 5 weeks  1. FOTO score of at least 71 to assist with reaching prior level of function. []? Progressing: []? Met: []? Not Met: []? Adjusted  2. Patient will demonstrate increased L quad flexibility to 118 degrees or greater to reduce strain on tissues and lower pain levels for descending stairs. []? Progressing: []? Met: []? Not Met: []? Adjusted  3. Patient will demonstrate an increase in hip abd and ext strength to at least 4+/5 for improved tolerance of prolonged walking. []? Progressing: []? Met: []? Not Met: []? Adjusted  4. Patient will return to functional activities including tolerating ambulation for at least 20 minutes without difficulty to progress towards PLOF. []? Progressing: []? Met: []? Not Met: []? Adjusted  5. Patient to report a decrease in pain and stiffness by 50% when first waking up in the morning and transitioning out of bed.     []? Progressing: []? Met: []? Not Met: []? Adjusted      Overall Progression Towards Functional goals/ Treatment Progress Update:  [] Patient is progressing as expected towards functional goals listed. [] Progression is slowed due to complexities/Impairments listed. [] Progression has been slowed due to co-morbidities.   [x] Plan just implemented, too soon to assess goals progression <30days   [] Goals require adjustment due to lack of progress  [] Patient is not progressing as expected and requires additional follow up with physician  [] Other    Persisting Functional Limitations/Impairments:  []Sleeping []Sitting               []Standing []Transfers        [x]Walking []Kneeling               []Stairs []Squatting / bending   []ADLs []Reaching  []Lifting  []Housework  []Driving [x]Job related tasks  []Sports/Recreation []Other:        ASSESSMENT:  See eval  Treatment/Activity Tolerance:  [x] Patient able to complete tx [] Patient limited by fatigue  [] Patient limited by pain  [] Patient limited by other medical complications  [] Other:     Prognosis: [] Good [x] Fair  [] Poor    Patient Requires Follow-up: [x] Yes  [] No    Plan for next treatment session:Stretching, quad strengthening, hip strengthening    PLAN: See eval. PT 2x / week for 4-5 weeks. [] Continue per plan of care [] Alter current plan (see comments)  [x] Plan of care initiated [] Hold pending MD visit [] Discharge    Electronically signed by: Nathan Bauman, PT  DPT    Note: If patient does not return for scheduled/ recommended follow up visits, this note will serve as a discharge from care along with most recent update on progress.

## 2022-04-28 ENCOUNTER — HOSPITAL ENCOUNTER (OUTPATIENT)
Dept: PHYSICAL THERAPY | Age: 67
Setting detail: THERAPIES SERIES
Discharge: HOME | End: 2022-04-28
Payer: MEDICARE

## 2022-04-28 PROCEDURE — 97110 THERAPEUTIC EXERCISES: CPT

## 2022-04-28 PROCEDURE — 97112 NEUROMUSCULAR REEDUCATION: CPT

## 2022-04-28 NOTE — FLOWSHEET NOTE
[address] [facility]  Phone: [phone]   Fax: [phone]    Physical Therapy Daily Treatment Note  Date:  2022    Patient Name:  Jordon Henning    :  1955  MRN: [id number]  Medical/Treatment Diagnosis Information:  Diagnosis: M17.12 - Patellofemoral arthritis of left knee  Treatment Diagnosis: L knee pain, hip weakness, decreased quad flexibility, impaired gait  Insurance/Certification information:  PT Insurance Information: MEDICARE   Physician Information:    Jessica Holman MD  Plan of care signed (Y/N): []  Yes [x]  No     Date of Patient follow up with Physician:      Progress Report: []  Yes  [x]  No     Date Range for reporting period:  Beginnin22  Ending:     Progress report due (10 Rx/or 30 days whichever is less): visit #10 or 28     Recertification due (POC duration/ or 90 days whichever is less): visit #10 or 22     Visit # Insurance Allowable Auth required? Date Range   2/10 Medical necessity []  Yes  [x]  No n/a     Latex Allergy:  [x]NO      []YES  Preferred Language for Healthcare:   [x]English       []other:    Functional Scale:           Date assessed:  TO physical FS primary measure score = 63; risk adjusted = 61  22    Pain level:  3/10     SUBJECTIVE: Pt reports no change in status following eval. Came in wearing knee brace, says he wears it for stability. Still waiting to hear about his new knee brace. Pt added in abduction machine at the gym and has been enjoying it.     OBJECTIVE:       RESTRICTIONS/PRECAUTIONS: None     Exercises/Interventions:     Therapeutic Exercises (37347) Resistance / level Sets/sec Reps Notes   Nustep 6 5'     IB  2x30\"      HR  2 10    HSS  30\" B  R done in seated to avoid L knee buckling   Prone quad stretch   2x30\" L     LAQ in seated   12 B : Pt says he uses weight at the gym, can add weight at next visit   Seated hip ER, IR Lime TB  10 B    Hip abd sliders 10 B    Hip ext sliders   10 B    Hip abd SLR   10 B Stance leg on 2\" step           HEP next visit                     Therapeutic Activities (32329)                                          Neuromuscular Re-ed (20857)       Fwd step down   12 B    Lat step downs   12 B    Side steps Ballet barre  3 laps                         Manual Intervention (61783)                                                     Modalities:     Pt. Education:  4/22: patient educated on diagnosis, prognosis and expectations for rehab. Discussion today included: What gym equipment to use and to be conscious of form including not letting knees bow or hyperextend. Suggested adding hip abduction machine to routine and continuing as long as not painful. Also discussed that PT will not solve patient's issue but can help improve flexibility, strength/stability.   -all patient questions were answered  4/28: patient educated on adding eccentric components to HS curls and knee extension machines. Home Exercise Program:      Therapeutic Exercise and NMR EXR  [x] (73704) Provided verbal/tactile cueing for activities related to strengthening, flexibility, endurance, ROM for improvements in  [x] LE / Lumbar: LE, proximal hip, and core control with self care, mobility, lifting, ambulation. [] UE / Cervical: cervical, postural, scapular, scapulothoracic and UE control with self care, reaching, carrying, lifting, house/yardwork, driving, computer work.  [] (47455) Provided verbal/tactile cueing for activities related to improving balance, coordination, kinesthetic sense, posture, motor skill, proprioception to assist with   [] LE / lumbar: LE, proximal hip, and core control in self care, mobility, lifting, ambulation and eccentric single leg control.    [] UE / cervical: cervical, scapular, scapulothoracic and UE control with self care, reaching, carrying, lifting, house/yardwork, driving, computer work.   [] (41745) Therapist is in constant attendance of 2 or more patients providing skilled therapy interventions, but not providing any significant amount of measurable one-on-one time to either patient, for improvements in  [] LE / lumbar: LE, proximal hip, and core control in self care, mobility, lifting, ambulation and eccentric single leg control. [] UE / cervical: cervical, scapular, scapulothoracic and UE control with self care, reaching, carrying, lifting, house/yardwork, driving, computer work. NMR and Therapeutic Activities:    [x] (80862 or 00881) Provided verbal/tactile cueing for activities related to improving balance, coordination, kinesthetic sense, posture, motor skill, proprioception and motor activation to allow for proper function of   [x] LE: / Lumbar core, proximal hip and LE with self care and ADLs  [] UE / Cervical: cervical, postural, scapular, scapulothoracic and UE control with self care, carrying, lifting, driving, computer work.   [] (48935) Gait Re-education- Provided training and instruction to the patient for proper LE, core and proximal hip recruitment and positioning and eccentric body weight control with ambulation re-education including up and down stairs     Home Management Training / Self Care:  [] (73542) Provided self-care/home management training related to activities of daily living and compensatory training, and/or use of adaptive equipment for improvement with: ADLs and compensatory training, meal preparation, safety procedures and instruction in use of adaptive equipment, including bathing, grooming, dressing, personal hygiene, basic household cleaning and chores.      Home Exercise Program:    [] (92851) Reviewed/Progressed HEP activities related to strengthening, flexibility, endurance, ROM of   [] LE / Lumbar: core, proximal hip and LE for functional self-care, mobility, lifting and ambulation/stair navigation   [] UE / Cervical: cervical, postural, scapular, scapulothoracic and UE control with self care, reaching, carrying, lifting, house/yardwork, driving, computer work  [] (30980)Reviewed/Progressed HEP activities related to improving balance, coordination, kinesthetic sense, posture, motor skill, proprioception of   [] LE: core, proximal hip and LE for self care, mobility, lifting, and ambulation/stair navigation    [] UE / Cervical: cervical, postural,  scapular, scapulothoracic and UE control with self care, reaching, carrying, lifting, house/yardwork, driving, computer work    Manual Treatments:  PROM / STM / Oscillations-Mobs:  G-I, II, III, IV (PA's, Inf., Post.)  [] (97046) Provided manual therapy to mobilize LE, proximal hip and/or LS spine soft tissue/joints for the purpose of modulating pain, promoting relaxation,  increasing ROM, reducing/eliminating soft tissue swelling/inflammation/restriction, improving soft tissue extensibility and allowing for proper ROM for normal function with   [] LE / lumbar: self care, mobility, lifting and ambulation. [] UE / Cervical: self care, reaching, carrying, lifting, house/yardwork, driving, computer work. Modalities:  [] (49468) Vasopneumatic compression: Utilized vasopneumatic compression to decrease edema / swelling for the purpose of improving mobility and quad tone / recruitment which will allow for increased overall function including but not limited to self-care, transfers, ambulation, and ascending / descending stairs.      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 Progressing: []? Met: []? Not Met: []? Adjusted     Therapist goals for Patient:   Short Term Goals: To be achieved in: 2 weeks  1. Independent in HEP and progression per patient tolerance, in order to prevent re-injury. []? Progressing: []? Met: []? Not Met: []? Adjusted  2. Patient will have a decrease in pain to facilitate improvement in movement, function, and ADLs as indicated by Functional Deficits. []? Progressing: []? Met: []? Not Met: []? Adjusted     Long Term Goals: To be achieved in: 5 weeks  1. FOTO score of at least 71 to assist with reaching prior level of function. []? Progressing: []? Met: []? Not Met: []? Adjusted  2. Patient will demonstrate increased L quad flexibility to 118 degrees or greater to reduce strain on tissues and lower pain levels for descending stairs. []? Progressing: []? Met: []? Not Met: []? Adjusted  3. Patient will demonstrate an increase in hip abd and ext strength to at least 4+/5 for improved tolerance of prolonged walking. []? Progressing: []? Met: []? Not Met: []? Adjusted  4. Patient will return to functional activities including tolerating ambulation for at least 20 minutes without difficulty to progress towards PLOF. []? Progressing: []? Met: []? Not Met: []? Adjusted  5. Patient to report a decrease in pain and stiffness by 50% when first waking up in the morning and transitioning out of bed.     []? Progressing: []? Met: []? Not Met: []? Adjusted      Overall Progression Towards Functional goals/ Treatment Progress Update:  [] Patient is progressing as expected towards functional goals listed. [] Progression is slowed due to complexities/Impairments listed. [] Progression has been slowed due to co-morbidities.   [x] Plan just implemented, too soon to

## 2022-04-29 NOTE — TELEPHONE ENCOUNTER
General Question     Subject: PT ASKING ABOUT THE STATUS OF THE BRACE. IF MEDICARE HAS APPROVED IT AS OF YET?   Patient and /or Facility RequestIsela Jhonjak  Contact Number: [phone]

## 2022-04-30 ENCOUNTER — HOSPITAL ENCOUNTER (OUTPATIENT)
Dept: PHYSICAL THERAPY | Age: 67
Setting detail: THERAPIES SERIES
Discharge: HOME | End: 2022-04-30
Payer: MEDICARE

## 2022-04-30 PROCEDURE — 97112 NEUROMUSCULAR REEDUCATION: CPT

## 2022-04-30 PROCEDURE — 97110 THERAPEUTIC EXERCISES: CPT
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
## 2022-05-03 ENCOUNTER — HOSPITAL ENCOUNTER (OUTPATIENT)
Dept: PHYSICAL THERAPY | Age: 67
Setting detail: THERAPIES SERIES
Discharge: HOME | End: 2022-05-03
Payer: MEDICARE

## 2022-05-03 PROCEDURE — 97110 THERAPEUTIC EXERCISES: CPT
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
## 2022-05-04 ENCOUNTER — TELEPHONE (OUTPATIENT)
Dept: ORTHOPEDIC SURGERY | Age: 67
End: 2022-05-04

## 2022-05-04 NOTE — TELEPHONE ENCOUNTER
General Question     Subject: PATIENT IS CALLING REGARDING THE STATUS OF A BRACE FOR LEFT KNEE. PLEASE ADVISE.     Patient:  Jose King  Contact Number: [phone]

## 2022-05-06 ENCOUNTER — HOSPITAL ENCOUNTER (OUTPATIENT)
Dept: PHYSICAL THERAPY | Age: 67
Setting detail: THERAPIES SERIES
Discharge: HOME | End: 2022-05-06
Payer: MEDICARE

## 2022-05-06 PROCEDURE — 97110 THERAPEUTIC EXERCISES: CPT

## 2022-05-06 PROCEDURE — 97530 THERAPEUTIC ACTIVITIES: CPT

## 2022-05-06 NOTE — FLOWSHEET NOTE
[address] [facility]  Phone: [phone]   Fax: [phone]    Physical Therapy Daily Treatment Note  Date:  2022    Patient Name:  Gloria Perez    :  1955  MRN: [id number]  Medical/Treatment Diagnosis Information:  Diagnosis: M17.12 - Patellofemoral arthritis of left knee  Treatment Diagnosis: L knee pain, hip weakness, decreased quad flexibility, impaired gait  Insurance/Certification information:  PT Insurance Information: MEDICARE   Physician Information:    Charo Rowland MD  Plan of care signed (Y/N): []  Yes [x]  No     Date of Patient follow up with Physician:      Progress Report: []  Yes  [x]  No     Date Range for reporting period:  Beginnin22  Ending:     Progress report due (10 Rx/or 30 days whichever is less): visit #10 or 03     Recertification due (POC duration/ or 90 days whichever is less): visit #10 or 22     Visit # Insurance Allowable Auth required? Date Range   5/10 Medical necessity []  Yes  [x]  No n/a     Latex Allergy:  [x]NO      []YES  Preferred Language for Healthcare:   [x]English       []other:    Functional Scale:           Date assessed:  [facility] physical FS primary measure score = 63; risk adjusted = 61  22    Pain level:  4/10     SUBJECTIVE: Pt reports L knee is sore today. He felt fine after his last session, and felt fine after his workout at [employer] on Wednesday. He was on his feet a lot yesterday and thinks that may have contributed.      OBJECTIVE:   : Pt 12 minutes late this date due to traffic    RESTRICTIONS/PRECAUTIONS: None     Exercises/Interventions:     Therapeutic Exercises (16238) Resistance / level Sets/sec Reps Notes   Nustep 1 5'     IB     HR     HSS  30\" B  seated   Prone quad stretch       : HEP   Supine ITB and glute stretch with strap  60\" L    LAQ in seated 2#  Cues for slow speed   Seated hip ER, IR Lime TB     Hip abd sliders      Hip ext sliders Mini squats   [place] for balance   Hip abd SLR 2# ankle weight   Stance leg on 2\" step   4/30: HEP   Supine clamshells Blue TB  HEP   Bridges   HEP          Leg extension - double legs 30#    Leg extension - eccentric, up 2, down 1 30#    HS curls 50#    Leg press - double 110#    Leg press - single leg 55#    Sidelying Clamshells Lime TB  20 B                                Therapeutic Activities (97069)       STS with KB 10# 2 10    [place] carry 20# KB 1 lap around gym                          Neuromuscular Re-ed (54989)       Fwd step down 6\" 2 10 BB UE use   Lateral step ups 6\"  B UE use   Side steps 2# ankle weight  Done at [employer]   Tiltboard taps - A/P, M/L     Tiltboard balance - A/P, M/L     Tandem stance Airex                         Manual Intervention (62857)                                                     Modalities:     Pt. Education:  4/22: patient educated on diagnosis, prognosis and expectations for rehab. Discussion today included: What gym equipment to use and to be conscious of form including not letting knees bow or hyperextend. Suggested adding hip abduction machine to routine and continuing as long as not painful. Also discussed that PT will not solve patient's issue but can help improve flexibility, strength/stability.   -all patient questions were answered  4/28: patient educated on adding eccentric components to HS curls and knee extension machines. Home Exercise Program:    Access Code: [email or web address]  URL: [email or web address]  Date: 04/30/2022  Prepared by: Brigido 42 with Resistance - 1 x daily - 7 x weekly - 2 sets - 10 reps  Supine Bridge - 1 x daily - 7 x weekly - 2 sets - 10 reps  Standing Hip Abduction with Counter Support - 1 x daily - 7 x weekly - 2 sets - 10 reps  Prone Quadriceps Stretch with Strap - 1 x daily - 7 x weekly - 1 sets - 3 reps - 30 hold    5/6: Pt educated on shifting from [address] to sidelying clamshells, no HO provided    Therapeutic Exercise and NMR EXR  [x] (59533) Provided verbal/tactile cueing for activities related to strengthening, flexibility, endurance, ROM for improvements in  [x] LE / Lumbar: LE, proximal hip, and core control with self care, mobility, lifting, ambulation. [] UE / Cervical: cervical, postural, scapular, scapulothoracic and UE control with self care, reaching, carrying, lifting, house/yardwork, driving, computer work.  [] (17063) Provided verbal/tactile cueing for activities related to improving balance, coordination, kinesthetic sense, posture, motor skill, proprioception to assist with   [] LE / lumbar: LE, proximal hip, and core control in self care, mobility, lifting, ambulation and eccentric single leg control. [] UE / cervical: cervical, scapular, scapulothoracic and UE control with self care, reaching, carrying, lifting, house/yardwork, driving, computer work.   [] (79288) Therapist is in constant attendance of 2 or more patients providing skilled therapy interventions, but not providing any significant amount of measurable one-on-one time to either patient, for improvements in  [] LE / lumbar: LE, proximal hip, and core control in self care, mobility, lifting, ambulation and eccentric single leg control. [] UE / cervical: cervical, scapular, scapulothoracic and UE control with self care, reaching, carrying, lifting, house/yardwork, driving, computer work.      NMR and Therapeutic Activities:    [x] (19655 or 72565) Provided verbal/tactile cueing for activities related to improving balance, coordination, kinesthetic sense, posture, motor skill, proprioception and motor activation to allow for proper function of   [x] LE: / Lumbar core, proximal hip and LE with self care and ADLs  [] UE / Cervical: cervical, postural, scapular, scapulothoracic and UE control with self care, carrying, lifting, driving, computer work.   [] (93804) Gait Re-education- Provided training and instruction to the patient for proper LE, core and proximal hip recruitment and positioning and eccentric body weight control with ambulation re-education including up and down stairs     Home Management Training / Self Care:  [] (51984) Provided self-care/home management training related to activities of daily living and compensatory training, and/or use of adaptive equipment for improvement with: ADLs and compensatory training, meal preparation, safety procedures and instruction in use of adaptive equipment, including bathing, grooming, dressing, personal hygiene, basic household cleaning and chores. Home Exercise Program:    [] (87595) Reviewed/Progressed HEP activities related to strengthening, flexibility, endurance, ROM of   [] LE / Lumbar: core, proximal hip and LE for functional self-care, mobility, lifting and ambulation/stair navigation   [] UE / Cervical: cervical, postural, scapular, scapulothoracic and UE control with self care, reaching, carrying, lifting, house/yardwork, driving, computer work  [] (02768)Reviewed/Progressed HEP activities related to improving balance, coordination, kinesthetic sense, posture, motor skill, proprioception of   [] LE: core, proximal hip and LE for self care, mobility, lifting, and ambulation/stair navigation    [] UE / Cervical: cervical, postural,  scapular, scapulothoracic and UE control with self care, reaching, carrying, lifting, house/yardwork, driving, computer work    Manual Treatments:  PROM / STM / Oscillations-Mobs:  G-I, II, III, IV (PA's, Inf., Post.)  [] (55315) Provided manual therapy to mobilize LE, proximal hip and/or LS spine soft tissue/joints for the purpose of modulating pain, promoting relaxation,  increasing ROM, reducing/eliminating soft tissue swelling/inflammation/restriction, improving soft tissue extensibility and allowing for proper ROM for normal function with   [] LE / lumbar: self care, mobility, lifting and ambulation. [] UE / Cervical: self care, reaching, carrying, lifting, house/yardwork, driving, computer work. Modalities:  [] (46492) Vasopneumatic compression: Utilized vasopneumatic compression to decrease edema / swelling for the purpose of improving mobility and quad tone / recruitment which will allow for increased overall function including but not limited to self-care, transfers, ambulation, and ascending / descending stairs. 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 Progressing: []? Met: []? Not Met: []? Adjusted     Therapist goals for Patient:   Short Term Goals: To be achieved in: 2 weeks  1. Independent in HEP and progression per patient tolerance, in order to prevent re-injury. []? Progressing: []? Met: []? Not Met: []? Adjusted  2. Patient will have a decrease in pain to facilitate improvement in movement, function, and ADLs as indicated by Functional Deficits. []? Progressing: []? Met: []? Not Met: []? Adjusted     Long Term Goals: To be achieved in: 5 weeks  1. FOTO score of at least 71 to assist with reaching prior level of function. []? Progressing: []? Met: []? Not Met: []? Adjusted  2.  Patient will demonstrate increased L quad flexibility to 118 degrees or greater to reduce strain on tissues and lower pain levels for descending stairs. []? Progressing: []? Met: []? Not Met: []? Adjusted  3. Patient will demonstrate an increase in hip abd and ext strength to at least 4+/5 for improved tolerance of prolonged walking. []? Progressing: []? Met: []? Not Met: []? Adjusted  4. Patient will return to functional activities including tolerating ambulation for at least 20 minutes without difficulty to progress towards PLOF. []? Progressing: []? Met: []? Not Met: []? Adjusted  5. Patient to report a decrease in pain and stiffness by 50% when first waking up in the morning and transitioning out of bed.     []? Progressing: []? Met: []? Not Met: []? Adjusted      Overall Progression Towards Functional goals/ Treatment Progress Update:  [] Patient is progressing as expected towards functional goals listed. [] Progression is slowed due to complexities/Impairments listed. [] Progression has been slowed due to co-morbidities. [x] Plan just implemented, too soon to assess goals progression <30days   [] Goals require adjustment due to lack of progress  [] Patient is not progressing as expected and requires additional follow up with physician  [] Other    Persisting Functional Limitations/Impairments:  []Sleeping []Sitting               []Standing []Transfers        [x]Walking []Kneeling               []Stairs []Squatting / bending   []ADLs []Reaching  []Lifting  []Housework  []Driving [x]Job related tasks  []Sports/Recreation []Other:        ASSESSMENT: Pt did well this date with progressive functional LE strengthening. He required cues to avoid letting his hips fall back on the sidelying clamshells. He demonstrated good form on the weighted STS, and a higher weight could be used next time, as weight was limited due to equipment availability this date. Pt would benefit from continued LE strengthening so he can return modified work duties as a .      Treatment/Activity Tolerance:  [x] Patient able to complete tx  [] Patient limited by fatigue  [] Patient limited by pain  [] Patient limited by other medical complications  [] Other:     Prognosis: [] Good [x] Fair  [] Poor    Patient Requires Follow-up: [x] Yes  [] No    Plan for next treatment session:Stretching, quad strengthening, hip strengthening    PLAN: See eval. PT 2x / week for 4-5 weeks. [] Continue per plan of care [] Alter current plan (see comments)  [x] Plan of care initiated [] Hold pending MD visit [] Discharge    Electronically signed by: Amanda Elias PT  DPT  Therapist was present, directed the patient's care, made skilled judgement, and was responsible for assessment and treatment of the patient. -January Valenzuela, SPT        Note: If patient does not return for scheduled/ recommended follow up visits, this note will serve as a discharge from care along with most recent update on progress.

## 2022-05-09 ENCOUNTER — HOSPITAL ENCOUNTER (OUTPATIENT)
Dept: PHYSICAL THERAPY | Age: 67
Setting detail: THERAPIES SERIES
Discharge: HOME | End: 2022-05-09
Payer: MEDICARE

## 2022-05-09 PROCEDURE — 97112 NEUROMUSCULAR REEDUCATION: CPT

## 2022-05-09 PROCEDURE — 97110 THERAPEUTIC EXERCISES: CPT

## 2022-05-09 PROCEDURE — 97140 MANUAL THERAPY 1/> REGIONS: CPT
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
## 2022-05-12 ENCOUNTER — HOSPITAL ENCOUNTER (OUTPATIENT)
Dept: PHYSICAL THERAPY | Age: 67
Setting detail: THERAPIES SERIES
Discharge: HOME | End: 2022-05-12
Payer: MEDICARE

## 2022-05-12 PROCEDURE — 97016 VASOPNEUMATIC DEVICE THERAPY: CPT

## 2022-05-12 PROCEDURE — 97110 THERAPEUTIC EXERCISES: CPT

## 2022-05-12 PROCEDURE — 97140 MANUAL THERAPY 1/> REGIONS: CPT

## 2022-05-12 PROCEDURE — 97112 NEUROMUSCULAR REEDUCATION: CPT
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
## 2022-05-13 DIAGNOSIS — M17.12 PRIMARY OSTEOARTHRITIS OF LEFT KNEE: ICD-10-CM

## 2022-05-13 DIAGNOSIS — M17.12 PATELLOFEMORAL ARTHRITIS OF LEFT KNEE: ICD-10-CM

## 2022-05-13 PROCEDURE — L1845 KO DOUBLE UPRIGHT PRE CST: HCPCS | Performed by: ORTHOPAEDIC SURGERY

## 2022-05-16 ENCOUNTER — HOSPITAL ENCOUNTER (OUTPATIENT)
Dept: PHYSICAL THERAPY | Age: 67
Setting detail: THERAPIES SERIES
Discharge: HOME | End: 2022-05-16
Payer: MEDICARE

## 2022-05-16 PROCEDURE — 97110 THERAPEUTIC EXERCISES: CPT

## 2022-05-16 PROCEDURE — 97112 NEUROMUSCULAR REEDUCATION: CPT

## 2022-05-16 PROCEDURE — 97140 MANUAL THERAPY 1/> REGIONS: CPT
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
## 2022-05-20 ENCOUNTER — HOSPITAL ENCOUNTER (OUTPATIENT)
Dept: PHYSICAL THERAPY | Age: 67
Setting detail: THERAPIES SERIES
Discharge: HOME | End: 2022-05-20
Payer: MEDICARE

## 2022-05-20 PROCEDURE — 97016 VASOPNEUMATIC DEVICE THERAPY: CPT

## 2022-05-20 PROCEDURE — 97110 THERAPEUTIC EXERCISES: CPT

## 2022-05-20 NOTE — PLAN OF CARE
[address] [facility]  Phone: [phone]   Fax: [phone]   Physical Therapy Re-Certification Plan of Care    Dear Tuyet Real MD  ,    We had the pleasure of treating the following patient for physical therapy services at [facility]. A summary of our findings can be found in the updated assessment below. This includes our plan of care. If you have any questions or concerns regarding these findings, please do not hesitate to contact me at the office phone number checked above.   Thank you for the referral.     Physician Signature:________________________________Date:__________________  By signing above (or electronic signature), therapist's plan is approved by physician      Functional Outcome:                    FOTO physical FS primary measure score = 63 (No change from eval)                                     5/20:   *Done w/o knee brace    AROM     L R   Hip Flexion       Hip Abduction       Hip ER       Hip IR       Knee Flexion 122  135    Knee Extension 0 deg  +7 hyperextend    Dorsiflexion  9 deg  8 deg    Plantarflexion        Inversion        Eversion        *L ankle resting position is 10 deg inversion     Strength (0-5) / Myotomes Left Right   Hip Flexion - supine 4+ 4   Hip Flexion - seated (L1-2)       Hip Abduction 4 4+   Hip Extension 4- 4-   Hip ER       Hip IR       Quads (L2-4) 4+ 5           Flexibility       Hamstrings (90/90) Lack 12  Lack <10   ITB (Shan)       Quads (Ely's) 118 129   Hip Flexor [place] Scrape)       Piriformis  Less than mild limitation Mod limitation     Balance: unable to complete heel or toe walking without compensation     Stairs: able to climb 3 x 6 inch steps with 1 HR, reciprocal pattern but decreased eccentric quad control with descent as of 5/20 this is improving but pt still demonstrates decreased eccentric quad control     Overall Response to Treatment:   []Patient is responding well to treatment and improvement is noted with regards  to goals   []Patient should continue to improve in reasonable time if they continue HEP   []Patient has plateaued and is no longer responding to skilled PT intervention    []Patient is getting worse and would benefit from return to referring MD   []Patient unable to adhere to initial POC   [x]Other: Pt with improvements in gross hip strength, piriformis flexibility, hamstring flexibility, and quad flexibility. He has met 2/5 LTGs at this time. Still demonstrating deficits in hip extension, hip abduction, and quad flexibility. Pt having difficulty with functional movements including walking and fast movements such as pivot turns. Pt would benefit from continuing skilled PT to address remaining deficits to improve hip strength and balance so he can return to modified work duties. Date range of Visits: 22-22  Total Visits: 9    Recommendation:    [x]Continue PT 2x / wk for 3 weeks. []Hold PT, pending MD visit      Physical Therapy Daily Treatment Note  Date:  2022    Patient Name:  Sarah Watt    :  1955  MRN: [id number]  Medical/Treatment Diagnosis Information:  Diagnosis: M17.12 - Patellofemoral arthritis of left knee  Treatment Diagnosis: L knee pain, hip weakness, decreased quad flexibility, impaired gait  Insurance/Certification information:  PT Insurance Information: MEDICARE   Physician Information:    Tuyet Real MD  Plan of care signed (Y/N): [x]  Yes []  No     Date of Patient follow up with Physician:      Progress Report: [x]  Yes  []  No     Date Range for reporting period:  Beginnin22 - signed  POC: 22  Ending:     Progress report due (10 Rx/or 30 days whichever is less): visit #20 or      Recertification due (POC duration/ or 90 days whichever is less): visit #16 or 22     Visit # Insurance Allowable Auth required?  Date Range 9/10 Medical necessity []  Yes  [x]  No n/a     Latex Allergy:  [x]NO      []YES  Preferred Language for Healthcare:   [x]English       []other:    Functional Scale:           Date assessed:  FOTO physical FS primary measure score = 63; risk adjusted = 61  4/22/22  FOTO physical FS primary measure score = 63     5/20/22    Pain level:  3/10     SUBJECTIVE: Pt reports more hip tightness this date. He was sitting down more yesterday assembling something, and he feels like the hip tightened up after that.       OBJECTIVE:   5/6: Pt 12 minutes late this date due to traffic  5/9: Leg swelling, measured at patellar base: 39 cm R, 41 cm L  5/12: Leg swelling, measured at patellar base: 38.8 cm, 42.5 cm L  5/20: Leg swelling, measured at patellar base: 39 cm R, 41 cm L    5/20:   *Done w/o knee brace    AROM     L R   Hip Flexion       Hip Abduction       Hip ER       Hip IR       Knee Flexion 122  135    Knee Extension 0 deg  +7 hyperextend    Dorsiflexion  9 deg  8 deg    Plantarflexion        Inversion        Eversion        *L ankle resting position is 10 deg inversion     Strength (0-5) / Myotomes Left Right   Hip Flexion - supine 4+ 4   Hip Flexion - seated (L1-2)       Hip Abduction 4 4+   Hip Extension 4- 4-   Hip ER       Hip IR       Quads (L2-4) 4+ 5   Hamstrings       Ankle Dorsiflexion (L4-5)       Ankle Plantarflexion (S1-2)       Ankle Inversion       Ankle Eversion (S1-2)       Great Toe Extension (L5)               Flexibility       Hamstrings (90/90) Lack 12  Lack <10   ITB (Shan)       Quads (Ely's) 118 129   Hip Flexor  [place])       Piriformis  Less than mild limitation Mod limitation     Balance: unable to complete heel or toe walking without compensation     Stairs: able to climb 3 x 6 inch steps with 1 HR, reciprocal pattern but decreased eccentric quad control with descent as of 5/20 this is improving but pt still demonstrates decreased eccentric quad control     RESTRICTIONS/PRECAUTIONS: None Exercises/Interventions:     Therapeutic Exercises (69439) Resistance / level Sets/sec Reps Notes   Nustep 5 5'     IB  2x30\"    HR  2 10    HSS  30\" B  seated   Prone quad stretch       4/30: HEP   Supine ITB and glute stretch with strap      LAQ in seated 2#  Cues for slow speed   Seated hip ER, IR Lime TB     Hip abd sliders   5/16: added slight knee bend on R side   Hip ext sliders   5/16: added slight knee bend on R side   Mini squats   Ballet barre for balance   Hip abd SLR 2# ankle weight   Stance leg on 2\" step   4/30: HEP   Supine clamshells Blue TB  HEP   Bridges   HEP          Leg extension - double legs 30#    Leg extension - eccentric, up 2, down 1 30#    HS curls 50#    Leg press - double 110#    Leg press - single leg 55#    Sidelying Clamshells Lime TB   HEP   Sidelying hip abduction     POC completed this date, see above 5/20                    Therapeutic Activities (02275)       STS with KB 10#    [place] carry 20# KB    Ambulation                   Neuromuscular Re-ed (40922)       Fwd step down 6\" B UE use   Lateral step ups 6\" B UE use   Side steps Lime TB  Done at [place] barre   Tiltboard taps - A/P, M/L     Tiltboard balance - A/P, M/L     Balance  -tandem  -NBOS, twisting R/L with ball taps  -ball toss   Airex    2kg ball    Step ups onto airex       Fwd Step ups 6\"             Manual Intervention (32402)       STM with HawkGrips #8 to B HS tendon insertion       STM to L QL and glute med, ITB and glute med also done in sidelying                                       Modalities:   5/12: Gameready on mod setting to L knee in long sitting x 15 min at the end of the session  Pre-vaso: L suprapatellar: 42.5 cm  Post-vaso: L suprapatellar: 41 cm    5/20: Gameready on mod setting to L knee in long sitting x 15 min at the end of the session  Pre-vaso: L suprapatellar: 41 cm   Post-vaso: L suprapatellar: 42.5 cm    Pt.  Education:  4/22: patient educated on diagnosis, prognosis and expectations for rehab. Discussion today included: What gym equipment to use and to be conscious of form including not letting knees bow or hyperextend. Suggested adding hip abduction machine to routine and continuing as long as not painful. Also discussed that PT will not solve patient's issue but can help improve flexibility, strength/stability.   -all patient questions were answered  4/28: patient educated on adding eccentric components to HS curls and knee extension machines. Home Exercise Program:    Access Code: [email or web address]  URL: [email or web address]  Date: 04/30/2022  Prepared by: Brigido 42 with Resistance - 1 x daily - 7 x weekly - 2 sets - 10 reps  Supine Bridge - 1 x daily - 7 x weekly - 2 sets - 10 reps  Standing Hip Abduction with Counter Support - 1 x daily - 7 x weekly - 2 sets - 10 reps  Prone Quadriceps Stretch with Strap - 1 x daily - 7 x weekly - 1 sets - 3 reps - 30 hold    5/6: Pt educated on shifting from hooklying clamshells to sidelying clamshells, no HO provided    5/20: Added seated piriformis stretch, quadruped hip extensions, and fire hydrants, no HO provided    Therapeutic Exercise and NMR EXR  [x] (73629) Provided verbal/tactile cueing for activities related to strengthening, flexibility, endurance, ROM for improvements in  [x] LE / Lumbar: LE, proximal hip, and core control with self care, mobility, lifting, ambulation. [] UE / Cervical: cervical, postural, scapular, scapulothoracic and UE control with self care, reaching, carrying, lifting, house/yardwork, driving, computer work. [x] (18704) Provided verbal/tactile cueing for activities related to improving balance, coordination, kinesthetic sense, posture, motor skill, proprioception to assist with   [x] LE / lumbar: LE, proximal hip, and core control in self care, mobility, lifting, ambulation and eccentric single leg control.    [] UE / cervical: cervical, scapular, scapulothoracic and UE control with self care, reaching, carrying, lifting, house/yardwork, driving, computer work.   [] (30529) Therapist is in constant attendance of 2 or more patients providing skilled therapy interventions, but not providing any significant amount of measurable one-on-one time to either patient, for improvements in  [] LE / lumbar: LE, proximal hip, and core control in self care, mobility, lifting, ambulation and eccentric single leg control. [] UE / cervical: cervical, scapular, scapulothoracic and UE control with self care, reaching, carrying, lifting, house/yardwork, driving, computer work. NMR and Therapeutic Activities:    [] (24644 or 46154) Provided verbal/tactile cueing for activities related to improving balance, coordination, kinesthetic sense, posture, motor skill, proprioception and motor activation to allow for proper function of   [] LE: / Lumbar core, proximal hip and LE with self care and ADLs  [] UE / Cervical: cervical, postural, scapular, scapulothoracic and UE control with self care, carrying, lifting, driving, computer work.   [] (72682) Gait Re-education- Provided training and instruction to the patient for proper LE, core and proximal hip recruitment and positioning and eccentric body weight control with ambulation re-education including up and down stairs     Home Management Training / Self Care:  [] (89591) Provided self-care/home management training related to activities of daily living and compensatory training, and/or use of adaptive equipment for improvement with: ADLs and compensatory training, meal preparation, safety procedures and instruction in use of adaptive equipment, including bathing, grooming, dressing, personal hygiene, basic household cleaning and chores.      Home Exercise Program:    [] (34107) Reviewed/Progressed HEP activities related to strengthening, flexibility, endurance, ROM of   [] LE / Lumbar: core, proximal hip and LE for functional self-care, mobility, lifting and ambulation/stair navigation   [] UE / Cervical: cervical, postural, scapular, scapulothoracic and UE control with self care, reaching, carrying, lifting, house/yardwork, driving, computer work  [] (47178)Reviewed/Progressed HEP activities related to improving balance, coordination, kinesthetic sense, posture, motor skill, proprioception of   [] LE: core, proximal hip and LE for self care, mobility, lifting, and ambulation/stair navigation    [] UE / Cervical: cervical, postural,  scapular, scapulothoracic and UE control with self care, reaching, carrying, lifting, house/yardwork, driving, computer work    Manual Treatments:  PROM / STM / Oscillations-Mobs:  G-I, II, III, IV (PA's, Inf., Post.)  [] (02994) Provided manual therapy to mobilize LE, proximal hip and/or LS spine soft tissue/joints for the purpose of modulating pain, promoting relaxation,  increasing ROM, reducing/eliminating soft tissue swelling/inflammation/restriction, improving soft tissue extensibility and allowing for proper ROM for normal function with   [] LE / lumbar: self care, mobility, lifting and ambulation. [] UE / Cervical: self care, reaching, carrying, lifting, house/yardwork, driving, computer work. Modalities:  [x] (16869) Vasopneumatic compression: Utilized vasopneumatic compression to decrease edema / swelling for the purpose of improving mobility and quad tone / recruitment which will allow for increased overall function including but not limited to self-care, transfers, ambulation, and ascending / descending stairs.      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 Progressing: []? Met: []? Not Met: []? Adjusted     Therapist goals for Patient:   Short Term Goals: To be achieved in: 2 weeks  1. Independent in HEP and progression per patient tolerance, in order to prevent re-injury. []? Progressing: [x]? Met: []? Not Met: []? Adjusted  2. Patient will have a decrease in pain to facilitate improvement in movement, function, and ADLs as indicated by Functional Deficits. [x]? Progressing: []? Met: []? Not Met: []? Adjusted     Long Term Goals: To be achieved in: 5 weeks  1. FOTO score of at least 71 to assist with reaching prior level of function. [x]? Progressing: []? Met: []? Not Met: []? Adjusted  2. Patient will demonstrate increased L quad flexibility to 118 degrees or greater to reduce strain on tissues and lower pain levels for descending stairs. []? Progressing: [x]? Met: []? Not Met: []? Adjusted  3. Patient will demonstrate an increase in hip abd and ext strength to at least 4+/5 for improved tolerance of prolonged walking. [x]? Progressing: []? Met: []? Not Met: []? Adjusted  4. Patient will return to functional activities including tolerating ambulation for at least 20 minutes without difficulty to progress towards PLOF. []? Progressing: [x]? Met: []? Not Met: []? Adjusted  5. Patient to report a decrease in pain and stiffness by 50% when first waking up in the morning and transitioning out of bed. [x]? Progressing: []? Met: []? Not Met: []? Adjusted      Overall Progression Towards Functional goals/ Treatment Progress Update:  [x] Patient is progressing as expected towards functional goals listed.     [] Progression is slowed due to complexities/Impairments listed. [] Progression has been slowed due to co-morbidities. [] Plan just implemented, too soon to assess goals progression <30days   [] Goals require adjustment due to lack of progress  [] Patient is not progressing as expected and requires additional follow up with physician  [] Other    Persisting Functional Limitations/Impairments:  []Sleeping []Sitting               []Standing []Transfers        [x]Walking []Kneeling               []Stairs []Squatting / bending   []ADLs []Reaching  []Lifting  []Housework  []Driving [x]Job related tasks  []Sports/Recreation []Other:        ASSESSMENT: See above      Treatment/Activity Tolerance:  [x] Patient able to complete tx  [] Patient limited by fatigue  [] Patient limited by pain  [] Patient limited by other medical complications  [] Other:     Prognosis: [] Good [x] Fair  [] Poor    Patient Requires Follow-up: [x] Yes  [] No    Plan for next treatment session:Stretching, quad strengthening, hip strengthening    PLAN: See eval. PT 2x / week for 4-5 weeks. [x] Continue per plan of care [] Alter current plan (see comments)  [] Plan of care initiated [] Hold pending MD visit [] Discharge    Electronically signed by: Patrick Sofia PT  DPT  Therapist was present, directed the patient's care, made skilled judgement, and was responsible for assessment and treatment of the patient. -January Valenzuela, SPT        Note: If patient does not return for scheduled/ recommended follow up visits, this note will serve as a discharge from care along with most recent update on progress.

## 2022-05-24 ENCOUNTER — HOSPITAL ENCOUNTER (OUTPATIENT)
Dept: PHYSICAL THERAPY | Age: 67
Setting detail: THERAPIES SERIES
Discharge: HOME | End: 2022-05-24
Payer: MEDICARE

## 2022-05-24 PROCEDURE — 97110 THERAPEUTIC EXERCISES: CPT

## 2022-05-24 NOTE — FLOWSHEET NOTE
[facility] - Outpatient Physical Therapy  Phone: [phone]   Fax: [phone]    Physical Therapy Daily Treatment Note  Date:  2022    Patient Name:  Hebert Kern    :  1955  MRN: [id number]  Medical/Treatment Diagnosis Information:  Diagnosis: M17.12 - Patellofemoral arthritis of left knee  Treatment Diagnosis: L knee pain, hip weakness, decreased quad flexibility, impaired gait  Insurance/Certification information:  PT Insurance Information: MEDICARE   Physician Information:    Madeline Casiano MD  Plan of care signed (Y/N): [x]  Yes []  No     Date of Patient follow up with Physician:      Progress Report: []  Yes  [x]  No     Date Range for reporting period:  Beginnin22 - signed  POC: 22  Ending:     Progress report due (10 Rx/or 30 days whichever is less): visit #20 or 3/96/68     Recertification due (POC duration/ or 90 days whichever is less): visit #16 or 22     Visit # Insurance Allowable Auth required? Date Range   10/10 + 0 Medical necessity []  Yes  [x]  No n/a     Latex Allergy:  [x]NO      []YES  Preferred Language for Healthcare:   [x]English       []other:    Functional Scale:           Date assessed:  FOTO physical FS primary measure score = 63; risk adjusted = 61  22  FOTO physical FS primary measure score = 63     22    Pain level:  3/10     SUBJECTIVE: Pt reports more stiffness this date due to sitting for a long time at work earlier today.      OBJECTIVE:   : Pt 12 minutes late this date due to traffic  : Leg swelling, measured at patellar base: 39 cm R, 41 cm L  : Leg swelling, measured at patellar base: 38.8 cm, 42.5 cm L  : Leg swelling, measured at patellar base: 39 cm R, 41 cm L    :   *Done w/o knee brace    AROM     L R   Hip Flexion       Hip Abduction       Hip ER       Hip IR       Knee Flexion 122  135    Knee Extension 0 deg  +7 hyperextend    Dorsiflexion  9 deg  8 deg Plantarflexion        Inversion        Eversion        *L ankle resting position is 10 deg inversion     Strength (0-5) / Myotomes Left Right   Hip Flexion - supine 4+ 4   Hip Flexion - seated (L1-2)       Hip Abduction 4 4+   Hip Extension 4- 4-   Hip ER       Hip IR       Quads (L2-4) 4+ 5   Hamstrings       Ankle Dorsiflexion (L4-5)       Ankle Plantarflexion (S1-2)       Ankle Inversion       Ankle Eversion (S1-2)       Great Toe Extension (L5)               Flexibility       Hamstrings (90/90) Lack 12  Lack <10   ITB (Shan)       Quads (Ely's) 118 129   Hip Flexor Vance Actis)       Piriformis  Less than mild limitation Mod limitation     Balance: unable to complete heel or toe walking without compensation     Stairs: able to climb 3 x 6 inch steps with 1 HR, reciprocal pattern but decreased eccentric quad control with descent as of 5/20 this is improving but pt still demonstrates decreased eccentric quad control     RESTRICTIONS/PRECAUTIONS: None     Exercises/Interventions:     Therapeutic Exercises (99870) Resistance / level Sets/sec Reps Notes   Nustep 5 5'     IB  2x30\"    HR  2 10    HSS  30\" B  seated   Prone quad stretch       4/30: HEP   Supine ITB and glute stretch with strap      LAQ in seated 2#  Cues for slow speed   Seated hip ER, IR Blue TB  10 B   Hip abd sliders  2 10 B 5/16: added slight knee bend on R side   Hip ext sliders  2 10 B 5/16: added slight knee bend on R side   Mini squats Blue TB around knees 2 10// bars   Hip abd SLR 2# ankle weight   Stance leg on 2\" step   4/30: HEP   Supine clamshells Blue TB  HEP   Bridges   HEP          Leg extension - double legs 30#    Leg extension - eccentric, up 2, down 1 30#    HS curls 50#    Leg press - double 110#    Leg press - single leg 55#    Sidelying Clamshells Lime TB   HEP   Sidelying hip abduction     POC completed this date, see above 5/20      Sidelying hip circles   10 L 5/24:  Added to HEP   Supine hip adduction with ball  5\" holds 10 Cues for TA activation   3 way hip  BlueTB  10 ea Fwd, abd, ext, stance leg on 2\" step          Therapeutic Activities (41605)       STS with KB 10#    [place] carry 20# KB    Ambulation     Eccentric sit downs - plinth at 21.75\" 7.5# KB 2 10 5/24: Billed as TE                               Neuromuscular Re-ed (69668)       Fwd step down 6\" B UE use   Lateral step ups 6\" B UE use   Side steps Blue TB  5 lapsDone at // bars   Tiltboard taps - A/P, M/L     Tiltboard balance - A/P, M/L     Balance  -tandem  -NBOS, twisting R/L with ball taps  -ball toss   Airex    2kg ball    Step ups onto airex       Fwd Step ups 6\"             Manual Intervention (34676)       STM with HawkGrips #8 to B HS tendon insertion       STM to L QL and glute med, ITB and glute med also done in sidelying                                       Modalities:   5/12: Gameready on mod setting to L knee in long sitting x 15 min at the end of the session  Pre-vaso: L suprapatellar: 42.5 cm  Post-vaso: L suprapatellar: 41 cm    5/20: Gameready on mod setting to L knee in long sitting x 15 min at the end of the session  Pre-vaso: L suprapatellar: 41 cm   Post-vaso: L suprapatellar: 42.5 cm    Pt. Education:  4/22: patient educated on diagnosis, prognosis and expectations for rehab. Discussion today included: What gym equipment to use and to be conscious of form including not letting knees bow or hyperextend. Suggested adding hip abduction machine to routine and continuing as long as not painful. Also discussed that PT will not solve patient's issue but can help improve flexibility, strength/stability.   -all patient questions were answered  4/28: patient educated on adding eccentric components to HS curls and knee extension machines. Home Exercise Program:    Access Code: [email or web address]  URL: [email or web address]  Date: 04/30/2022  Prepared by: Brigido 42 with Resistance - 1 x daily - 7 x weekly - 2 sets - 10 reps  Supine Bridge - 1 x daily - 7 x weekly - 2 sets - 10 reps  Standing Hip Abduction with Counter Support - 1 x daily - 7 x weekly - 2 sets - 10 reps  Prone Quadriceps Stretch with Strap - 1 x daily - 7 x weekly - 1 sets - 3 reps - 30 hold    5/6: Pt educated on shifting from hooklying clamshells to sidelying clamshells, no HO provided    5/20: Added seated piriformis stretch, quadruped hip extensions, and fire hydrants, no HO provided    5/24: Added sidelying hip circles, no HO given    Therapeutic Exercise and NMR EXR  [x] (45021) Provided verbal/tactile cueing for activities related to strengthening, flexibility, endurance, ROM for improvements in  [x] LE / Lumbar: LE, proximal hip, and core control with self care, mobility, lifting, ambulation. [] UE / Cervical: cervical, postural, scapular, scapulothoracic and UE control with self care, reaching, carrying, lifting, house/yardwork, driving, computer work. [x] (75003) Provided verbal/tactile cueing for activities related to improving balance, coordination, kinesthetic sense, posture, motor skill, proprioception to assist with   [x] LE / lumbar: LE, proximal hip, and core control in self care, mobility, lifting, ambulation and eccentric single leg control. [] UE / cervical: cervical, scapular, scapulothoracic and UE control with self care, reaching, carrying, lifting, house/yardwork, driving, computer work.   [] (26367) Therapist is in constant attendance of 2 or more patients providing skilled therapy interventions, but not providing any significant amount of measurable one-on-one time to either patient, for improvements in  [] LE / lumbar: LE, proximal hip, and core control in self care, mobility, lifting, ambulation and eccentric single leg control. [] UE / cervical: cervical, scapular, scapulothoracic and UE control with self care, reaching, carrying, lifting, house/yardwork, driving, computer work.      NMR and Therapeutic Activities:    [] (59241 or 53758) Provided verbal/tactile cueing for activities related to improving balance, coordination, kinesthetic sense, posture, motor skill, proprioception and motor activation to allow for proper function of   [] LE: / Lumbar core, proximal hip and LE with self care and ADLs  [] UE / Cervical: cervical, postural, scapular, scapulothoracic and UE control with self care, carrying, lifting, driving, computer work.   [] (87600) Gait Re-education- Provided training and instruction to the patient for proper LE, core and proximal hip recruitment and positioning and eccentric body weight control with ambulation re-education including up and down stairs     Home Management Training / Self Care:  [] (39804) Provided self-care/home management training related to activities of daily living and compensatory training, and/or use of adaptive equipment for improvement with: ADLs and compensatory training, meal preparation, safety procedures and instruction in use of adaptive equipment, including bathing, grooming, dressing, personal hygiene, basic household cleaning and chores.      Home Exercise Program:    [] (83535) Reviewed/Progressed HEP activities related to strengthening, flexibility, endurance, ROM of   [] LE / Lumbar: core, proximal hip and LE for functional self-care, mobility, lifting and ambulation/stair navigation   [] UE / Cervical: cervical, postural, scapular, scapulothoracic and UE control with self care, reaching, carrying, lifting, house/yardwork, driving, computer work  [] (86856)Reviewed/Progressed HEP activities related to improving balance, coordination, kinesthetic sense, posture, motor skill, proprioception of   [] LE: core, proximal hip and LE for self care, mobility, lifting, and ambulation/stair navigation    [] UE / Cervical: cervical, postural,  scapular, scapulothoracic and UE control with self care, reaching, carrying, lifting, house/yardwork, driving, computer work    Manual Treatments:  PROM / STM / Oscillations-Mobs:  G-I, II, III, IV (PA's, Inf., Post.)  [] (10744) Provided manual therapy to mobilize LE, proximal hip and/or LS spine soft tissue/joints for the purpose of modulating pain, promoting relaxation,  increasing ROM, reducing/eliminating soft tissue swelling/inflammation/restriction, improving soft tissue extensibility and allowing for proper ROM for normal function with   [] LE / lumbar: self care, mobility, lifting and ambulation. [] UE / Cervical: self care, reaching, carrying, lifting, house/yardwork, driving, computer work. Modalities:  [] (92105) Vasopneumatic compression: Utilized vasopneumatic compression to decrease edema / swelling for the purpose of improving mobility and quad tone / recruitment which will allow for increased overall function including but not limited to self-care, transfers, ambulation, and ascending / descending stairs. 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 Progressing: []? Met: []? Not Met: []? Adjusted     Therapist goals for Patient:   Short Term Goals: To be achieved in: 2 weeks  1. Independent in HEP and progression per patient tolerance, in order to prevent re-injury. []? Progressing: [x]? Met: []? Not Met: []? Adjusted  2.  Patient will have a decrease in pain to facilitate abductor strengthening this date. Added sideyling hip circles to HEP to progress hip strengthening at home. Pt required verbal cues to correct trunk position in mini squats. Pt walking with more of a lateral trunk lean and limping slightly this date. Pt also had a small LOB when his knee gave out at the end of the session, but pt was able to recover without aid from PT. Pt will continue to benefit from therapy to improve his hip strength so he can improve his gait for daily activities. Treatment/Activity Tolerance:  [x] Patient able to complete tx  [] Patient limited by fatigue  [] Patient limited by pain  [] Patient limited by other medical complications  [] Other:     Prognosis: [] Good [x] Fair  [] Poor    Patient Requires Follow-up: [x] Yes  [] No    Plan for next treatment session:Stretching, quad strengthening, hip strengthening    PLAN: See eval. PT 2x / week for 4-5 weeks. [x] Continue per plan of care [] Alter current plan (see comments)  [] Plan of care initiated [] Hold pending MD visit [] Discharge    Electronically signed by: Bhavya Garsia, PT  DPT  Therapist was present, directed the patient's care, made skilled judgement, and was responsible for assessment and treatment of the patient. -January Valenzuela, SPT        Note: If patient does not return for scheduled/ recommended follow up visits, this note will serve as a discharge from care along with most recent update on progress.

## 2022-05-27 ENCOUNTER — HOSPITAL ENCOUNTER (OUTPATIENT)
Dept: PHYSICAL THERAPY | Age: 67
Setting detail: THERAPIES SERIES
Discharge: HOME | End: 2022-05-27
Payer: MEDICARE

## 2022-05-27 PROCEDURE — 97112 NEUROMUSCULAR REEDUCATION: CPT

## 2022-05-27 PROCEDURE — 97110 THERAPEUTIC EXERCISES: CPT

## 2022-06-02 ENCOUNTER — OFFICE VISIT (OUTPATIENT)
Dept: ORTHOPEDIC SURGERY | Age: 67
End: 2022-06-02
Payer: MEDICARE

## 2022-06-02 VITALS — WEIGHT: 185 LBS | BODY MASS INDEX: 29.03 KG/M2 | HEIGHT: 67 IN

## 2022-06-02 DIAGNOSIS — M17.12 PRIMARY OSTEOARTHRITIS OF LEFT KNEE: Primary | ICD-10-CM

## 2022-06-02 PROCEDURE — 1036F TOBACCO NON-USER: CPT | Performed by: ORTHOPAEDIC SURGERY

## 2022-06-02 PROCEDURE — 99214 OFFICE O/P EST MOD 30 MIN: CPT | Performed by: ORTHOPAEDIC SURGERY

## 2022-06-02 PROCEDURE — G8427 DOCREV CUR MEDS BY ELIG CLIN: HCPCS | Performed by: ORTHOPAEDIC SURGERY

## 2022-06-02 PROCEDURE — 1123F ACP DISCUSS/DSCN MKR DOCD: CPT | Performed by: ORTHOPAEDIC SURGERY

## 2022-06-02 PROCEDURE — 3017F COLORECTAL CA SCREEN DOC REV: CPT | Performed by: ORTHOPAEDIC SURGERY

## 2022-06-02 PROCEDURE — G8417 CALC BMI ABV UP PARAM F/U: HCPCS | Performed by: ORTHOPAEDIC SURGERY

## 2022-06-02 NOTE — LETTER
[facility]  [address] [facility] [address]  Phone: [phone]  Fax: [phone]    Madeline Casiano MD    June 2, 2022     [facility] [address] [place] [address]    Patient: Hebert Kern   MR Number: [id number]   YOB: 1955   Date of Visit: 6/2/2022       Dear Norma Fong:    Thank you for referring Katherine Lyn to me for evaluation/treatment. Below are the relevant portions of my assessment and plan of care. If you have questions, please do not hesitate to call me. I look forward to following Dimas Gonsalves along with you.     Sincerely,      Madeline Casiano MD

## 2022-06-02 NOTE — PROGRESS NOTES
Chief Complaint  Knee Pain (F/U L KNEE)      History of Present Illness:  Timur East is a pleasant 77 y.o. male who is here for 3 month reassessment post left knee aspiration and cortisone/Durolane injection. Unfortunately, it provided him with minimal relief. He still has 3 out of 10 sharp catching discomfort which is worse with standing and walking. 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 Between the brace and PT, he does admit to having less feelings of instability. However, the pain is still quite limiting. He notes that when he goes to the gym he actually has no complaints with any strengthening or sitting. He has the most pain upon walking, early in the morning and in the evening after a long day. Medical History:  Patient's medications, allergies, past medical, surgical, social and family histories were reviewed and updated as appropriate. Pain Assessment  Location of Pain: Knee  Location Modifiers: Left  Severity of Pain: 3  Quality of Pain: Aching  Frequency of Pain: Intermittent  Aggravating Factors: Walking  Limiting Behavior: Some  Relieving Factors: Ice  Result of Injury: No  Work-Related Injury: No  Are there other pain locations you wish to document?: No  ROS: Review of systems reviewed from Patient History Form completed today and available in the patient's chart under the Media tab. Pertinent items are noted in HPI  Review of systems reviewed from Patient History Form completed today and available in the patient's chart under the Media tab. Vital Signs:  Ht 5' 7\" (1.702 m)   Wt 185 lb (83.9 kg)   BMI 28.98 kg/m²         Neuro: Alert & oriented x 3,  normal,  no focal deficits noted. Normal affect. Eyes: sclera clear  Ears: Normal external ear  Mouth:  No perioral lesions  Pulm: Respirations unlabored and regular  Pulse: Extremities well perfused.  2+ peripheral pulses. Skin: Warm. No ulcerations. Constitutional: The physical examination finds the patient to be well-developed and well-nourished. The patient is alert and oriented x3 and was cooperative throughout the visit. LEFT knee exam    Gait: No use of assistive devices. No antalgic gait. Alignment: normal alignment. Inspection/skin: Skin is intact without erythema or ecchymosis. No gross deformity. Palpation: mild crepitus. +medial joint line tenderness present. Range of Motion: There is nearly  full range of motion with a  degree flexion arc. Strength: Normal quadriceps development. Effusion: moderate effusion swelling present. Ligamentous stability: No cruciate or collateral ligament instability. Neurologic and vascular: Skin is warm and well-perfused. Sensation is intact to light-touch. Special tests: Negative Tala sign. Radiology:       Old xrays reviewed and showed advanced medial compartment PF compartment narrowing. Assessment: Patient is a 77 y.o. male with recurrent left knee pain, stiffness, swelling and mechanical symptoms from advanced varus/PF OA. This has persisted despite an aspiration and combined cortisone/steroid and a durolane injection into his left knee 3 and 6  months ago. At this time, he is interested in pursuing an operative management for his knee to get him back to doing the things he wants to do. Impression:  Visit Diagnoses       Codes    Primary osteoarthritis of left knee    -  Primary M17.12          Office Procedures:  No orders of the defined types were placed in this encounter. Orders Placed This Encounter   Procedures   Inderjit Grady MD, Orthopedic Surgery, [facility]     Referral Priority:   Routine     Referral Type:   Eval and Treat     Referral Reason:   Specialty Services Required     Requested Specialty:   Orthopedic Surgery     Number of Visits Requested:   1       Plan:   We believe this patient is now a candidate for operative management of his left knee. He will finish a few more sessions of physical therapy and continue to wear his off  brace. We will send a referral to Dr. Vanessa Celaya, however the patient does state he knows Dr. Yaya Morrison at [facility] and would like his opinion. We recommend Dr Jason Viveros expertise at this time. All the patient's questions were answered while in the clinic. The patient is understanding of all instructions and agrees with the plan. Approximately 30 minutes was spent on patient education and coordinating care. Follow up in: Return if symptoms worsen or fail to improve. Sincerely,  VinnyROOPA   On behalf of:     Jaguar Bustamante MD [address] [address]  Email: [email or web address]  Office: [phone]    06/02/22  3:54 PM    The encounter with Mick Baljeet was carried out by myself, Dr Rajan Fitch, who personally examined the patient and reviewed the plan. This dictation was performed with a verbal recognition program (DRAGON) and it was checked for errors. It is possible that there are still dictated errors within this office note. If so, please bring any errors to my attention for an addendum. All efforts were made to ensure that this office note is accurate.

## 2022-06-03 ENCOUNTER — HOSPITAL ENCOUNTER (OUTPATIENT)
Dept: PHYSICAL THERAPY | Age: 67
Setting detail: THERAPIES SERIES
Discharge: HOME | End: 2022-06-03
Payer: MEDICARE

## 2022-06-03 PROCEDURE — 97110 THERAPEUTIC EXERCISES: CPT

## 2022-06-03 PROCEDURE — 97112 NEUROMUSCULAR REEDUCATION: CPT

## 2022-06-03 NOTE — FLOWSHEET NOTE
[address] [facility] Physical Therapy  Phone: [phone]   Fax: [phone]    Physical Therapy Daily Treatment Note  Date:  6/3/2022    Patient Name:  Geraldo Bernal    :  1955  MRN: [id number]  Medical/Treatment Diagnosis Information:  Diagnosis: M17.12 - Patellofemoral arthritis of left knee  Treatment Diagnosis: L knee pain, hip weakness, decreased quad flexibility, impaired gait  Insurance/Certification information:  PT Insurance Information: MEDICARE   Physician Information:    Elvin Mendoza MD  Plan of care signed (Y/N): [x]  Yes []  No     Date of Patient follow up with Physician:      Progress Report: []  Yes  [x]  No     Date Range for reporting period:  Beginnin22 - signed  POC: 22 - signed   Ending:     Progress report due (10 Rx/or 30 days whichever is less): visit #16 or      Recertification due (POC duration/ or 90 days whichever is less): visit #16 or 22     Visit # Insurance Allowable Auth required? Date Range   10/10 + 2 Medical necessity []  Yes  [x]  No n/a     Latex Allergy:  [x]NO      []YES  Preferred Language for Healthcare:   [x]English       []other:    Functional Scale:           Date assessed:  FOTO physical FS primary measure score = 63; risk adjusted = 61  22  FOTO physical FS primary measure score = 63     22    Pain level:  3/10     SUBJECTIVE: Pt reports everything has been mostly the same. Saw his MD this week, and Dr Wendy Travis recommended that he meet with a Dr regarding possible TKA later this month.     OBJECTIVE:   : Pt 12 minutes late this date due to traffic  : Leg swelling, measured at patellar base: 39 cm R, 41 cm L  : Leg swelling, measured at patellar base: 38.8 cm, 42.5 cm L  : Leg swelling, measured at patellar base: 39 cm R, 41 cm L    :   *Done w/o knee brace    AROM     L R   Hip Flexion       Hip Abduction       Hip ER       Hip IR       Knee Flexion 122  135 Knee Extension 0 deg  +7 hyperextend    Dorsiflexion  9 deg  8 deg    Plantarflexion        Inversion        Eversion        *L ankle resting position is 10 deg inversion     Strength (0-5) / Myotomes Left Right   Hip Flexion - supine 4+ 4   Hip Flexion - seated (L1-2)       Hip Abduction 4 4+   Hip Extension 4- 4-   Hip ER       Hip IR       Quads (L2-4) 4+ 5   Hamstrings       Ankle Dorsiflexion (L4-5)       Ankle Plantarflexion (S1-2)       Ankle Inversion       Ankle Eversion (S1-2)       Great Toe Extension (L5)               Flexibility       Hamstrings (90/90) Lack 12  Lack <10   ITB (Shan)       Quads (Ely's) 118 129   Hip Flexor Rayetta Cozier)       Piriformis  Less than mild limitation Mod limitation     Balance: unable to complete heel or toe walking without compensation     Stairs: able to climb 3 x 6 inch steps with 1 HR, reciprocal pattern but decreased eccentric quad control with descent as of 5/20 this is improving but pt still demonstrates decreased eccentric quad control     RESTRICTIONS/PRECAUTIONS: None     Exercises/Interventions:     Therapeutic Exercises (85585) Resistance / level Sets/sec Reps Notes   Nustep 6 5'     IB  2x30\"    HR/TR   10 ea    HSS   seated   Supine piriformis stretch      Prone quad stretch       4/30: HEP   Supine ITB and glute stretch with strap      LAQ in seated 2#  Cues for slow speed   Seated hip ER, IR Blue TB     Hip abd sliders  5/16: added slight knee bend on R side   Hip ext sliders  5/16: added slight knee bend on R side   Mini squats  2 105/27 progressed to no UE support   Hip abd SLR 2# ankle weight   Stance leg on 2\" step   4/30: HEP   Supine clamshells Blue TB  HEP   Bridges   HEP          Leg extension - double legs 30#    Leg extension - eccentric, up 2, down 1 30#    HS curls 50#    Leg press - double 110#    Leg press - single leg 55#    Sidelying Clamshells Lime TB   HEP   Sidelying hip abduction     POC completed this date, see above 5/20      Sidelying hip circles    5/24: Added to HEP   Supine hip adduction with ball  5\" holds  Cues for TA activation   3 way hip  BlueTB  10 ea Fwd, abd, ext, stance leg on 2\" step          Therapeutic Activities (31480)       STS with KB 10#    Vanderwagen carry 20# KB    Ambulation  5/27: Billed as TE   Eccentric sit downs - plinth at 21.75\" 7.5# KB 5/24: Billed as TE                               Neuromuscular Re-ed (17327)       Fwd step down 6\" 2 10 B B UE use   Lateral step ups 6\" 2 10 B B UE use   Side steps Blue TB  Done at // bars   Tiltboard taps - A/P, M/L     Tiltboard balance - A/P, M/L     Balance  -tandem  -NBOS, twisting R/L with ball taps  -ball toss  - Shoulder flex/ext with ball Airex    3kg ball    3kg ball    Step ups onto airex       Fwd Step ups 6\"      Biodex  - weight shift training  - motor control training   L 5  L 5    4 min  29 sec     Results= 85%   Biodex recovery rapids L 5/ Easy  4 min Points: 1574  Distance: 640m  Bottles: 31          Manual Intervention (11607)       STM with HawkGrips #8 to B HS tendon insertion       STM to L QL and glute med, ITB and glute med also done in sidelying                                       Modalities:   5/12: Gameready on mod setting to L knee in long sitting x 15 min at the end of the session  Pre-vaso: L suprapatellar: 42.5 cm  Post-vaso: L suprapatellar: 41 cm    5/20: Gameready on mod setting to L knee in long sitting x 15 min at the end of the session  Pre-vaso: L suprapatellar: 41 cm   Post-vaso: L suprapatellar: 42.5 cm    Pt. Education:  4/22: patient educated on diagnosis, prognosis and expectations for rehab. Discussion today included: What gym equipment to use and to be conscious of form including not letting knees bow or hyperextend. Suggested adding hip abduction machine to routine and continuing as long as not painful.  Also discussed that PT will not solve patient's issue but can help improve flexibility, strength/stability.   -all patient questions were answered  4/28: patient educated on adding eccentric components to HS curls and knee extension machines. Home Exercise Program:    Access Code: [email or web address]  URL: [email or web address]  Date: 04/30/2022  Prepared by: Brigido 42 with Resistance - 1 x daily - 7 x weekly - 2 sets - 10 reps  Supine Bridge - 1 x daily - 7 x weekly - 2 sets - 10 reps  Standing Hip Abduction with Counter Support - 1 x daily - 7 x weekly - 2 sets - 10 reps  Prone Quadriceps Stretch with Strap - 1 x daily - 7 x weekly - 1 sets - 3 reps - 30 hold    5/6: Pt educated on shifting from hooklying clamshells to sidelying clamshells, no HO provided    5/20: Added seated piriformis stretch, quadruped hip extensions, and fire hydrants, no HO provided    5/24: Added sidelying hip circles, no HO given    Therapeutic Exercise and NMR EXR  [x] (81061) Provided verbal/tactile cueing for activities related to strengthening, flexibility, endurance, ROM for improvements in  [x] LE / Lumbar: LE, proximal hip, and core control with self care, mobility, lifting, ambulation. [] UE / Cervical: cervical, postural, scapular, scapulothoracic and UE control with self care, reaching, carrying, lifting, house/yardwork, driving, computer work. [x] (61438) Provided verbal/tactile cueing for activities related to improving balance, coordination, kinesthetic sense, posture, motor skill, proprioception to assist with   [x] LE / lumbar: LE, proximal hip, and core control in self care, mobility, lifting, ambulation and eccentric single leg control.    [] UE / cervical: cervical, scapular, scapulothoracic and UE control with self care, reaching, carrying, lifting, house/yardwork, driving, computer work.   [] (89941) Therapist is in constant attendance of 2 or more patients providing skilled therapy interventions, but not providing any significant amount of measurable one-on-one time to either patient, for improvements in  [] LE / lumbar: LE, proximal hip, and core control in self care, mobility, lifting, ambulation and eccentric single leg control. [] UE / cervical: cervical, scapular, scapulothoracic and UE control with self care, reaching, carrying, lifting, house/yardwork, driving, computer work. NMR and Therapeutic Activities:    [x] (87181 or 45640) Provided verbal/tactile cueing for activities related to improving balance, coordination, kinesthetic sense, posture, motor skill, proprioception and motor activation to allow for proper function of   [x] LE: / Lumbar core, proximal hip and LE with self care and ADLs  [] UE / Cervical: cervical, postural, scapular, scapulothoracic and UE control with self care, carrying, lifting, driving, computer work.   [] (30156) Gait Re-education- Provided training and instruction to the patient for proper LE, core and proximal hip recruitment and positioning and eccentric body weight control with ambulation re-education including up and down stairs     Home Management Training / Self Care:  [] (02336) Provided self-care/home management training related to activities of daily living and compensatory training, and/or use of adaptive equipment for improvement with: ADLs and compensatory training, meal preparation, safety procedures and instruction in use of adaptive equipment, including bathing, grooming, dressing, personal hygiene, basic household cleaning and chores.      Home Exercise Program:    [] (78936) Reviewed/Progressed HEP activities related to strengthening, flexibility, endurance, ROM of   [] LE / Lumbar: core, proximal hip and LE for functional self-care, mobility, lifting and ambulation/stair navigation   [] UE / Cervical: cervical, postural, scapular, scapulothoracic and UE control with self care, reaching, carrying, lifting, house/yardwork, driving, computer work  [] (22013)Reviewed/Progressed HEP activities related to improving balance, coordination, kinesthetic sense, posture, motor skill, proprioception of   [] LE: core, proximal hip and LE for self care, mobility, lifting, and ambulation/stair navigation    [] UE / Cervical: cervical, postural,  scapular, scapulothoracic and UE control with self care, reaching, carrying, lifting, house/yardwork, driving, computer work    Manual Treatments:  PROM / STM / Oscillations-Mobs:  G-I, II, III, IV (PA's, Inf., Post.)  [] (92907) Provided manual therapy to mobilize LE, proximal hip and/or LS spine soft tissue/joints for the purpose of modulating pain, promoting relaxation,  increasing ROM, reducing/eliminating soft tissue swelling/inflammation/restriction, improving soft tissue extensibility and allowing for proper ROM for normal function with   [] LE / lumbar: self care, mobility, lifting and ambulation. [] UE / Cervical: self care, reaching, carrying, lifting, house/yardwork, driving, computer work. Modalities:  [] (03964) Vasopneumatic compression: Utilized vasopneumatic compression to decrease edema / swelling for the purpose of improving mobility and quad tone / recruitment which will allow for increased overall function including but not limited to self-care, transfers, ambulation, and ascending / descending stairs. 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 Progressing: []? Met: []? Not Met: []? Adjusted     Therapist goals for Patient:   Short Term Goals: To be achieved in: 2 weeks  1. Independent in HEP and progression per patient tolerance, in order to prevent re-injury. []? Progressing: [x]? Met: []? Not Met: []? Adjusted  2. Patient will have a decrease in pain to facilitate improvement in movement, function, and ADLs as indicated by Functional Deficits. [x]? Progressing: []? Met: []? Not Met: []? Adjusted     Long Term Goals: To be achieved in: 5 weeks  1. FOTO score of at least 71 to assist with reaching prior level of function. [x]? Progressing: []? Met: []? Not Met: []? Adjusted  2. Patient will demonstrate increased L quad flexibility to 118 degrees or greater to reduce strain on tissues and lower pain levels for descending stairs. []? Progressing: [x]? Met: []? Not Met: []? Adjusted  3. Patient will demonstrate an increase in hip abd and ext strength to at least 4+/5 for improved tolerance of prolonged walking. [x]? Progressing: []? Met: []? Not Met: []? Adjusted  4. Patient will return to functional activities including tolerating ambulation for at least 20 minutes without difficulty to progress towards PLOF. []? Progressing: [x]? Met: []? Not Met: []? Adjusted  5. Patient to report a decrease in pain and stiffness by 50% when first waking up in the morning and transitioning out of bed. [x]? Progressing: []? Met: []? Not Met: []? Adjusted      Overall Progression Towards Functional goals/ Treatment Progress Update:  [x] Patient is progressing as expected towards functional goals listed. [] Progression is slowed due to complexities/Impairments listed. [] Progression has been slowed due to co-morbidities.   [] Plan just implemented, too soon to assess goals progression <30days   [] Goals require adjustment due to lack of progress  [] Patient is not progressing as expected and requires additional follow up with physician  [] Other    Persisting Functional Limitations/Impairments:  []Sleeping []Sitting               []Standing []Transfers        [x]Walking []Kneeling               []Stairs []Squatting / bending   []ADLs []Reaching  []Lifting  []Housework  []Driving [x]Job related tasks  []Sports/Recreation []Other:        ASSESSMENT: Pt did well with balance and strengthening this date. Pt demonstrated improved eccentric quad control with the step downs. Required tactile cues to activate glutes correctly in 3 way hip ext. Pt to continue to benefit from PT to improve LE strength for improved tolerance to ambulation for work duties. Treatment/Activity Tolerance:  [x] Patient able to complete tx  [] Patient limited by fatigue  [] Patient limited by pain  [] Patient limited by other medical complications  [] Other:     Prognosis: [] Good [x] Fair  [] Poor    Patient Requires Follow-up: [x] Yes  [] No    Plan for next treatment session:Stretching, quad strengthening, hip strengthening    PLAN: See eval. PT 2x / week for 4-5 weeks. [x] Continue per plan of care [] Alter current plan (see comments)  [] Plan of care initiated [] Hold pending MD visit [] Discharge    Electronically signed by: Nathan Bauman, PT  DPT  Therapist was present, directed the patient's care, made skilled judgement, and was responsible for assessment and treatment of the patient. -January Valenzuela, SPT        Note: If patient does not return for scheduled/ recommended follow up visits, this note will serve as a discharge from care along with most recent update on progress.

## 2022-06-06 ENCOUNTER — TELEPHONE (OUTPATIENT)
Dept: ORTHOPEDIC SURGERY | Age: 67
End: 2022-06-06

## 2022-06-06 ENCOUNTER — HOSPITAL ENCOUNTER (OUTPATIENT)
Dept: PHYSICAL THERAPY | Age: 67
Setting detail: THERAPIES SERIES
Discharge: HOME | End: 2022-06-06
Payer: MEDICARE

## 2022-06-06 PROCEDURE — 97112 NEUROMUSCULAR REEDUCATION: CPT

## 2022-06-06 PROCEDURE — 97110 THERAPEUTIC EXERCISES: CPT
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
## 2022-06-10 ENCOUNTER — HOSPITAL ENCOUNTER (OUTPATIENT)
Dept: PHYSICAL THERAPY | Age: 67
Setting detail: THERAPIES SERIES
Discharge: HOME | End: 2022-06-10
Payer: MEDICARE

## 2022-06-10 PROCEDURE — 97112 NEUROMUSCULAR REEDUCATION: CPT

## 2022-06-10 PROCEDURE — 97110 THERAPEUTIC EXERCISES: CPT

## 2022-06-10 NOTE — FLOWSHEET NOTE
[facility]  Outpatient Physical Therapy  Phone: [phone]   Fax: [phone]    Physical Therapy Daily Treatment Note  Date:  6/10/2022    Patient Name:  Kimber Porter    :  1955  MRN: [id number]  Medical/Treatment Diagnosis Information:  Diagnosis: M17.12 - Patellofemoral arthritis of left knee  Treatment Diagnosis: L knee pain, hip weakness, decreased quad flexibility, impaired gait  Insurance/Certification information:  PT Insurance Information: MEDICARE   Physician Information:    Jeffry Hardin MD  Plan of care signed (Y/N): [x]  Yes []  No     Date of Patient follow up with Physician:      Progress Report: []  Yes  [x]  No     Date Range for reporting period:  Beginnin22 - signed  POC: 22 - signed   Ending:     Progress report due (10 Rx/or 30 days whichever is less): visit #16 or      Recertification due (POC duration/ or 90 days whichever is less): visit #16 or 22     Visit # Insurance Allowable Auth required? Date Range   10/10 +  Medical necessity []  Yes  [x]  No n/a     Latex Allergy:  [x]NO      []YES  Preferred Language for Healthcare:   [x]English       []other:    Functional Scale:           Date assessed:  FOTO physical FS primary measure score = 63; risk adjusted = 61  22  FOTO physical FS primary measure score = 63     22    Pain level:  3/10     SUBJECTIVE: Pt reports the left hip feels stiff, and he still has trouble straightening the leg in the morning.      OBJECTIVE:   : Pt 12 minutes late this date due to traffic  : Leg swelling, measured at patellar base: 39 cm R, 41 cm L  : Leg swelling, measured at patellar base: 38.8 cm, 42.5 cm L  : Leg swelling, measured at patellar base: 39 cm R, 41 cm L    :   *Done w/o knee brace    AROM     L R   Hip Flexion       Hip Abduction       Hip ER       Hip IR       Knee Flexion 122  135    Knee Extension 0 deg  +7 hyperextend    Dorsiflexion  9 deg  8 deg    Plantarflexion        Inversion        Eversion        *L ankle resting position is 10 deg inversion     Strength (0-5) / Myotomes Left Right   Hip Flexion - supine 4+ 4   Hip Flexion - seated (L1-2)       Hip Abduction 4 4+   Hip Extension 4- 4-   Hip ER       Hip IR       Quads (L2-4) 4+ 5   Hamstrings       Ankle Dorsiflexion (L4-5)       Ankle Plantarflexion (S1-2)       Ankle Inversion       Ankle Eversion (S1-2)       Great Toe Extension (L5)               Flexibility       Hamstrings (90/90) Lack 12  Lack <10   ITB (Shan)       Quads (Ely's) 118 129   Hip Flexor Thurnell Ades)       Piriformis  Less than mild limitation Mod limitation     Balance: unable to complete heel or toe walking without compensation     Stairs: able to climb 3 x 6 inch steps with 1 HR, reciprocal pattern but decreased eccentric quad control with descent as of 5/20 this is improving but pt still demonstrates decreased eccentric quad control     6/10 (measurements for ortho MD):    AROM     L R   Knee Flexion 132 135    Knee Extension +4 deg hyperextend +7 hyperextend    Dorsiflexion  10 deg  10 deg    *L ankle resting position is 10 deg inversion     Strength (0-5) / Myotomes Left Right   Hip Flexion - supine 5 4+   Hip Flexion - seated (L1-2)       Hip Abduction 5 4+   Hip Extension 4 4   Hip ER       Hip IR       Quads (L2-4) 5 5   Hamstrings               Flexibility       Hamstrings (90/90) Lack 14 Lack <10   ITB (Shan)       Quads (Ely's) 112 131   Hip Flexor Thurnell Ades)       Piriformis  WNL Mod limitation       RESTRICTIONS/PRECAUTIONS: None     Exercises/Interventions:     Therapeutic Exercises (85686) Resistance / level Sets/sec Reps Notes   Nustep 6 5'     IB  2x30\"    HR/TR   10 ea    HSS  30\" B seated   Supine piriformis stretch      Prone quad stretch       4/30: HEP   Supine ITB and glute stretch with strap      LAQ in seated 2#  Cues for slow speed   Seated hip ER, IR Blue TB     Hip abd sliders  5/16: added slight knee bend on R side   Hip ext sliders  5/16: added slight knee bend on R side   Mini squats Airex 5/27 progressed to no UE support   Hip abd SLR 2# ankle weight   Stance leg on 2\" step   4/30: HEP   Supine clamshells Blue TB  HEP   Bridges   HEP          Leg extension - double legs 30#    Leg extension - eccentric, up 2, down 1 30#    HS curls 50#    Leg press - double 110#    Leg press - single leg 55#    Sidelying Clamshells Lime TB   HEP   Sidelying hip abduction     POC completed this date, see above 5/20      Sidelying hip circles    5/24:  Added to HEP   Supine hip adduction with ball  5\" holds  Cues for TA activation   3 way hip  BlueTB   Fwd, abd, ext, stance leg on 2\" step   RDLs Touching 6\" step on its side    Measurements for MD, see above 6/10             Therapeutic Activities (30480)       STS with KB 10#    Rosita carry 20# KB    Ambulation  5/27: Billed as TE   Eccentric sit downs - plinth at 21.75\" 7.5# KB 5/24: Billed as TE                               Neuromuscular Re-ed (31507)       Fwd step down 6\" B UE use   Lateral step ups 6\" B UE use   Side steps Blue TB  Done at // bars   Tiltboard taps - A/P, M/L     Tiltboard balance - A/P, M/L     Balance  -tandem  -NBOS, twisting R/L with ball taps  -ball toss  - Shoulder flex/ext with ball Airex    3kg ball    3kg ball    Step ups onto airex       Fwd Step ups 6\"      Biodex  - weight shift training  - motor control training  - Maze control training      - Ball maze  - Random Control   L 5  L 5  L 5      L 5  L 5        1:51 min  3 min     Results= 85%  Results = 82%, 93%   Biodex recovery [place]ids L 5/Medium  4 min Points: 2190  Distance: 920m  Bottles: 49          Manual Intervention (06078)       STM with Doni #8 to B HS tendon insertion       STM to L QL and glute med, ITB and glute med also done in sidelying                                       Modalities:   5/12: Gameready on mod setting to L knee in long sitting x 15 min at the end of the session  Pre-vaso: L suprapatellar: 42.5 cm  Post-vaso: L suprapatellar: 41 cm    5/20: Gameready on mod setting to L knee in long sitting x 15 min at the end of the session  Pre-vaso: L suprapatellar: 41 cm   Post-vaso: L suprapatellar: 42.5 cm    Pt. Education:  4/22: patient educated on diagnosis, prognosis and expectations for rehab. Discussion today included: What gym equipment to use and to be conscious of form including not letting knees bow or hyperextend. Suggested adding hip abduction machine to routine and continuing as long as not painful. Also discussed that PT will not solve patient's issue but can help improve flexibility, strength/stability.   -all patient questions were answered  4/28: patient educated on adding eccentric components to HS curls and knee extension machines. Home Exercise Program:    Access Code: [email or web address]  URL: [email or web address]  Date: 04/30/2022  Prepared by: Brigido 42 with Resistance - 1 x daily - 7 x weekly - 2 sets - 10 reps  Supine Bridge - 1 x daily - 7 x weekly - 2 sets - 10 reps  Standing Hip Abduction with Counter Support - 1 x daily - 7 x weekly - 2 sets - 10 reps  Prone Quadriceps Stretch with Strap - 1 x daily - 7 x weekly - 1 sets - 3 reps - 30 hold    5/6: Pt educated on shifting from hooklying clamshells to sidelying clamshells, no HO provided    5/20: Added seated piriformis stretch, quadruped hip extensions, and fire hydrants, no HO provided    5/24: Added sidelying hip circles, no HO given    6/10: HO provided for all exercises above    Access Code: [email or web address]  URL: [email or web address]  Date: 06/10/2022  Prepared by: Zeke Harris    Exercises     Clamshell with Resistance - 1 x daily - 7 x weekly - 2 sets - 10 reps   Sidelying Hip Circles - 1 x daily - 7 x weekly - 2 sets - 10 reps   Quadruped Alternating Leg Extensions - 1 x daily - 7 x weekly - 2 sets - 10 reps   Quadruped Fire Hydrant - 1 x daily - 7 x weekly - 2 sets - 10 reps   Seated Piriformis Stretch with Trunk Bend - 1 x daily - 7 x weekly - 3 reps - 30 seconds hold    Therapeutic Exercise and NMR EXR  [x] (31592) Provided verbal/tactile cueing for activities related to strengthening, flexibility, endurance, ROM for improvements in  [x] LE / Lumbar: LE, proximal hip, and core control with self care, mobility, lifting, ambulation. [] UE / Cervical: cervical, postural, scapular, scapulothoracic and UE control with self care, reaching, carrying, lifting, house/yardwork, driving, computer work. [x] (39300) Provided verbal/tactile cueing for activities related to improving balance, coordination, kinesthetic sense, posture, motor skill, proprioception to assist with   [x] LE / lumbar: LE, proximal hip, and core control in self care, mobility, lifting, ambulation and eccentric single leg control. [] UE / cervical: cervical, scapular, scapulothoracic and UE control with self care, reaching, carrying, lifting, house/yardwork, driving, computer work.   [] (17184) Therapist is in constant attendance of 2 or more patients providing skilled therapy interventions, but not providing any significant amount of measurable one-on-one time to either patient, for improvements in  [] LE / lumbar: LE, proximal hip, and core control in self care, mobility, lifting, ambulation and eccentric single leg control. [] UE / cervical: cervical, scapular, scapulothoracic and UE control with self care, reaching, carrying, lifting, house/yardwork, driving, computer work.      NMR and Therapeutic Activities:    [x] (91034 or 07497) Provided verbal/tactile cueing for activities related to improving balance, coordination, kinesthetic sense, posture, motor skill, proprioception and motor activation to allow for proper function of   [x] LE: / Lumbar core, proximal hip and LE with self care and ADLs  [] UE / Cervical: cervical, postural, scapular, scapulothoracic and UE control with self care, carrying, lifting, driving, computer work.   [] (01876) Gait Re-education- Provided training and instruction to the patient for proper LE, core and proximal hip recruitment and positioning and eccentric body weight control with ambulation re-education including up and down stairs     Home Management Training / Self Care:  [] (68557) Provided self-care/home management training related to activities of daily living and compensatory training, and/or use of adaptive equipment for improvement with: ADLs and compensatory training, meal preparation, safety procedures and instruction in use of adaptive equipment, including bathing, grooming, dressing, personal hygiene, basic household cleaning and chores.      Home Exercise Program:    [] (52620) Reviewed/Progressed HEP activities related to strengthening, flexibility, endurance, ROM of   [] LE / Lumbar: core, proximal hip and LE for functional self-care, mobility, lifting and ambulation/stair navigation   [] UE / Cervical: cervical, postural, scapular, scapulothoracic and UE control with self care, reaching, carrying, lifting, house/yardwork, driving, computer work  [] (64129)Reviewed/Progressed HEP activities related to improving balance, coordination, kinesthetic sense, posture, motor skill, proprioception of   [] LE: core, proximal hip and LE for self care, mobility, lifting, and ambulation/stair navigation    [] UE / Cervical: cervical, postural,  scapular, scapulothoracic and UE control with self care, reaching, carrying, lifting, house/yardwork, driving, computer work    Manual Treatments:  PROM / STM / Oscillations-Mobs:  G-I, II, III, IV (PA's, Inf., Post.)  [] (23162) Provided manual therapy to mobilize LE, proximal hip and/or LS spine soft tissue/joints for the purpose of modulating pain, promoting relaxation,  increasing ROM, reducing/eliminating soft tissue swelling/inflammation/restriction, improving soft tissue extensibility and allowing for proper ROM for normal function with   [] LE / lumbar: self care, mobility, lifting and ambulation. [] UE / Cervical: self care, reaching, carrying, lifting, house/yardwork, driving, computer work. Modalities:  [] (85211) Vasopneumatic compression: Utilized vasopneumatic compression to decrease edema / swelling for the purpose of improving mobility and quad tone / recruitment which will allow for increased overall function including but not limited to self-care, transfers, ambulation, and ascending / descending stairs. 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 Progressing: []? Met: []? Not Met: []? Adjusted     Therapist goals for Patient:   Short Term Goals: To be achieved in: 2 weeks  1. Independent in HEP and progression per patient tolerance, in order to prevent re-injury. []? Progressing: [x]? Met: []? Not Met: []? Adjusted  2. Patient will have a decrease in pain to facilitate improvement in movement, function, and ADLs as indicated by Functional Deficits. [x]? Progressing: []? Met: []? Not Met: []? Adjusted     Long Term Goals: To be achieved in: 5 weeks  1. FOTO score of at least 71 to assist with reaching prior level of function. [x]? Progressing: []? Met: []? Not Met: []? Adjusted  2.  Patient will demonstrate increased L quad flexibility to 118 degrees or greater to reduce strain on tissues and lower pain levels for descending stairs. []? Progressing: [x]? Met: []? Not Met: []? Adjusted  3. Patient will demonstrate an increase in hip abd and ext strength to at least 4+/5 for improved tolerance of prolonged walking. [x]? Progressing: []? Met: []? Not Met: []? Adjusted  4. Patient will return to functional activities including tolerating ambulation for at least 20 minutes without difficulty to progress towards PLOF. []? Progressing: [x]? Met: []? Not Met: []? Adjusted  5. Patient to report a decrease in pain and stiffness by 50% when first waking up in the morning and transitioning out of bed. [x]? Progressing: []? Met: []? Not Met: []? Adjusted      Overall Progression Towards Functional goals/ Treatment Progress Update:  [x] Patient is progressing as expected towards functional goals listed. [] Progression is slowed due to complexities/Impairments listed. [] Progression has been slowed due to co-morbidities. [] Plan just implemented, too soon to assess goals progression <30days   [] Goals require adjustment due to lack of progress  [] Patient is not progressing as expected and requires additional follow up with physician  [] Other    Persisting Functional Limitations/Impairments:  []Sleeping []Sitting               []Standing []Transfers        [x]Walking []Kneeling               []Stairs []Squatting / bending   []ADLs []Reaching  []Lifting  []Housework  []Driving [x]Job related tasks  []Sports/Recreation []Other:        ASSESSMENT: Measurements taken this date for pt to bring to ortho appt next week. Pt demonstrates improved hip strength and knee ROM. Pt with good compliance to HEP and regularly goes to the gym. Pt with improved weight shift strategies on Biodex this date. Pt to continue to benefit from therapy to improve his balance and endurance for improved tolerance to prolonged ambulation.       Treatment/Activity Tolerance:  [x] Patient able to complete tx  [] Patient limited by fatigue  [] Patient limited by pain  [] Patient limited by other medical complications  [] Other:     Prognosis: [] Good [x] Fair  [] Poor    Patient Requires Follow-up: [x] Yes  [] No    Plan for next treatment session:Stretching, quad strengthening, hip strengthening    PLAN: See eval. PT 2x / week for 4-5 weeks. [x] Continue per plan of care [] Alter current plan (see comments)  [] Plan of care initiated [] Hold pending MD visit [] Discharge    Electronically signed by: Frank Wesley, PT  DPT  Therapist was present, directed the patient's care, made skilled judgement, and was responsible for assessment and treatment of the patient. -January Valenzuela, SPT        Note: If patient does not return for scheduled/ recommended follow up visits, this note will serve as a discharge from care along with most recent update on progress.

## 2022-06-13 ENCOUNTER — HOSPITAL ENCOUNTER (OUTPATIENT)
Dept: PHYSICAL THERAPY | Age: 67
Setting detail: THERAPIES SERIES
Discharge: HOME | End: 2022-06-13
Payer: MEDICARE

## 2022-06-13 PROCEDURE — 97530 THERAPEUTIC ACTIVITIES: CPT

## 2022-06-13 PROCEDURE — 97110 THERAPEUTIC EXERCISES: CPT

## 2022-06-13 PROCEDURE — 97112 NEUROMUSCULAR REEDUCATION: CPT

## 2022-06-13 NOTE — FLOWSHEET NOTE
[facility]  Phone: [phone]   Fax: [phone]    Physical Therapy Daily Treatment Note  Date:  2022    Patient Name:  Gloria Perez    :  1955  MRN: [id number]  Medical/Treatment Diagnosis Information:  Diagnosis: M17.12 - Patellofemoral arthritis of left knee  Treatment Diagnosis: L knee pain, hip weakness, decreased quad flexibility, impaired gait  Insurance/Certification information:  PT Insurance Information: MEDICARE   Physician Information:    Charo Rowland MD  Plan of care signed (Y/N): [x]  Yes []  No     Date of Patient follow up with Physician:      Progress Report: []  Yes  [x]  No     Date Range for reporting period:  Beginnin22 - signed  POC: 22 - signed   Ending:     Progress report due (10 Rx/or 30 days whichever is less): visit #16 or      Recertification due (POC duration/ or 90 days whichever is less): visit #16 or 22     Visit # Insurance Allowable Auth required? Date Range   10/10 +  Medical necessity []  Yes  [x]  No n/a     Latex Allergy:  [x]NO      []YES  Preferred Language for Healthcare:   [x]English       []other:    Functional Scale:           Date assessed:  FOTO physical FS primary measure score = 63; risk adjusted = 61  22  FOTO physical FS primary measure score = 63     22    Pain level:  2.5/10     SUBJECTIVE: Pt reports pain is down to 2.5/10 after hot shower this morning. Pt had a busy weekend.      OBJECTIVE:   : Pt 12 minutes late this date due to traffic  : Leg swelling, measured at patellar base: 39 cm R, 41 cm L  : Leg swelling, measured at patellar base: 38.8 cm, 42.5 cm L  : Leg swelling, measured at patellar base: 39 cm R, 41 cm L    :   *Done w/o knee brace    AROM     L R   Hip Flexion       Hip Abduction       Hip ER       Hip IR       Knee Flexion 122  135    Knee Extension 0 deg  +7 hyperextend    Dorsiflexion  9 deg  8 deg Plantarflexion        Inversion        Eversion        *L ankle resting position is 10 deg inversion     Strength (0-5) / Myotomes Left Right   Hip Flexion - supine 4+ 4   Hip Flexion - seated (L1-2)       Hip Abduction 4 4+   Hip Extension 4- 4-   Hip ER       Hip IR       Quads (L2-4) 4+ 5   Hamstrings       Ankle Dorsiflexion (L4-5)       Ankle Plantarflexion (S1-2)       Ankle Inversion       Ankle Eversion (S1-2)       Great Toe Extension (L5)               Flexibility       Hamstrings (90/90) Lack 12  Lack <10   ITB (Shan)       Quads (Ely's) 118 129   Hip Flexor Tutu Bias)       Piriformis  Less than mild limitation Mod limitation     Balance: unable to complete heel or toe walking without compensation     Stairs: able to climb 3 x 6 inch steps with 1 HR, reciprocal pattern but decreased eccentric quad control with descent as of 5/20 this is improving but pt still demonstrates decreased eccentric quad control     6/10 (measurements for ortho MD):    AROM     L R   Knee Flexion 132 135    Knee Extension +4 deg hyperextend +7 hyperextend    Dorsiflexion  10 deg  10 deg    *L ankle resting position is 10 deg inversion     Strength (0-5) / Myotomes Left Right   Hip Flexion - supine 5 4+   Hip Flexion - seated (L1-2)       Hip Abduction 5 4+   Hip Extension 4 4   Hip ER       Hip IR       Quads (L2-4) 5 5   Hamstrings               Flexibility       Hamstrings (90/90) Lack 14 Lack <10   ITB (Shan)       Quads (Ely's) 112 131   Hip Flexor Tutu Bias)       Piriformis  WNL Mod limitation       RESTRICTIONS/PRECAUTIONS: None     Exercises/Interventions:     Therapeutic Exercises (83728) Resistance / level Sets/sec Reps Notes   Nustep 5 5'     IB  2x30\"    HR/TR   10 ea    HSS  30\" B seated   Supine piriformis stretch      Prone quad stretch       4/30: HEP   Supine ITB and glute stretch with strap      LAQ in seated 2#  Cues for slow speed   Seated hip ER, IR Blue TB     Hip abd sliders  1 10 B 5/16: added slight knee bend on R side   Hip ext sliders  1 10 B 5/16: added slight knee bend on R side   SL squat with hip cirlces - opp LE on glider  1 10 B    Mini squats Airex 5/27 progressed to no UE support   Hip abd SLR 2# ankle weight   Stance leg on 2\" step   4/30: HEP   Supine clamshells Blue TB  HEP   Bridges   HEP          Leg extension - double legs 30#    Leg extension - eccentric, up 2, down 1 30#    HS curls 50#    Leg press - double 110#    Leg press - single leg 55#    Sidelying Clamshells Lime TB   HEP   Sidelying hip abduction     POC completed this date, see above 5/20      Sidelying hip circles    5/24:  Added to HEP   Supine hip adduction with ball  5\" holds  Cues for TA activation   3 way hip  BlueTB   Fwd, abd, ext, stance leg on 2\" step   RDLs Touching 6\" step on its side 1 10    Leaning on elevated plinth:  - hip ext kicks  - hip abd kicks  - HS curls  - Hip ext pulses (keep LE straight) Plinth at highest level    10 B  10 B  10 B  10 B                                Therapeutic Activities (65031)       STS with KB 10# 2 10    [place] carry 20# KB    Ambulation  5/27: Billed as TE   Eccentric sit downs - plinth at 21.75\" 7.5# KB 5/24: Billed as TE          Qped:  - hip extension  - fire hydrant  - HS curl  - Hip ext pulses (keep LE straight)    1  1  1  1   10 B  10 B  10 B  10 B                         Neuromuscular Re-ed (21743)       Fwd step down 6\" B UE use   Lateral step ups 6\" B UE use   Side steps Blue TB  Done at // bars   Tiltboard taps - A/P, M/L     Tiltboard balance - A/P, M/L     Balance  -tandem  -NBOS, twisting R/L with ball taps  -ball toss  - Shoulder flex/ext with ball Airex    3kg ball    3kg ball    Step ups onto airex       Fwd Step ups 6\"      Biodex  - weight shift training  - motor control training  - Maze control training      - Ball maze  - Random Control   L 5  L 5  L 5      L 5 / High   L 6        1:03 min  2.5 min     Results= 85%  Results = 82%, 93%      [place] speed, skill level; 97%   Biodex recovery rapids L 6/Medium  4 min Points: 1641  Distance: 863 m  Bottles: 45                        Manual Intervention (75957)       STM with HawkGrips #8 to B HS tendon insertion       STM to L QL and glute med, ITB and glute med also done in sidelying                                       Modalities:   5/12: Gameready on mod setting to L knee in long sitting x 15 min at the end of the session  Pre-vaso: L suprapatellar: 42.5 cm  Post-vaso: L suprapatellar: 41 cm    5/20: Gameready on mod setting to L knee in long sitting x 15 min at the end of the session  Pre-vaso: L suprapatellar: 41 cm   Post-vaso: L suprapatellar: 42.5 cm    Pt. Education:  4/22: patient educated on diagnosis, prognosis and expectations for rehab. Discussion today included: What gym equipment to use and to be conscious of form including not letting knees bow or hyperextend. Suggested adding hip abduction machine to routine and continuing as long as not painful. Also discussed that PT will not solve patient's issue but can help improve flexibility, strength/stability.   -all patient questions were answered  4/28: patient educated on adding eccentric components to HS curls and knee extension machines. Home Exercise Program:    Access Code: [email or web address]  URL: [email or web address]  Date: 04/30/2022  Prepared by: Brigido 42 with Resistance - 1 x daily - 7 x weekly - 2 sets - 10 reps  Supine Bridge - 1 x daily - 7 x weekly - 2 sets - 10 reps  Standing Hip Abduction with Counter Support - 1 x daily - 7 x weekly - 2 sets - 10 reps  Prone Quadriceps Stretch with Strap - 1 x daily - 7 x weekly - 1 sets - 3 reps - 30 hold    5/6: Pt educated on shifting from hooklying clamshells to sidelying clamshells, no HO provided    5/20: Added seated piriformis stretch, quadruped hip extensions, and fire hydrants, no HO provided    5/24:  Added sidelying hip circles, no HO given    6/10: HO provided for all exercises above    Access Code: [email or web address]  URL: [email or web address]  Date: 06/10/2022  Prepared by: Jada Sabillon    Exercises     Clamshell with Resistance - 1 x daily - 7 x weekly - 2 sets - 10 reps   Sidelying Hip Circles - 1 x daily - 7 x weekly - 2 sets - 10 reps   Quadruped Alternating Leg Extensions - 1 x daily - 7 x weekly - 2 sets - 10 reps   Quadruped Fire Hydrant - 1 x daily - 7 x weekly - 2 sets - 10 reps   Seated Piriformis Stretch with Trunk Bend - 1 x daily - 7 x weekly - 3 reps - 30 seconds hold  6/13: Added q-ped hip ext pulses and HS curls     Therapeutic Exercise and NMR EXR  [x] (22068) Provided verbal/tactile cueing for activities related to strengthening, flexibility, endurance, ROM for improvements in  [x] LE / Lumbar: LE, proximal hip, and core control with self care, mobility, lifting, ambulation. [] UE / Cervical: cervical, postural, scapular, scapulothoracic and UE control with self care, reaching, carrying, lifting, house/yardwork, driving, computer work. [x] (27405) Provided verbal/tactile cueing for activities related to improving balance, coordination, kinesthetic sense, posture, motor skill, proprioception to assist with   [x] LE / lumbar: LE, proximal hip, and core control in self care, mobility, lifting, ambulation and eccentric single leg control. [] UE / cervical: cervical, scapular, scapulothoracic and UE control with self care, reaching, carrying, lifting, house/yardwork, driving, computer work.   [] (07413) Therapist is in constant attendance of 2 or more patients providing skilled therapy interventions, but not providing any significant amount of measurable one-on-one time to either patient, for improvements in  [] LE / lumbar: LE, proximal hip, and core control in self care, mobility, lifting, ambulation and eccentric single leg control.    [] UE / cervical: cervical, scapular, scapulothoracic and UE control with self care, reaching, carrying, lifting, house/yardwork, driving, computer work. NMR and Therapeutic Activities:    [x] (97374 or 73179) Provided verbal/tactile cueing for activities related to improving balance, coordination, kinesthetic sense, posture, motor skill, proprioception and motor activation to allow for proper function of   [x] LE: / Lumbar core, proximal hip and LE with self care and ADLs  [] UE / Cervical: cervical, postural, scapular, scapulothoracic and UE control with self care, carrying, lifting, driving, computer work.   [] (32095) Gait Re-education- Provided training and instruction to the patient for proper LE, core and proximal hip recruitment and positioning and eccentric body weight control with ambulation re-education including up and down stairs     Home Management Training / Self Care:  [] (03983) Provided self-care/home management training related to activities of daily living and compensatory training, and/or use of adaptive equipment for improvement with: ADLs and compensatory training, meal preparation, safety procedures and instruction in use of adaptive equipment, including bathing, grooming, dressing, personal hygiene, basic household cleaning and chores.      Home Exercise Program:    [] (22418) Reviewed/Progressed HEP activities related to strengthening, flexibility, endurance, ROM of   [] LE / Lumbar: core, proximal hip and LE for functional self-care, mobility, lifting and ambulation/stair navigation   [] UE / Cervical: cervical, postural, scapular, scapulothoracic and UE control with self care, reaching, carrying, lifting, house/yardwork, driving, computer work  [] (68411)Reviewed/Progressed HEP activities related to improving balance, coordination, kinesthetic sense, posture, motor skill, proprioception of   [] LE: core, proximal hip and LE for self care, mobility, lifting, and ambulation/stair navigation    [] UE / Cervical: cervical, postural,  scapular, scapulothoracic and UE control with self care, reaching, carrying, lifting, house/yardwork, driving, computer work    Manual Treatments:  PROM / STM / Oscillations-Mobs:  G-I, II, III, IV (PA's, Inf., Post.)  [] (87947) Provided manual therapy to mobilize LE, proximal hip and/or LS spine soft tissue/joints for the purpose of modulating pain, promoting relaxation,  increasing ROM, reducing/eliminating soft tissue swelling/inflammation/restriction, improving soft tissue extensibility and allowing for proper ROM for normal function with   [] LE / lumbar: self care, mobility, lifting and ambulation. [] UE / Cervical: self care, reaching, carrying, lifting, house/yardwork, driving, computer work. Modalities:  [] (99755) Vasopneumatic compression: Utilized vasopneumatic compression to decrease edema / swelling for the purpose of improving mobility and quad tone / recruitment which will allow for increased overall function including but not limited to self-care, transfers, ambulation, and ascending / descending stairs. 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 Progressing: []? Met: []? Not Met: []? Adjusted     Therapist goals for Patient:   Short Term Goals: To be achieved in: 2 weeks  1. Independent in HEP and progression per patient tolerance, in order to prevent re-injury.    []? Progressing: [x]? Met: []? Not Met: []? Adjusted  2. Patient will have a decrease in pain to facilitate improvement in movement, function, and ADLs as indicated by Functional Deficits. [x]? Progressing: []? Met: []? Not Met: []? Adjusted     Long Term Goals: To be achieved in: 5 weeks  1. FOTO score of at least 71 to assist with reaching prior level of function. [x]? Progressing: []? Met: []? Not Met: []? Adjusted  2. Patient will demonstrate increased L quad flexibility to 118 degrees or greater to reduce strain on tissues and lower pain levels for descending stairs. []? Progressing: [x]? Met: []? Not Met: []? Adjusted  3. Patient will demonstrate an increase in hip abd and ext strength to at least 4+/5 for improved tolerance of prolonged walking. [x]? Progressing: []? Met: []? Not Met: []? Adjusted  4. Patient will return to functional activities including tolerating ambulation for at least 20 minutes without difficulty to progress towards PLOF. []? Progressing: [x]? Met: []? Not Met: []? Adjusted  5. Patient to report a decrease in pain and stiffness by 50% when first waking up in the morning and transitioning out of bed. [x]? Progressing: []? Met: []? Not Met: []? Adjusted      Overall Progression Towards Functional goals/ Treatment Progress Update:  [x] Patient is progressing as expected towards functional goals listed. [] Progression is slowed due to complexities/Impairments listed. [] Progression has been slowed due to co-morbidities.   [] Plan just implemented, too soon to assess goals progression <30days   [] Goals require adjustment due to lack of progress  [] Patient is not progressing as expected and requires additional follow up with physician  [] Other    Persisting Functional Limitations/Impairments:  []Sleeping []Sitting               []Standing []Transfers        [x]Walking []Kneeling               []Stairs []Squatting / bending   []ADLs []Reaching  []Lifting []Housework  []Driving [x]Job related tasks  []Sports/Recreation []Other:        ASSESSMENT: Progressed glute strength this date with q-ped and leaning on elevated plinth hip kicks. Pt tolerated well and given minor cueing for form. Audible grinding heard in L knee during gliders. Pt to follow up with knee surgeon later this week and will determine need for additional PT at next session. Treatment/Activity Tolerance:  [x] Patient able to complete tx  [] Patient limited by fatigue  [] Patient limited by pain  [] Patient limited by other medical complications  [] Other:     Prognosis: [] Good [x] Fair  [] Poor    Patient Requires Follow-up: [x] Yes  [] No    Plan for next treatment session:Stretching, quad strengthening, hip strengthening    PLAN: See eval. PT 2x / week for 4-5 weeks. [x] Continue per plan of care [] Alter current plan (see comments)  [] Plan of care initiated [] Hold pending MD visit [] Discharge    Electronically signed by: Pat Pereyra PT  DPT         Note: If patient does not return for scheduled/ recommended follow up visits, this note will serve as a discharge from care along with most recent update on progress.

## 2022-06-17 ENCOUNTER — HOSPITAL ENCOUNTER (OUTPATIENT)
Dept: PHYSICAL THERAPY | Age: 67
Setting detail: THERAPIES SERIES
Discharge: HOME | End: 2022-06-17
Payer: MEDICARE

## 2022-06-17 PROCEDURE — 97110 THERAPEUTIC EXERCISES: CPT

## 2022-06-17 NOTE — PLAN OF CARE
[address] [facility]  Phone: [phone]   Fax: [phone]   Physical Therapy Discharge Summary    Dear Sonya Torres MD  ,    We had the pleasure of treating the following patient for physical therapy services at [facility]. A summary of our findings can be found in the discharge summary below. If you have any questions or concerns regarding these findings, please do not hesitate to contact me at the office phone number checked above. Thank you for the referral.     Physician Signature:________________________________Date:__________________  By signing above (or electronic signature), therapists plan is approved by physician      Functional Outcome:                    FOTO physical FS primary measure score = 59                                  AROM     L R   Knee Flexion 122  135    Knee Extension 0 deg  +7 hyperextend    Dorsiflexion  9 deg  8 deg    *L ankle resting position is 10 deg inversion    Strength (0-5) / Myotomes Left Right   Hip Flexion - supine 5 5   Hip Flexion - seated (L1-2)       Hip Abduction 4+ 5   Hip Extension 4 4-   Hip ER       Hip IR       Quads (L2-4) 4+ 5   Hamstrings               Flexibility       Hamstrings (90/90) Lack 12  Lack <10   ITB (Shan)       Quads (Ely's) 118 129   Hip Flexor Catana Scrape)       Piriformis  Less than mild limitation Mod limitation     Overall Response to Treatment:   []Patient is responding well to treatment and improvement is noted with regards  to goals   []Patient should continue to improve in reasonable time if they continue HEP   []Patient has plateaued and is no longer responding to skilled PT intervention    []Patient is getting worse and would benefit from return to referring MD   []Patient unable to adhere to initial POC   [x]Other: Pt initially referred to PT for L knee pain and L LE weakness.  Pt has been seen for a total of 16 visits and has made progress with strength and flexibility, as well as reporting a reduction in pain and stiffness overall. Pt saw knee surgeon this week and has decided to move forward with a TKA to be performed later this summer. Pt encouraged to continue with prescribed exercises until surgery and was given gentle post-op exercises to do also. Pt now discharged from PT.         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 Progressing: []? Met: []? Not Met: []? Adjusted     Therapist goals for Patient:   Short Term Goals: To be achieved in: 2 weeks  1. Independent in HEP and progression per patient tolerance, in order to prevent re-injury. []? Progressing: [x]? Met: []? Not Met: []? Adjusted  2. Patient will have a decrease in pain to facilitate improvement in movement, function, and ADLs as indicated by Functional Deficits. []? Progressing: [x]? Met: []? Not Met: []? Adjusted     Long Term Goals: To be achieved in: 5 weeks  1. FOTO score of at least 71 to assist with reaching prior level of function. []? Progressing: []? Met: [x]? Not Met: []? Adjusted  2. Patient will demonstrate increased L quad flexibility to 118 degrees or greater to reduce strain on tissues and lower pain levels for descending stairs. []? Progressing: [x]? Met: []? Not Met: []? Adjusted  3. Patient will demonstrate an increase in hip abd and ext strength to at least 4+/5 for improved tolerance of prolonged walking. []? Progressing: []? Met: [x]? Not Met: []? Adjusted  4. Patient will return to functional activities including tolerating ambulation for at least 20 minutes without difficulty to progress towards PLOF. []? Progressing: [x]? Met: []? Not Met: []? Adjusted  5. Patient to report a decrease in pain and stiffness by 50% when first waking up in the morning and transitioning out of bed.    Pt reports a 40-50% improvement and the knee is loosening up faster than it had been. []? Progressing: [x]? Met: []? Not Met: []? Adjusted       Date range of Visits: 22 - 22  Total Visits: 16    Recommendation:    [x] Discharge to [facility]. Follow up with PT or MD PRN. Physical Therapy Daily Treatment Note  Date:  2022    Patient Name:  Timur East    :  1955  MRN: [id number]  Medical/Treatment Diagnosis Information:  Diagnosis: M17.12 - Patellofemoral arthritis of left knee  Treatment Diagnosis: L knee pain, hip weakness, decreased quad flexibility, impaired gait  Insurance/Certification information:  PT Insurance Information: MEDICARE   Physician Information:    Verenice Rob MD  Plan of care signed (Y/N): [x]  Yes []  No     Date of Patient follow up with Physician:      Progress Report: [x]  Yes  []  No     Date Range for reporting period:  Beginnin22 - signed  POC: 22 - signed   Endin22    Progress report due (10 Rx/or 30 days whichever is less): visit #16 or      Recertification due (POC duration/ or 90 days whichever is less): visit #16 or 22     Visit # Insurance Allowable Auth required? Date Range   10/10 +  Medical necessity []  Yes  [x]  No n/a     Latex Allergy:  [x]NO      []YES  Preferred Language for Healthcare:   [x]English       []other:    Functional Scale:           Date assessed:  FOTO physical FS primary measure score = 63; risk adjusted = 61  22  FOTO physical FS primary measure score = 63     22  FOTO physical FS primary measure score = 59     22     Pain level:  2.5/10     SUBJECTIVE: Patient reports tightness in his low back likely due to hip ext and \"pulses\" from his HEP.   Pt will be getting a knee replacement, likely later in August.     OBJECTIVE:   : Pt 12 minutes late this date due to traffic  : Leg swelling, measured at patellar base: 39 cm R, 41 cm L  : Leg swelling, measured at patellar base: 38.8 cm, 42.5 cm L  5/20: Leg swelling, measured at patellar base: 39 cm R, 41 cm L    5/20:   *Done w/o knee brace    AROM     L R   Hip Flexion       Hip Abduction       Hip ER       Hip IR       Knee Flexion 122  135    Knee Extension 0 deg  +7 hyperextend    Dorsiflexion  9 deg  8 deg    Plantarflexion        Inversion        Eversion        *L ankle resting position is 10 deg inversion     Strength (0-5) / Myotomes Left Right   Hip Flexion - supine 4+ 4   Hip Flexion - seated (L1-2)       Hip Abduction 4 4+   Hip Extension 4- 4-   Hip ER       Hip IR       Quads (L2-4) 4+ 5   Hamstrings       Ankle Dorsiflexion (L4-5)       Ankle Plantarflexion (S1-2)       Ankle Inversion       Ankle Eversion (S1-2)       Great Toe Extension (L5)               Flexibility       Hamstrings (90/90) Lack 12  Lack <10   ITB (Shan)       Quads (Ely's) 118 129   Hip Flexor Francesca Bulla)       Piriformis  Less than mild limitation Mod limitation     Balance: unable to complete heel or toe walking without compensation     Stairs: able to climb 3 x 6 inch steps with 1 HR, reciprocal pattern but decreased eccentric quad control with descent as of 5/20 this is improving but pt still demonstrates decreased eccentric quad control     6/10 (measurements for ortho MD):    AROM     L R   Knee Flexion 132 135    Knee Extension +4 deg hyperextend +7 hyperextend    Dorsiflexion  10 deg  10 deg    *L ankle resting position is 10 deg inversion     Strength (0-5) / Myotomes Left Right   Hip Flexion - supine 5 4+   Hip Flexion - seated (L1-2)       Hip Abduction 5 4+   Hip Extension 4 4   Hip ER       Hip IR       Quads (L2-4) 5 5   Hamstrings               Flexibility       Hamstrings (90/90) Lack 14 Lack <10   ITB (Shan)       Quads (Ely's) 112 131   Hip Flexor Francesca Bulla)       Piriformis  WNL Mod limitation       RESTRICTIONS/PRECAUTIONS: None     Exercises/Interventions:     Therapeutic Exercises (66488) Resistance / level Sets/sec Reps Notes   Nustep 5      IB  2x30\"    HR/TR   10 ea    HSS  30\" B seated   Supine piriformis stretch      Prone quad stretch       4/30: HEP   Supine ITB and glute stretch with strap      LAQ in seated 2#  Cues for slow speed   Seated hip ER, IR Blue TB     Hip abd sliders  5/16: added slight knee bend on R side   Hip ext sliders  5/16: added slight knee bend on R side   SL squat with hip cirlces - opp LE on glider     Mini squats Airex 5/27 progressed to no UE support   Hip abd SLR 2# ankle weight   Stance leg on 2\" step   4/30: HEP   Supine clamshells Blue TB  HEP   Bridges   HEP          Leg extension - double legs 30#    Leg extension - eccentric, up 2, down 1 30#    HS curls 50#    Leg press - double 110#    Leg press - single leg 55#    Sidelying Clamshells Lime TB   HEP   Sidelying hip abduction     DC completed this date, see above 6/17      Sidelying hip circles    5/24:  Added to HEP   Supine hip adduction with ball  5\" holds  Cues for TA activation   3 way hip  BlueTB   Fwd, abd, ext, stance leg on 2\" step   RDLs Touching 6\" step on its side    Leaning on elevated plinth:  - hip ext kicks  - hip abd kicks  - HS curls  - Hip ext pulses (keep LE straight) Plinth at highest level                                Therapeutic Activities (71648)       STS with KB 10#    [place] carry 20# KB    Ambulation  5/27: Billed as TE   Eccentric sit downs - plinth at 21.75\" 7.5# KB 5/24: Billed as TE          Qped:  - hip extension  - fire hydrant  - HS curl  - Hip ext pulses (keep LE straight)                          Neuromuscular Re-ed (70034)       Fwd step down 6\" B UE use   Lateral step ups 6\" B UE use   Side steps Blue TB  Done at // bars   Tiltboard taps - A/P, M/L     Tiltboard balance - A/P, M/L     Balance  -tandem  -NBOS, twisting R/L with ball taps  -ball toss  - Shoulder flex/ext with ball Airex    3kg ball    3kg ball    Step ups onto airex       Fwd Step ups 6\"      Biodex  - weight shift training  - motor control training  - Maze control training      - Ball maze  - Random Control   L 5  L 5  L 5      L 5 / High   L 6     Results= 85%  Results = 82%, 93%      [place] speed, skill level; 97%   Biodex recovery rapids L 6/Medium Points: 1641  Distance: 863 m  Bottles: 45                        Manual Intervention (33266)       STM with HawkGrips #8 to B HS tendon insertion       STM to L QL and glute med, ITB and glute med also done in sidelying                                       Modalities:   5/12: Gameready on mod setting to L knee in long sitting x 15 min at the end of the session  Pre-vaso: L suprapatellar: 42.5 cm  Post-vaso: L suprapatellar: 41 cm    5/20: Gameready on mod setting to L knee in long sitting x 15 min at the end of the session  Pre-vaso: L suprapatellar: 41 cm   Post-vaso: L suprapatellar: 42.5 cm    Pt. Education:  4/22: patient educated on diagnosis, prognosis and expectations for rehab. Discussion today included: What gym equipment to use and to be conscious of form including not letting knees bow or hyperextend. Suggested adding hip abduction machine to routine and continuing as long as not painful. Also discussed that PT will not solve patient's issue but can help improve flexibility, strength/stability.   -all patient questions were answered  4/28: patient educated on adding eccentric components to HS curls and knee extension machines. Home Exercise Program:    Access Code: [email or web address]  URL: [email or web address]  Date: 04/30/2022  Prepared by: Brigido 42 with Resistance - 1 x daily - 7 x weekly - 2 sets - 10 reps  Supine Bridge - 1 x daily - 7 x weekly - 2 sets - 10 reps  Standing Hip Abduction with Counter Support - 1 x daily - 7 x weekly - 2 sets - 10 reps  Prone Quadriceps Stretch with Strap - 1 x daily - 7 x weekly - 1 sets - 3 reps - 30 hold    5/6: Pt educated on shifting from hooklying clamshells to sidelying clamshells, no HO provided    5/20: Added seated piriformis stretch, quadruped hip extensions, and fire hydrants, no HO provided    5/24: Added sidelying hip circles, no HO given    6/10: HO provided for all exercises above    Access Code: [email or web address]  URL: [email or web address]  Date: 06/10/2022  Prepared by: Bhavya Garsia    Exercises     Clamshell with Resistance - 1 x daily - 7 x weekly - 2 sets - 10 reps   Sidelying Hip Circles - 1 x daily - 7 x weekly - 2 sets - 10 reps   Quadruped Alternating Leg Extensions - 1 x daily - 7 x weekly - 2 sets - 10 reps   Quadruped Fire Hydrant - 1 x daily - 7 x weekly - 2 sets - 10 reps   Seated Piriformis Stretch with Trunk Bend - 1 x daily - 7 x weekly - 3 reps - 30 seconds hold  6/13: Added q-ped hip ext pulses and HS curls     Access Code: [email or web address]  URL: [email or web address]  Date: 06/17/2022  Prepared by: Bhavya Garsia    Exercises  Supine Heel Slide with Strap - 2-3 x daily - 7 x weekly - 1 sets - 10 reps  Supine Quad Set - 2-3 x daily - 7 x weekly - 1 sets - 10 reps - 3-5 seconds hold  Supine Knee Extension Stretch on Towel Roll - 2-3 x daily - 7 x weekly - 1 sets - 4 reps - 30-60 seconds hold  Supine Gluteal Sets - 2-3 x daily - 7 x weekly - 2 sets - 10 reps  Supine Ankle Pumps - 2-3 x daily - 7 x weekly - 1 sets - 10 reps  Seated Ankle Pumps on Table - 2-3 x daily - 7 x weekly - 1 sets - 10 reps      Therapeutic Exercise and NMR EXR  [x] (98130) Provided verbal/tactile cueing for activities related to strengthening, flexibility, endurance, ROM for improvements in  [x] LE / Lumbar: LE, proximal hip, and core control with self care, mobility, lifting, ambulation. [] UE / Cervical: cervical, postural, scapular, scapulothoracic and UE control with self care, reaching, carrying, lifting, house/yardwork, driving, computer work.   [x] (02554) Provided verbal/tactile cueing for activities related to improving balance, coordination, kinesthetic sense, posture, motor skill, proprioception to assist with   [x] LE / lumbar: LE, proximal hip, and core control in self care, mobility, lifting, ambulation and eccentric single leg control. [] UE / cervical: cervical, scapular, scapulothoracic and UE control with self care, reaching, carrying, lifting, house/yardwork, driving, computer work.   [] (04150) Therapist is in constant attendance of 2 or more patients providing skilled therapy interventions, but not providing any significant amount of measurable one-on-one time to either patient, for improvements in  [] LE / lumbar: LE, proximal hip, and core control in self care, mobility, lifting, ambulation and eccentric single leg control. [] UE / cervical: cervical, scapular, scapulothoracic and UE control with self care, reaching, carrying, lifting, house/yardwork, driving, computer work.      NMR and Therapeutic Activities:    [x] (66954 or 72004) Provided verbal/tactile cueing for activities related to improving balance, coordination, kinesthetic sense, posture, motor skill, proprioception and motor activation to allow for proper function of   [x] LE: / Lumbar core, proximal hip and LE with self care and ADLs  [] UE / Cervical: cervical, postural, scapular, scapulothoracic and UE control with self care, carrying, lifting, driving, computer work.   [] (98659) Gait Re-education- Provided training and instruction to the patient for proper LE, core and proximal hip recruitment and positioning and eccentric body weight control with ambulation re-education including up and down stairs     Home Management Training / Self Care:  [] (42034) Provided self-care/home management training related to activities of daily living and compensatory training, and/or use of adaptive equipment for improvement with: ADLs and compensatory training, meal preparation, safety procedures and instruction in use of adaptive equipment, including bathing, grooming, dressing, personal hygiene, basic household cleaning and chores. Home Exercise Program:    [] (71811) Reviewed/Progressed HEP activities related to strengthening, flexibility, endurance, ROM of   [] LE / Lumbar: core, proximal hip and LE for functional self-care, mobility, lifting and ambulation/stair navigation   [] UE / Cervical: cervical, postural, scapular, scapulothoracic and UE control with self care, reaching, carrying, lifting, house/yardwork, driving, computer work  [] (69220)Reviewed/Progressed HEP activities related to improving balance, coordination, kinesthetic sense, posture, motor skill, proprioception of   [] LE: core, proximal hip and LE for self care, mobility, lifting, and ambulation/stair navigation    [] UE / Cervical: cervical, postural,  scapular, scapulothoracic and UE control with self care, reaching, carrying, lifting, house/yardwork, driving, computer work    Manual Treatments:  PROM / STM / Oscillations-Mobs:  G-I, II, III, IV (PA's, Inf., Post.)  [] (95972) Provided manual therapy to mobilize LE, proximal hip and/or LS spine soft tissue/joints for the purpose of modulating pain, promoting relaxation,  increasing ROM, reducing/eliminating soft tissue swelling/inflammation/restriction, improving soft tissue extensibility and allowing for proper ROM for normal function with   [] LE / lumbar: self care, mobility, lifting and ambulation. [] UE / Cervical: self care, reaching, carrying, lifting, house/yardwork, driving, computer work. Modalities:  [] (04272) Vasopneumatic compression: Utilized vasopneumatic compression to decrease edema / swelling for the purpose of improving mobility and quad tone / recruitment which will allow for increased overall function including but not limited to self-care, transfers, ambulation, and ascending / descending stairs.      Charges:  Timed Code Treatment Minutes: 27   Total Treatment Minutes: 27     [] EVAL - LOW (60714)   [] EVAL - MOD (80921)  [] EVAL - HIGH (85683)  [] Irina Horner (03014)  [x] EU(30810) x  2    [] Ionto  [] NMR (71197) x        [] Vaso  [] Manual (05901) x       [] Ultrasound  [] TA x        [] Mech Traction (61497)  [] Aquatic Therapy x      [] ES (un) (40309):   [] Home Management Training x  [] ES(attended) (54840)   [] Dry Needling 1-2 muscles (21180):  [] Dry Needling 3+ muscles (065984)  [] Group:      [] Other:         Overall Progression Towards Functional goals/ Treatment Progress Update:  [x] Patient is progressing as expected towards functional goals listed. [] Progression is slowed due to complexities/Impairments listed. [] Progression has been slowed due to co-morbidities. [] Plan just implemented, too soon to assess goals progression <30days   [] Goals require adjustment due to lack of progress  [] Patient is not progressing as expected and requires additional follow up with physician  [] Other    Persisting Functional Limitations/Impairments:  []Sleeping []Sitting               []Standing []Transfers        [x]Walking []Kneeling               []Stairs []Squatting / bending   []ADLs []Reaching  []Lifting  []Housework  []Driving [x]Job related tasks  []Sports/Recreation []Other:        ASSESSMENT: See above  Treatment/Activity Tolerance:  [x] Patient able to complete tx  [] Patient limited by fatigue  [] Patient limited by pain  [] Patient limited by other medical complications  [] Other:     Prognosis: [] Good [x] Fair  [] Poor    Patient Requires Follow-up: [] Yes  [x] No    Plan for next treatment session:Stretching, quad strengthening, hip strengthening    PLAN: See ashlee. PT 2x / week for 4-5 weeks. [] Continue per plan of care [] Alter current plan (see comments)  [] Plan of care initiated [] Hold pending MD visit [x] Discharge    Electronically signed by: Myrna Blanca, PT  DPT         Note: If patient does not return for scheduled/ recommended follow up visits, this note will serve as a discharge from care along with most recent update on progress.

## 2022-06-21 ENCOUNTER — TELEPHONE (OUTPATIENT)
Dept: FAMILY MEDICINE CLINIC | Age: 67
End: 2022-06-21

## 2022-06-21 DIAGNOSIS — Z01.818 PREOP EXAMINATION: Primary | ICD-10-CM

## 2022-06-21 NOTE — TELEPHONE ENCOUNTER
Pt stopped in to schedule a pre op appt for L total knee arthroscopy on 8/15 with Dr. Yan Kay at [facility]. 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      Please advise   Last OV: 10/7/2021

## 2022-07-18 DIAGNOSIS — Z01.818 PREOP EXAMINATION: ICD-10-CM

## 2022-07-18 LAB
ANION GAP SERPL CALCULATED.3IONS-SCNC: 13 MMOL/L (ref 3–16)
APTT: 31.5 SEC (ref 23–34.3)
BASOPHILS ABSOLUTE: 0 K/UL (ref 0–0.2)
BASOPHILS RELATIVE PERCENT: 0.7 %
BUN BLDV-MCNC: 14 MG/DL (ref 7–20)
CALCIUM SERPL-MCNC: 9.1 MG/DL (ref 8.3–10.6)
CHLORIDE BLD-SCNC: 103 MMOL/L (ref 99–110)
CO2: 25 MMOL/L (ref 21–32)
CREAT SERPL-MCNC: 1.1 MG/DL (ref 0.8–1.3)
EOSINOPHILS ABSOLUTE: 0.2 K/UL (ref 0–0.6)
EOSINOPHILS RELATIVE PERCENT: 3.1 %
GFR AFRICAN AMERICAN: >60
GFR NON-AFRICAN AMERICAN: >60
GLUCOSE BLD-MCNC: 152 MG/DL (ref 70–99)
HCT VFR BLD CALC: 40.5 % (ref 40.5–52.5)
HEMOGLOBIN: 13.8 G/DL (ref 13.5–17.5)
INR BLD: 0.96 (ref 0.87–1.14)
LYMPHOCYTES ABSOLUTE: 2 K/UL (ref 1–5.1)
LYMPHOCYTES RELATIVE PERCENT: 29.5 %
MCH RBC QN AUTO: 29.4 PG (ref 26–34)
MCHC RBC AUTO-ENTMCNC: 34.1 G/DL (ref 31–36)
MCV RBC AUTO: 86.3 FL (ref 80–100)
MONOCYTES ABSOLUTE: 0.4 K/UL (ref 0–1.3)
MONOCYTES RELATIVE PERCENT: 5.6 %
NEUTROPHILS ABSOLUTE: 4.1 K/UL (ref 1.7–7.7)
NEUTROPHILS RELATIVE PERCENT: 61.1 %
PDW BLD-RTO: 13 % (ref 12.4–15.4)
PLATELET # BLD: 421 K/UL (ref 135–450)
PMV BLD AUTO: 7 FL (ref 5–10.5)
POTASSIUM SERPL-SCNC: 4.4 MMOL/L (ref 3.5–5.1)
PROTHROMBIN TIME: 12.7 SEC (ref 11.7–14.5)
RBC # BLD: 4.69 M/UL (ref 4.2–5.9)
SODIUM BLD-SCNC: 141 MMOL/L (ref 136–145)
WBC # BLD: 6.7 K/UL (ref 4–11)

## 2022-07-18 PROCEDURE — 36415 COLL VENOUS BLD VENIPUNCTURE: CPT | Performed by: FAMILY MEDICINE

## 2022-07-20 ENCOUNTER — OFFICE VISIT (OUTPATIENT)
Dept: FAMILY MEDICINE CLINIC | Age: 67
End: 2022-07-20
Payer: MEDICARE

## 2022-07-20 VITALS
BODY MASS INDEX: 27.96 KG/M2 | SYSTOLIC BLOOD PRESSURE: 136 MMHG | OXYGEN SATURATION: 98 % | HEIGHT: 68 IN | DIASTOLIC BLOOD PRESSURE: 84 MMHG | RESPIRATION RATE: 16 BRPM | HEART RATE: 78 BPM | TEMPERATURE: 97.2 F | WEIGHT: 184.5 LBS

## 2022-07-20 DIAGNOSIS — Z01.818 PREOP EXAMINATION: Primary | ICD-10-CM

## 2022-07-20 DIAGNOSIS — M17.12 PRIMARY OSTEOARTHRITIS OF LEFT KNEE: ICD-10-CM

## 2022-07-20 DIAGNOSIS — G95.9 DISEASE OF SPINAL CORD, UNSPECIFIED (HCC): ICD-10-CM

## 2022-07-20 DIAGNOSIS — R73.9 HYPERGLYCEMIA: ICD-10-CM

## 2022-07-20 PROCEDURE — 99214 OFFICE O/P EST MOD 30 MIN: CPT | Performed by: FAMILY MEDICINE

## 2022-07-20 PROCEDURE — 1123F ACP DISCUSS/DSCN MKR DOCD: CPT | Performed by: FAMILY MEDICINE

## 2022-07-20 PROCEDURE — 36415 COLL VENOUS BLD VENIPUNCTURE: CPT | Performed by: FAMILY MEDICINE

## 2022-07-20 ASSESSMENT — PATIENT HEALTH QUESTIONNAIRE - PHQ9
2. FEELING DOWN, DEPRESSED OR HOPELESS: 0
SUM OF ALL RESPONSES TO PHQ QUESTIONS 1-9: 0
1. LITTLE INTEREST OR PLEASURE IN DOING THINGS: 0
SUM OF ALL RESPONSES TO PHQ9 QUESTIONS 1 & 2: 0

## 2022-07-20 NOTE — PROGRESS NOTES
Preoperative Consultation      Lakia Sanchez  YOB: 1955    Date of Service:  7/20/2022    Vitals:    07/20/22 0928   BP: 136/84   Pulse: 78   Resp: 16   Temp: 97.2 °F (36.2 °C)   TempSrc: Tympanic   SpO2: 98%   Weight: 184 lb 8 oz (83.7 kg)   Height: 5' 7.5\" (1.715 m)      Wt Readings from Last 2 Encounters:   07/20/22 184 lb 8 oz (83.7 kg)   06/02/22 185 lb (83.9 kg)     BP Readings from Last 3 Encounters:   07/20/22 136/84   10/18/21 124/88   10/15/21 123/76        Chief Complaint   Patient presents with    Pre-op Exam     Total left knee arthroscopy on 8/15/22 with Dr. Kerline Arora at [address]   Fax: [phone]     No Known Allergies  Outpatient Medications Marked as Taking for the 7/20/22 encounter (Office Visit) with Jovanny Ribeiro MD   Medication Sig Dispense Refill    Ascorbic Acid (VITAMIN C) 250 MG tablet Take 250 mg by mouth daily      vitamin D3 (CHOLECALCIFEROL) 10 MCG (400 UNIT) TABS tablet Take 400 Units by mouth daily      vitamin B-12 500 MCG tablet Take 1 tablet by mouth daily 30 tablet 3       This patient presents to the office today for a preoperative consultation at the request of surgeon, Dr. Kerline Arora, who plans on performing TKR LEFT  on August 15 at [facility].  .       Planned anesthesia: General   Known anesthesia problems: None   Bleeding risk: No recent or remote history of abnormal bleeding  Personal or FH of DVT/PE: No    Patient objection to receiving blood products: No        Past Medical History:   Diagnosis Date    BPH (benign prostatic hyperplasia)      Past Surgical History:   Procedure Laterality Date    CERVICAL FUSION  2006    CERVICAL FUSION N/A 10/15/2021    ANTERIOR CERVICAL DISCECTOMY AND FUSION C3-4 AND C6-7, DECOMPRESSION AND FUSION C3-4 POSTERIOR/ REMOVAL OF PLATES AND LATERAL MASS SCREW performed by Selena Orosco MD at [address], LAPAROSCOPIC  2009    LEG SURGERY  2000    benign tumor; partial gastronemeus resection Review of Systems  A comprehensive review of systems was negative except for what was noted in the HPI. Physical Exam   Constitutional: He is oriented to person, place, and time. He appears well-developed and well-nourished. No distress. HENT:   Head: Normocephalic and atraumatic. Mouth/Throat: Uvula is midline, oropharynx is clear and moist and mucous membranes are normal.   Eyes: Conjunctivae and EOM are normal. Pupils are equal, round, and reactive to light. Neck: Trachea normal and normal range of motion. Neck supple. No JVD present. Carotid bruit is not present. No mass and no thyromegaly present. Cardiovascular: Normal rate, regular rhythm, normal heart sounds and intact distal pulses. Exam reveals no gallop and no friction rub. No murmur heard. Pulmonary/Chest: Effort normal and breath sounds normal. No respiratory distress. He has no wheezes. He has no rales. Musculoskeletal: He exhibits no edema and no tenderness. Neurological: He is alert and oriented to person, place, and time. He has normal strength. No cranial nerve deficit or sensory deficit. Coordination and gait normal.   Skin: Skin is warm and dry. No rash noted. No erythema. Psychiatric: He has a normal mood and affect. His behavior is normal.     EKG Interpretation:  normal EKG, normal sinus rhythm, unchanged from previous tracings.     Lab Review not applicable    Latest Reference Range & Units 7/18/22 10:25   Sodium 136 - 145 mmol/L 141   Potassium 3.5 - 5.1 mmol/L 4.4   Chloride 99 - 110 mmol/L 103   CO2 21 - 32 mmol/L 25   BUN,BUNPL 7 - 20 mg/dL 14   Creatinine 0.8 - 1.3 mg/dL 1.1   Anion Gap 3 - 16  13   GFR Non-African American >60  >60   GFR African American >60  >60   GLUCOSE, FASTING,GF 70 - 99 mg/dL 152 (H) NOT FASTING    CALCIUM, SERUM, 476801 8.3 - 10.6 mg/dL 9.1   WBC 4.0 - 11.0 K/uL 6.7   RBC 4.20 - 5.90 M/uL 4.69   Hemoglobin Quant 13.5 - 17.5 g/dL 13.8   Hematocrit 40.5 - 52.5 % 40.5   MCV 80.0 - 100.0 fL 86.3   MCH 26.0 - 34.0 pg 29.4   MCHC 31.0 - 36.0 g/dL 34.1   MPV 5.0 - 10.5 fL 7.0   RDW 12.4 - 15.4 % 13.0   Platelet Count 868 - 450 K/uL 421   Neutrophils % % 61.1   Lymphocyte % % 29.5   Monocytes % % 5.6   Eosinophils % % 3.1   Basophils % % 0.7   Neutrophils Absolute 1.7 - 7.7 K/uL 4.1   Lymphocytes Absolute 1.0 - 5.1 K/uL 2.0   Monocytes Absolute 0.0 - 1.3 K/uL 0.4   Eosinophils Absolute 0.0 - 0.6 K/uL 0.2   Basophils Absolute 0.0 - 0.2 K/uL 0.0   Prothrombin Time 11.7 - 14.5 sec 12.7   INR 0.87 - 1.14  0.96   aPTT 23.0 - 34.3 sec 31.5          Assessment:       77 y.o. patient with planned surgery as above. Known risk factors for perioperative complications: hx of cervical spinal fusion   Current medications which may produce withdrawal symptoms if withheld perioperatively: none      Plan:     1. Preoperative workup as follows: a1c, ua w reflex to culture , mrsa screening   2. Change in medication regimen before surgery: None  3. Prophylaxis for cardiac events with perioperative beta-blockers: Not indicated  ACC/AHA indications for pre-operative beta-blocker use:    Vascular surgery with history of postitive stress test  Intermediate or high risk surgery with history of CAD   Intermediate or high risk surgery with multiple clinical predictors of CAD- 2 of the following: history of compensated or prior heart failure, history of cerebrovascular disease, DM, or renal insufficiency    Routine administration of higher-dose, long-acting metoprolol in beta-blocker-naïve patients on the day of surgery, and in the absence of dose titration is associated with an overall increase in mortality. Beta-blockers should be started days to weeks prior to surgery and titrated to pulse < 70.  4. Deep vein thrombosis prophylaxis: regimen to be chosen by surgical team  5.  No contraindications to planned surgery  Re check fasting glucose

## 2022-07-21 DIAGNOSIS — Z01.818 PREOP EXAMINATION: Primary | ICD-10-CM

## 2022-07-21 PROCEDURE — 81003 URINALYSIS AUTO W/O SCOPE: CPT | Performed by: FAMILY MEDICINE

## 2022-07-22 LAB
BILIRUBIN URINE: NEGATIVE
BLOOD, URINE: NEGATIVE
CLARITY: CLEAR
COLOR: YELLOW
GLUCOSE FASTING: 98 MG/DL (ref 70–99)
GLUCOSE URINE: NEGATIVE MG/DL
KETONES, URINE: NEGATIVE MG/DL
LEUKOCYTE ESTERASE, URINE: NEGATIVE
MICROSCOPIC EXAMINATION: NORMAL
MRSA SCREEN RT-PCR: NORMAL
NITRITE, URINE: NEGATIVE
PH UA: 5.5 (ref 5–8)
PROTEIN UA: NEGATIVE MG/DL
SPECIFIC GRAVITY UA: 1.02 (ref 1–1.03)
URINE REFLEX TO CULTURE: NORMAL
URINE TYPE: NORMAL
UROBILINOGEN, URINE: 0.2 E.U./DL

## 2022-11-29 SDOH — HEALTH STABILITY: PHYSICAL HEALTH: ON AVERAGE, HOW MANY DAYS PER WEEK DO YOU ENGAGE IN MODERATE TO STRENUOUS EXERCISE (LIKE A BRISK WALK)?: 4 DAYS

## 2022-11-29 SDOH — HEALTH STABILITY: PHYSICAL HEALTH: ON AVERAGE, HOW MANY MINUTES DO YOU ENGAGE IN EXERCISE AT THIS LEVEL?: 40 MIN

## 2022-11-29 ASSESSMENT — PATIENT HEALTH QUESTIONNAIRE - PHQ9
SUM OF ALL RESPONSES TO PHQ QUESTIONS 1-9: 0
SUM OF ALL RESPONSES TO PHQ9 QUESTIONS 1 & 2: 0
2. FEELING DOWN, DEPRESSED OR HOPELESS: 0
SUM OF ALL RESPONSES TO PHQ QUESTIONS 1-9: 0
1. LITTLE INTEREST OR PLEASURE IN DOING THINGS: 0
SUM OF ALL RESPONSES TO PHQ QUESTIONS 1-9: 0
SUM OF ALL RESPONSES TO PHQ QUESTIONS 1-9: 0

## 2022-11-29 ASSESSMENT — LIFESTYLE VARIABLES
HOW OFTEN DO YOU HAVE A DRINK CONTAINING ALCOHOL: NEVER
HOW OFTEN DO YOU HAVE SIX OR MORE DRINKS ON ONE OCCASION: 1
HOW OFTEN DO YOU HAVE A DRINK CONTAINING ALCOHOL: 1
HOW MANY STANDARD DRINKS CONTAINING ALCOHOL DO YOU HAVE ON A TYPICAL DAY: PATIENT DOES NOT DRINK
HOW MANY STANDARD DRINKS CONTAINING ALCOHOL DO YOU HAVE ON A TYPICAL DAY: 0

## 2022-12-08 ENCOUNTER — OFFICE VISIT (OUTPATIENT)
Dept: FAMILY MEDICINE CLINIC | Age: 67
End: 2022-12-08
Payer: MEDICARE

## 2022-12-08 VITALS
HEART RATE: 73 BPM | SYSTOLIC BLOOD PRESSURE: 138 MMHG | RESPIRATION RATE: 16 BRPM | WEIGHT: 188.6 LBS | HEIGHT: 67 IN | DIASTOLIC BLOOD PRESSURE: 86 MMHG | OXYGEN SATURATION: 98 % | BODY MASS INDEX: 29.6 KG/M2 | TEMPERATURE: 97.2 F

## 2022-12-08 DIAGNOSIS — Z12.11 SCREEN FOR COLON CANCER: ICD-10-CM

## 2022-12-08 DIAGNOSIS — Z00.00 INITIAL MEDICARE ANNUAL WELLNESS VISIT: Primary | ICD-10-CM

## 2022-12-08 PROCEDURE — G0438 PPPS, INITIAL VISIT: HCPCS | Performed by: FAMILY MEDICINE

## 2022-12-08 PROCEDURE — 36415 COLL VENOUS BLD VENIPUNCTURE: CPT | Performed by: FAMILY MEDICINE

## 2022-12-08 PROCEDURE — G8484 FLU IMMUNIZE NO ADMIN: HCPCS | Performed by: FAMILY MEDICINE

## 2022-12-08 PROCEDURE — 1123F ACP DISCUSS/DSCN MKR DOCD: CPT | Performed by: FAMILY MEDICINE

## 2022-12-08 PROCEDURE — 3017F COLORECTAL CA SCREEN DOC REV: CPT | Performed by: FAMILY MEDICINE

## 2022-12-08 SDOH — ECONOMIC STABILITY: FOOD INSECURITY: WITHIN THE PAST 12 MONTHS, THE FOOD YOU BOUGHT JUST DIDN'T LAST AND YOU DIDN'T HAVE MONEY TO GET MORE.: NEVER TRUE

## 2022-12-08 SDOH — ECONOMIC STABILITY: FOOD INSECURITY: WITHIN THE PAST 12 MONTHS, YOU WORRIED THAT YOUR FOOD WOULD RUN OUT BEFORE YOU GOT MONEY TO BUY MORE.: NEVER TRUE

## 2022-12-08 ASSESSMENT — SOCIAL DETERMINANTS OF HEALTH (SDOH): HOW HARD IS IT FOR YOU TO PAY FOR THE VERY BASICS LIKE FOOD, HOUSING, MEDICAL CARE, AND HEATING?: NOT HARD AT ALL

## 2022-12-08 NOTE — PROGRESS NOTES
Medicare Annual Wellness Visit    Andrea Carrero is here for Medicare AWV    Assessment & Plan    Diagnosis Orders   1. Initial Medicare annual wellness visit          Well adult:    . Doing well, encourage healthy diet, exercise, safety    . Screening labs orders given   . Preventive: check lipids, hep C screening    . Colonoscopy: referred,              Fu prn        Subjective       Patient's complete Health Risk Assessment and screening values have been reviewed and are found in Flowsheets. The following problems were reviewed today and where indicated follow up appointments were made and/or referrals ordered. Positive Risk Factor Screenings with Interventions:    Fall Risk:  Do you feel unsteady or are you worried about falling? : no  2 or more falls in past year?: (!) yes, sec to knee OA (now improved after TKR)    Fall with injury in past year?: no     Interventions:    See AVS for additional education material  See A/P for any pertinent orders                  Safety:  Do you have any tripping hazards - loose or unsecured carpets or rugs?: (!) Yes  Do you have any tripping hazards - clutter in doorways, halls, or stairs?: (!) Yes    Interventions:  Falls precautions discussed      Advanced Directives:  Do you have a Living Will?: (!) No, discussed and recommended                             Objective   Vitals:    12/08/22 0732   BP: (!) 148/86   Pulse: 73   Resp: 16   Temp: 97.2 °F (36.2 °C)   TempSrc: Tympanic   SpO2: 98%   Weight: 188 lb 9.6 oz (85.5 kg)   Height: 5' 7\" (1.702 m)      Body mass index is 29.54 kg/m². Physical Exam  Vitals and nursing note reviewed. Constitutional:       General: He is not in acute distress. Appearance: Normal appearance. He is well-developed. He is not ill-appearing, toxic-appearing or diaphoretic. Comments: Overweight      HENT:      Head: Normocephalic.       Right Ear: Tympanic membrane, ear canal and external ear normal. There is no impacted cerumen. Left Ear: Tympanic membrane, ear canal and external ear normal. There is no impacted cerumen. Eyes:      General: No scleral icterus. Right eye: No discharge. Left eye: No discharge. Extraocular Movements: Extraocular movements intact. Conjunctiva/sclera: Conjunctivae normal.      Pupils: Pupils are equal, round, and reactive to light. Neck:      Thyroid: No thyromegaly. Vascular: No carotid bruit or JVD. Cardiovascular:      Rate and Rhythm: Normal rate and regular rhythm. Pulses: Normal pulses. Heart sounds: Normal heart sounds. No murmur heard. No friction rub. No gallop. Pulmonary:      Effort: Pulmonary effort is normal. No respiratory distress. Breath sounds: Normal breath sounds. No stridor. No wheezing, rhonchi or rales. Chest:      Chest wall: No tenderness. Abdominal:      General: Abdomen is flat. Bowel sounds are normal. There is no distension. Palpations: Abdomen is soft. There is no mass. Tenderness: There is no abdominal tenderness. There is no guarding. Hernia: No hernia is present. Musculoskeletal:         General: No swelling or tenderness. Normal range of motion. Cervical back: Normal range of motion and neck supple. No muscular tenderness. Right lower leg: No edema. Left lower leg: No edema. Lymphadenopathy:      Cervical: No cervical adenopathy. Skin:     General: Skin is warm. Capillary Refill: Capillary refill takes less than 2 seconds. Coloration: Skin is not pale. Findings: No erythema or rash. Neurological:      General: No focal deficit present. Mental Status: He is alert and oriented to person, place, and time. Mental status is at baseline. Cranial Nerves: No cranial nerve deficit. Sensory: No sensory deficit. Motor: No weakness or abnormal muscle tone.       Coordination: Coordination normal.      Gait: Gait normal.      Deep Tendon Reflexes: Reflexes normal.   Psychiatric:         Mood and Affect: Mood normal.         Behavior: Behavior normal.         Thought Content: Thought content normal.         Judgment: Judgment normal.            No Known Allergies  Prior to Visit Medications    Medication Sig Taking?  Authorizing Provider   Ascorbic Acid (VITAMIN C) 250 MG tablet Take 250 mg by mouth daily  Patient not taking: Reported on 12/8/2022  Historical Provider, MD   vitamin D3 (CHOLECALCIFEROL) 10 MCG (400 UNIT) TABS tablet Take 400 Units by mouth daily  Patient not taking: Reported on 12/8/2022  Historical Provider, MD   vitamin B-12 500 MCG tablet Take 1 tablet by mouth daily  Patient not taking: Reported on 12/8/2022  Tatiana Lopez MD       [facility] (Including outside providers/suppliers regularly involved in providing care):   Patient Care Team:  Candie Madden MD as PCP - General (Family Medicine)  Candie Madden MD as PCP - REHABILITATION [facility] Empaneled Provider     Reviewed and updated this visit:  Tobacco  Allergies  Meds  Med Hx  Surg Hx  Soc Hx  Fam Hx

## 2022-12-08 NOTE — PATIENT INSTRUCTIONS
Preventing Falls: Care Instructions  Your Care Instructions     Getting around your home safely can be a challenge if you have injuries or health problems that make it easy for you to fall. Loose rugs and furniture in walkways are among the dangers for many older people who have problems walking or who have poor eyesight. People who have conditions such as arthritis, osteoporosis, or dementia also have to be careful not to fall. You can make your home safer with a few simple measures. Follow-up care is a key part of your treatment and safety. Be sure to make and go to all appointments, and call your doctor if you are having problems. It's also a good idea to know your test results and keep a list of the medicines you take. How can you care for yourself at home? Taking care of yourself  Exercise regularly to improve your strength, muscle tone, and balance. Walk if you can. Swimming may be a good choice if you cannot walk easily. Have your vision and hearing checked each year or any time you notice a change. If you have trouble seeing and hearing, you might not be able to avoid objects and could lose your balance. Know the side effects of the medicines you take. Ask your doctor or pharmacist whether the medicines you take can affect your balance. Sleeping pills or sedatives can affect your balance. Limit the amount of alcohol you drink. Alcohol can impair your balance and other senses. Ask your doctor whether calluses or corns on your feet need to be removed. If you wear loose-fitting shoes because of calluses or corns, you can lose your balance and fall. Talk to your doctor if you have numbness in your feet. You may get dizzy if you do not drink enough water. To prevent dehydration, drink plenty of fluids. Choose water and other clear liquids. If you have kidney, heart, or liver disease and have to limit fluids, talk with your doctor before you increase the amount of fluids you drink.   Preventing falls at home  Remove raised doorway thresholds, throw rugs, and clutter. Repair loose carpet or raised areas in the floor. Move furniture and electrical cords to keep them out of walking paths. Use nonskid floor wax, and wipe up spills right away, especially on ceramic tile floors. If you use a walker or cane, put rubber tips on it. If you use crutches, clean the bottoms of them regularly with an abrasive pad, such as steel wool. Keep your house well lit, especially stairways, porches, and outside walkways. Use night-lights in areas such as hallways and bathrooms. Add extra light switches or use remote switches (such as switches that go on or off when you clap your hands) to make it easier to turn lights on if you have to get up during the night. Install sturdy handrails on stairways. Move items in your cabinets so that the things you use a lot are on the lower shelves (about waist level). Keep a cordless phone and a flashlight with new batteries by your bed. If possible, put a phone in each of the main rooms of your house, or carry a cell phone in case you fall and cannot reach a phone. Or, you can wear a device around your neck or wrist. You push a button that sends a signal for help. Wear low-heeled shoes that fit well and give your feet good support. Use footwear with nonskid soles. Check the heels and soles of your shoes for wear. Repair or replace worn heels or soles. Do not wear socks without shoes on wood floors. Walk on the grass when the sidewalks are slippery. If you live in an area that gets snow and ice in the winter, sprinkle salt on slippery steps and sidewalks. Or ask a family member or friend to do this for you. Preventing falls in the bath  Install grab bars and nonskid mats inside and outside your shower or tub and near the toilet and sinks. Use shower chairs and bath benches. Use a hand-held shower head that will allow you to sit while showering.   Get into a tub or shower by putting the weaker leg in first. Get out of a tub or shower with your strong side first.  Repair loose toilet seats and consider installing a raised toilet seat to make getting on and off the toilet easier. Keep your bathroom door unlocked while you are in the shower. Where can you learn more? Go to https://chpepiceweb.[employer]. org and sign in to your [employer] account. Enter [phone] in the [facility] box to learn more about \"Preventing Falls: Care Instructions. \"     If you do not have an account, please click on the \"Sign Up Now\" link. Current as of: May 4, 2022               Content Version: 13.4  © 2006-2022 Healthwise, [employer]. Care instructions adapted under license by [facility] ([facility]). If you have questions about a medical condition or this instruction, always ask your healthcare professional. [place] [address] any warranty or liability for your use of this information. Hearing Loss: Care Instructions  Overview     Hearing loss is a sudden or slow decrease in how well you hear. It can range from mild to severe. Permanent hearing loss can occur with aging. It also can happen when you are exposed long-term to loud noise. Examples include listening to loud music, riding motorcycles, or being around other loud machines. Hearing loss can affect your work and home life. It can make you feel lonely or depressed. You may feel that you have lost your independence. But hearing aids and other devices can help you hear better and feel connected to others. Follow-up care is a key part of your treatment and safety. Be sure to make and go to all appointments, and call your doctor if you are having problems. It's also a good idea to know your test results and keep a list of the medicines you take. How can you care for yourself at home? Avoid loud noises whenever possible. This helps keep your hearing from getting worse. Always wear hearing protection around loud noises.   Wear a hearing aid as directed. See a professional who can help you pick a hearing aid that fits you. Have hearing tests as your doctor suggests. They can show whether your hearing has changed. Your hearing aid may need to be adjusted. Use other devices as needed. These may include:  Telephone amplifiers and hearing aids that can connect to a television, stereo, radio, or microphone. Devices that use lights or vibrations. These alert you to the doorbell, a ringing telephone, or a baby monitor. Television closed-captioning. This shows the words at the bottom of the screen. Most new TVs can do this. TTY (text telephone). This lets you type messages back and forth on the telephone instead of talking or listening. These devices are also called TDD. When messages are typed on the keyboard, they are sent over the phone line to a receiving TTY. The message is shown on a monitor. Use text messaging, social media, and email if it is hard for you to communicate by telephone. Try to learn a listening technique called speechreading. It is not lipreading. You pay attention to people's gestures, expressions, posture, and tone of voice. These clues can help you understand what a person is saying. Face the person you are talking to, and have them face you. Make sure the lighting is good. You need to see the other person's face clearly. Think about counseling if you need help to adjust to your hearing loss. When should you call for help? Watch closely for changes in your health, and be sure to contact your doctor if:    You think your hearing is getting worse.     You have new symptoms, such as dizziness or nausea. Where can you learn more? Go to https://kain.[employer]. org and sign in to your [employer] account. Enter S443 in the [employer] box to learn more about \"Hearing Loss: Care Instructions. \"     If you do not have an account, please click on the \"Sign Up Now\" link. Current as of:  May 4, 3449               GAOMLQH Version: 13.4  © 0083-3403 Healthwise, [employer]. Care instructions adapted under license by [facility] ([facility]). If you have questions about a medical condition or this instruction, always ask your healthcare professional. [address] any warranty or liability for your use of this information. Advance Directives: Care Instructions  Overview  An advance directive is a legal way to state your wishes at the end of your life. It tells your family and your doctor what to do if you can't say what you want. There are two main types of advance directives. You can change them any time your wishes change. Living will. This form tells your family and your doctor your wishes about life support and other treatment. The form is also called a declaration. Medical power of . This form lets you name a person to make treatment decisions for you when you can't speak for yourself. This person is called a health care agent (health care proxy, health care surrogate). The form is also called a durable power of  for health care. If you do not have an advance directive, decisions about your medical care may be made by a family member, or by a doctor or a  who doesn't know you. It may help to think of an advance directive as a gift to the people who care for you. If you have one, they won't have to make tough decisions by themselves. Follow-up care is a key part of your treatment and safety. Be sure to make and go to all appointments, and call your doctor if you are having problems. It's also a good idea to know your test results and keep a list of the medicines you take. What should you include in an advance directive? Many states have a unique advance directive form. (It may ask you to address specific issues.) Or you might use a universal form that's approved by many states.   If your form doesn't tell you what to address, it may be hard to know what to include in your advance directive. Use the questions below to help you get started. Who do you want to make decisions about your medical care if you are not able to? What life-support measures do you want if you have a serious illness that gets worse over time or can't be cured? What are you most afraid of that might happen? (Maybe you're afraid of having pain, losing your independence, or being kept alive by machines.)  Where would you prefer to die? (Your home? A hospital? A nursing home?)  Do you want to donate your organs when you die? Do you want certain Congregation practices performed before you die? When should you call for help? Be sure to contact your doctor if you have any questions. Where can you learn more? Go to https://The Sea App.LYYN. org and sign in to your [employer] account. Enter R264 in the [employer] box to learn more about \"Advance Directives: Care Instructions. \"     If you do not have an account, please click on the \"Sign Up Now\" link. Current as of: June 16, 2022               Content Version: 13.4  © 4547-9948 Healthwise, Incorporated. Care instructions adapted under license by [facility] ([facility]). If you have questions about a medical condition or this instruction, always ask your healthcare professional. [address] any warranty or liability for your use of this information. Personalized Preventive Plan for Warden Bartlett - 12/8/2022  Medicare offers a range of preventive health benefits. Some of the tests and screenings are paid in full while other may be subject to a deductible, co-insurance, and/or copay. Some of these benefits include a comprehensive review of your medical history including lifestyle, illnesses that may run in your family, and various assessments and screenings as appropriate.     After reviewing your medical record and screening and assessments performed today your provider may have ordered immunizations, labs, imaging, and/or referrals for you. A list of these orders (if applicable) as well as your Preventive Care list are included within your After Visit Summary for your review. Other Preventive Recommendations:    A preventive eye exam performed by an eye specialist is recommended every 1-2 years to screen for glaucoma; cataracts, macular degeneration, and other eye disorders. A preventive dental visit is recommended every 6 months. Try to get at least 150 minutes of exercise per week or 10,000 steps per day on a pedometer . Order or download the FREE \"Exercise & Physical Activity: Your Everyday Guide\" from [employer]. Call 9-868.238.3016 or search [employer] online. You need 5756-4737 mg of calcium and 4885-9658 IU of vitamin D per day. It is possible to meet your calcium requirement with diet alone, but a vitamin D supplement is usually necessary to meet this goal.  When exposed to the sun, use a sunscreen that protects against both UVA and UVB radiation with an SPF of 30 or greater. Reapply every 2 to 3 hours or after sweating, drying off with a towel, or swimming. Always wear a seat belt when traveling in a car. Always wear a helmet when riding a bicycle or motorcycle.

## 2023-01-07 PROBLEM — Z00.00 INITIAL MEDICARE ANNUAL WELLNESS VISIT: Status: RESOLVED | Noted: 2022-12-08 | Resolved: 2023-01-07

## 2023-03-13 DIAGNOSIS — Z01.818 PREOP EXAMINATION: Primary | ICD-10-CM

## 2023-03-13 DIAGNOSIS — Z01.818 PREOP EXAMINATION: ICD-10-CM

## 2023-03-13 LAB
ANION GAP SERPL CALCULATED.3IONS-SCNC: 10 MMOL/L (ref 3–16)
APTT: 31.8 SEC (ref 23–34.3)
BASOPHILS ABSOLUTE: 0.1 K/UL (ref 0–0.2)
BASOPHILS RELATIVE PERCENT: 1.3 %
BUN BLDV-MCNC: 21 MG/DL (ref 7–20)
CALCIUM SERPL-MCNC: 8.9 MG/DL (ref 8.3–10.6)
CHLORIDE BLD-SCNC: 107 MMOL/L (ref 99–110)
CO2: 24 MMOL/L (ref 21–32)
CREAT SERPL-MCNC: 1.3 MG/DL (ref 0.8–1.3)
EOSINOPHILS ABSOLUTE: 0.3 K/UL (ref 0–0.6)
EOSINOPHILS RELATIVE PERCENT: 5.7 %
GFR SERPL CREATININE-BSD FRML MDRD: >60 ML/MIN/{1.73_M2}
GLUCOSE BLD-MCNC: 116 MG/DL (ref 70–99)
HCT VFR BLD CALC: 42.3 % (ref 40.5–52.5)
HEMOGLOBIN: 14.1 G/DL (ref 13.5–17.5)
INR BLD: 0.92 (ref 0.87–1.14)
LYMPHOCYTES ABSOLUTE: 1.8 K/UL (ref 1–5.1)
LYMPHOCYTES RELATIVE PERCENT: 32 %
MCH RBC QN AUTO: 28.6 PG (ref 26–34)
MCHC RBC AUTO-ENTMCNC: 33.4 G/DL (ref 31–36)
MCV RBC AUTO: 85.7 FL (ref 80–100)
MONOCYTES ABSOLUTE: 0.4 K/UL (ref 0–1.3)
MONOCYTES RELATIVE PERCENT: 7.3 %
NEUTROPHILS ABSOLUTE: 3 K/UL (ref 1.7–7.7)
NEUTROPHILS RELATIVE PERCENT: 53.7 %
PDW BLD-RTO: 14.4 % (ref 12.4–15.4)
PLATELET # BLD: 352 K/UL (ref 135–450)
PMV BLD AUTO: 7.3 FL (ref 5–10.5)
POTASSIUM SERPL-SCNC: 4.7 MMOL/L (ref 3.5–5.1)
PROTHROMBIN TIME: 12.3 SEC (ref 11.7–14.5)
RBC # BLD: 4.94 M/UL (ref 4.2–5.9)
SODIUM BLD-SCNC: 141 MMOL/L (ref 136–145)
WBC # BLD: 5.5 K/UL (ref 4–11)

## 2023-03-13 PROCEDURE — 36415 COLL VENOUS BLD VENIPUNCTURE: CPT | Performed by: FAMILY MEDICINE

## 2023-03-16 ENCOUNTER — OFFICE VISIT (OUTPATIENT)
Dept: FAMILY MEDICINE CLINIC | Age: 68
End: 2023-03-16
Payer: MEDICARE

## 2023-03-16 VITALS
TEMPERATURE: 97.3 F | HEIGHT: 68 IN | RESPIRATION RATE: 16 BRPM | OXYGEN SATURATION: 98 % | SYSTOLIC BLOOD PRESSURE: 122 MMHG | DIASTOLIC BLOOD PRESSURE: 82 MMHG | HEART RATE: 78 BPM | BODY MASS INDEX: 28.55 KG/M2 | WEIGHT: 188.4 LBS

## 2023-03-16 DIAGNOSIS — Z01.818 PRE-OP EXAMINATION: Primary | ICD-10-CM

## 2023-03-16 DIAGNOSIS — Z01.818 PREOP EXAMINATION: ICD-10-CM

## 2023-03-16 DIAGNOSIS — M25.69 STIFFNESS OF OTHER SPECIFIED JOINT, NOT ELSEWHERE CLASSIFIED: ICD-10-CM

## 2023-03-16 DIAGNOSIS — Z01.818 PREOP EXAMINATION: Primary | ICD-10-CM

## 2023-03-16 LAB
ABO + RH BLD: NORMAL
BLD GP AB SCN SERPL QL: NORMAL

## 2023-03-16 PROCEDURE — 1123F ACP DISCUSS/DSCN MKR DOCD: CPT | Performed by: FAMILY MEDICINE

## 2023-03-16 PROCEDURE — G8484 FLU IMMUNIZE NO ADMIN: HCPCS | Performed by: FAMILY MEDICINE

## 2023-03-16 PROCEDURE — G8417 CALC BMI ABV UP PARAM F/U: HCPCS | Performed by: FAMILY MEDICINE

## 2023-03-16 PROCEDURE — 1036F TOBACCO NON-USER: CPT | Performed by: FAMILY MEDICINE

## 2023-03-16 PROCEDURE — 36415 COLL VENOUS BLD VENIPUNCTURE: CPT | Performed by: FAMILY MEDICINE

## 2023-03-16 PROCEDURE — 3017F COLORECTAL CA SCREEN DOC REV: CPT | Performed by: FAMILY MEDICINE

## 2023-03-16 PROCEDURE — 99214 OFFICE O/P EST MOD 30 MIN: CPT | Performed by: FAMILY MEDICINE

## 2023-03-16 PROCEDURE — G8427 DOCREV CUR MEDS BY ELIG CLIN: HCPCS | Performed by: FAMILY MEDICINE

## 2023-03-16 SDOH — ECONOMIC STABILITY: HOUSING INSECURITY
IN THE LAST 12 MONTHS, WAS THERE A TIME WHEN YOU DID NOT HAVE A STEADY PLACE TO SLEEP OR SLEPT IN A SHELTER (INCLUDING NOW)?: NO

## 2023-03-16 SDOH — ECONOMIC STABILITY: FOOD INSECURITY: WITHIN THE PAST 12 MONTHS, THE FOOD YOU BOUGHT JUST DIDN'T LAST AND YOU DIDN'T HAVE MONEY TO GET MORE.: NEVER TRUE

## 2023-03-16 SDOH — ECONOMIC STABILITY: FOOD INSECURITY: WITHIN THE PAST 12 MONTHS, YOU WORRIED THAT YOUR FOOD WOULD RUN OUT BEFORE YOU GOT MONEY TO BUY MORE.: NEVER TRUE

## 2023-03-16 SDOH — ECONOMIC STABILITY: INCOME INSECURITY: HOW HARD IS IT FOR YOU TO PAY FOR THE VERY BASICS LIKE FOOD, HOUSING, MEDICAL CARE, AND HEATING?: NOT HARD AT ALL

## 2023-03-16 ASSESSMENT — PATIENT HEALTH QUESTIONNAIRE - PHQ9
1. LITTLE INTEREST OR PLEASURE IN DOING THINGS: 0
SUM OF ALL RESPONSES TO PHQ QUESTIONS 1-9: 0
SUM OF ALL RESPONSES TO PHQ9 QUESTIONS 1 & 2: 0
2. FEELING DOWN, DEPRESSED OR HOPELESS: 0
SUM OF ALL RESPONSES TO PHQ QUESTIONS 1-9: 0

## 2023-03-16 NOTE — PROGRESS NOTES
Preoperative Consultation      Clovis Lemus  YOB: 1955    Date of Service:  3/16/2023    Vitals:    03/16/23 0913   BP: 122/82   Pulse: 78   Resp: 16   Temp: 97.3 °F (36.3 °C)   TempSrc: Tympanic   SpO2: 98%   Weight: 188 lb 6.4 oz (85.5 kg)   Height: 5' 7.5\" (1.715 m)      Wt Readings from Last 2 Encounters:   03/16/23 188 lb 6.4 oz (85.5 kg)   12/08/22 188 lb 9.6 oz (85.5 kg)     BP Readings from Last 3 Encounters:   03/16/23 122/82   12/08/22 138/86   07/20/22 136/84        Chief Complaint   Patient presents with    Pre-op Exam     Back surgery with Dr. Scott Casanova on 3/20/23 at [address] [place] [address]       No Known Allergies  No outpatient medications have been marked as taking for the 3/16/23 encounter (Office Visit) with Kay Elliott MD.       This patient presents to the office today for a preoperative consultation at the request of surgeon, Dr. Rom Mullen , who plans on performing Lumbar Laminectomy on March 23 at [address] [place] [address] Planned anesthesia: General   Known anesthesia problems: None   Bleeding risk: No recent or remote history of abnormal bleeding  Personal or FH of DVT/PE: No    Patient objection to receiving blood products: No        Past Medical History:   Diagnosis Date    BPH (benign prostatic hyperplasia)      Past Surgical History:   Procedure Laterality Date    CERVICAL FUSION  2006    CERVICAL FUSION N/A 10/15/2021    ANTERIOR CERVICAL DISCECTOMY AND FUSION C3-4 AND C6-7, DECOMPRESSION AND FUSION C3-4 POSTERIOR/ REMOVAL OF PLATES AND LATERAL MASS SCREW performed by Vianney Harper MD at [address], LAPAROSCOPIC  2009    LEG SURGERY  2000    benign tumor; partial gastronemeus resection     No family history on file.   Social History     Socioeconomic History    Marital status:      Spouse name: Not on file    Number of children: 3    Years of education: Not on file    Highest education level: Not on file   Occupational History    Occupation:  Tobacco Use    Smoking status: Never    Smokeless tobacco: Never   Substance and Sexual Activity    Alcohol use: No    Drug use: No    Sexual activity: Yes     Partners: Female       Review of Systems  A comprehensive review of systems was negative except for what was noted in the HPI. Physical Exam   Blood pressure 122/82, pulse 78, temperature 97.3 °F (36.3 °C), temperature source Tympanic, resp. rate 16, height 5' 7.5\" (1.715 m), weight 188 lb 6.4 oz (85.5 kg), SpO2 98 %. Constitutional: He is oriented to person, place, and time. He appears well-developed and well-nourished. No distress. HENT:   Head: Normocephalic and atraumatic. Mouth/Throat: Uvula is midline, oropharynx is clear and moist and mucous membranes are normal.   Eyes: Conjunctivae and EOM are normal. Pupils are equal, round, and reactive to light. Neck: Trachea normal and normal range of motion. Neck supple. No JVD present. Carotid bruit is not present. No mass and no thyromegaly present. Cardiovascular: Normal rate, regular rhythm, normal heart sounds and intact distal pulses. Exam reveals no gallop and no friction rub. No murmur heard. Pulmonary/Chest: Effort normal and breath sounds normal. No respiratory distress. He has no wheezes. He has no rales. Abdominal: Soft. Normal aorta and bowel sounds are normal. He exhibits no distension and no mass. There is no hepatosplenomegaly. No tenderness. Musculoskeletal: He exhibits no edema and no tenderness. Neurological: He is alert and oriented to person, place, and time. He has normal strength. No cranial nerve deficit or sensory deficit. Coordination and gait normal.   Skin: Skin is warm and dry. No rash noted. No erythema. Psychiatric: He has a normal mood and affect. His behavior is normal.     EKG Interpretation:  normal EKG, normal sinus rhythm.     Lab Review not applicable    Latest Reference Range & Units 3/13/23 08:17   Sodium 136 - 145 mmol/L 141   Potassium 3.5 - 5.1 mmol/L 4.7   Chloride 99 - 110 mmol/L 107   CO2 21 - 32 mmol/L 24   BUN,BUNPL 7 - 20 mg/dL 21 (H)   Creatinine 0.8 - 1.3 mg/dL 1.3   Anion Gap 3 - 16  10   Est, Glom Filt Rate >60  >60   Glucose, Random 70 - 99 mg/dL 116 (H)  Non fasting    CALCIUM, SERUM, 803471 8.3 - 10.6 mg/dL 8.9   (H): Data is abnormally high   Latest Reference Range & Units 3/13/23 08:17   WBC 4.0 - 11.0 K/uL 5.5   RBC 4.20 - 5.90 M/uL 4.94   Hemoglobin Quant 13.5 - 17.5 g/dL 14.1   Hematocrit 40.5 - 52.5 % 42.3   MCV 80.0 - 100.0 fL 85.7   MCH 26.0 - 34.0 pg 28.6   MCHC 31.0 - 36.0 g/dL 33.4   MPV 5.0 - 10.5 fL 7.3   RDW 12.4 - 15.4 % 14.4   Platelet Count 658 - 450 K/uL 352   Neutrophils % % 53.7   Lymphocyte % % 32.0   Monocytes % % 7.3   Eosinophils % % 5.7   Basophils % % 1.3   Neutrophils Absolute 1.7 - 7.7 K/uL 3.0   Lymphocytes Absolute 1.0 - 5.1 K/uL 1.8   Monocytes Absolute 0.0 - 1.3 K/uL 0.4   Eosinophils Absolute 0.0 - 0.6 K/uL 0.3      Latest Reference Range & Units 3/13/23 08:17   Prothrombin Time 11.7 - 14.5 sec 12.3   INR 0.87 - 1.14  0.92   aPTT 23.0 - 34.3 sec 31.8     EKG and blood work interpreted by me for today's apt     Assessment:       79 y.o. patient with planned surgery as above. Known risk factors for perioperative complications: history of cervical spine fusion   Current medications which may produce withdrawal symptoms if withheld perioperatively: none      Plan:     1. Preoperative workup as follows: hemoglobin, hematocrit, electrolytes, creatinine, glucose, coagulation studies  2. Change in medication regimen before surgery: Discontinue NSAIDs (ibu/aleve) 7 days before surgery  3.  Prophylaxis for cardiac events with perioperative beta-blockers: Not indicated  ACC/AHA indications for pre-operative beta-blocker use:    Vascular surgery with history of postitive stress test  Intermediate or high risk surgery with history of CAD   Intermediate or high risk surgery with multiple clinical predictors of CAD- 2 of the following: history of compensated or prior heart failure, history of cerebrovascular disease, DM, or renal insufficiency    Routine administration of higher-dose, long-acting metoprolol in beta-blocker-naïve patients on the day of surgery, and in the absence of dose titration is associated with an overall increase in mortality. Beta-blockers should be started days to weeks prior to surgery and titrated to pulse < 70.  4. Deep vein thrombosis prophylaxis: regimen to be chosen by surgical team  5.  No contraindications to planned surgery

## 2024-01-11 ENCOUNTER — COMMUNITY OUTREACH (OUTPATIENT)
Dept: FAMILY MEDICINE CLINIC | Age: 69
End: 2024-01-11

## 2024-09-30 ENCOUNTER — TELEPHONE (OUTPATIENT)
Dept: OTHER | Facility: CLINIC | Age: 69
End: 2024-09-30

## 2024-09-30 NOTE — TELEPHONE ENCOUNTER
Patient was outreached to as part of Population Health scheduling activity. Patient may be due for a wellness visit with their primary care provider.    Outcome of call: PATIENT UNAVAILABLE - LEFT VOICEMAIL

## (undated) DEVICE — 3M™ IOBAN™ 2 ANTIMICROBIAL INCISE DRAPE 6640EZ: Brand: IOBAN™ 2

## (undated) DEVICE — SOLUTION IV 1000ML 0.9% SOD CHL

## (undated) DEVICE — SUTURE VCRL SZ 0 L18IN ABSRB UD L36MM CT-1 1/2 CIR J840D

## (undated) DEVICE — STAPLER SKIN H3.9MM WIRE DIA0.58MM CRWN 6.9MM 35 STPL FIX

## (undated) DEVICE — NEURO SPONGES: Brand: DEROYAL

## (undated) DEVICE — BIT DRL LNG 2.4MM F/3.5MM SCREW

## (undated) DEVICE — 3M™ DURAPORE™ SURGICAL TAPE 1538-3, 3 INCH X 10 YARD (7,5CM X 9,1M), 4 ROLLS/BOX: Brand: 3M™ DURAPORE™

## (undated) DEVICE — AGENT HEMOSTATIC SURGIFLOW MATRIX KIT W/THROMBIN

## (undated) DEVICE — ADHESIVE SKIN CLSR 0.7ML TOP DERMBND ADV

## (undated) DEVICE — GOWN,SIRUS,POLYRNF,BRTHSLV,XLN/XXL,18/CS: Brand: MEDLINE

## (undated) DEVICE — SUTURE VCRL SZ 3-0 L18IN ABSRB UD L26MM SH 1/2 CIR J864D

## (undated) DEVICE — UNDERGLOVE SURG SZ 8 BLU LTX FREE SYN POLYISOPRENE POLYMER

## (undated) DEVICE — GLOVE SURG SZ 8 L12IN FNGR THK87MIL WHT LTX FREE

## (undated) DEVICE — APPLICATOR PREP 26ML 0.7% IOD POVACRYLEX 74% ISO ALC ST

## (undated) DEVICE — SYMMETRY SHARP KERRISON® RONGEUR TIPS, THIN FOOTPLATE, 3 MM TIP, SINGLE USE, (3/BX): Brand: SYMMETRY SHARP KERRISON

## (undated) DEVICE — SYMMETRY SHARP KERRISON® RONGEUR TIPS, THIN FOOTPLATE, 1 MM TIP, SINGLE USE, (3/BX): Brand: SYMMETRY SHARP KERRISON®

## (undated) DEVICE — SUTURE MCRYL SZ 4-0 L27IN ABSRB UD L19MM PS-2 1/2 CIR PRIM Y426H

## (undated) DEVICE — 3M(TM) TRANSPORE SURGICAL TAPE 1527-1: Brand: 3M™ TRANSPORE™

## (undated) DEVICE — TOOL 14MH30 LEGEND 14CM 3MM: Brand: MIDAS REX ™

## (undated) DEVICE — GLOVE SURG SZ 85 L12IN FNGR THK94MIL TRNSLUC YEL LTX

## (undated) DEVICE — SUTURE ETHLN SZ 3-0 L30IN NONABSORBABLE BLK L36MM FSLX 3/8 1673BH

## (undated) DEVICE — LAMINECTOMY: Brand: MEDLINE INDUSTRIES, INC.

## (undated) DEVICE — PIN ADLT MAYFIELD RIGID MOLD FINGER

## (undated) DEVICE — TOTAL TRAY, 16FR 10ML SIL FOLEY, URN: Brand: MEDLINE

## (undated) DEVICE — ELECTRODE NERVE STIM FOR SPNL CRD MONITORING

## (undated) DEVICE — C-ARMOR C-ARM EQUIPMENT COVERS CLEAR STERILE UNIVERSAL FIT 12 PER CASE: Brand: C-ARMOR

## (undated) DEVICE — TOWEL,STOP FLAG GOLD N-W: Brand: MEDLINE

## (undated) DEVICE — BLANKET WRM W40.2XL55.9IN IORT LO BODY + MISTRAL AIR

## (undated) DEVICE — RONGEUR SURG KERRISON 2 MM SHRP THN FTPLT DISP

## (undated) DEVICE — SPONGE,PEANUT,XRAY,ST,SM,3/8",5/CARD: Brand: MEDLINE INDUSTRIES, INC.

## (undated) DEVICE — COVER LT HNDL CAM BLU DISP W/ SURG CTRL

## (undated) DEVICE — DRAPE MICSCP W54XL150IN W/ 4 BINOC GLS LENS LEICA

## (undated) DEVICE — PROBE 8225101 5PK STD PRASS FL TIP ROHS